# Patient Record
Sex: FEMALE | Race: BLACK OR AFRICAN AMERICAN | NOT HISPANIC OR LATINO | Employment: STUDENT | URBAN - METROPOLITAN AREA
[De-identification: names, ages, dates, MRNs, and addresses within clinical notes are randomized per-mention and may not be internally consistent; named-entity substitution may affect disease eponyms.]

---

## 2024-02-09 ENCOUNTER — OFFICE VISIT (OUTPATIENT)
Dept: PRIMARY CARE | Facility: CLINIC | Age: 24
End: 2024-02-09
Payer: MEDICAID

## 2024-02-09 ENCOUNTER — LAB (OUTPATIENT)
Dept: LAB | Facility: LAB | Age: 24
End: 2024-02-09
Payer: MEDICAID

## 2024-02-09 VITALS
SYSTOLIC BLOOD PRESSURE: 110 MMHG | BODY MASS INDEX: 22.36 KG/M2 | DIASTOLIC BLOOD PRESSURE: 70 MMHG | HEIGHT: 64 IN | WEIGHT: 131 LBS

## 2024-02-09 DIAGNOSIS — Z00.00 ANNUAL PHYSICAL EXAM: ICD-10-CM

## 2024-02-09 DIAGNOSIS — D50.0 IRON DEFICIENCY ANEMIA DUE TO CHRONIC BLOOD LOSS: ICD-10-CM

## 2024-02-09 DIAGNOSIS — Z00.00 ANNUAL PHYSICAL EXAM: Primary | ICD-10-CM

## 2024-02-09 DIAGNOSIS — K51.919 ULCERATIVE COLITIS WITH COMPLICATION, UNSPECIFIED LOCATION (MULTI): ICD-10-CM

## 2024-02-09 DIAGNOSIS — R77.0 LOW SERUM ALBUMIN: ICD-10-CM

## 2024-02-09 DIAGNOSIS — Z84.81 FAMILY HISTORY OF BRCA GENE MUTATION: ICD-10-CM

## 2024-02-09 DIAGNOSIS — D75.838 REACTIVE THROMBOCYTOSIS: ICD-10-CM

## 2024-02-09 DIAGNOSIS — Z11.1 VISIT FOR TB SKIN TEST: ICD-10-CM

## 2024-02-09 LAB
25(OH)D3 SERPL-MCNC: 32 NG/ML (ref 30–100)
ALBUMIN SERPL BCP-MCNC: 2.5 G/DL (ref 3.4–5)
ALP SERPL-CCNC: 216 U/L (ref 33–110)
ALT SERPL W P-5'-P-CCNC: 9 U/L (ref 7–45)
ANION GAP SERPL CALC-SCNC: 14 MMOL/L (ref 10–20)
AST SERPL W P-5'-P-CCNC: 12 U/L (ref 9–39)
BILIRUB SERPL-MCNC: 0.2 MG/DL (ref 0–1.2)
BUN SERPL-MCNC: 13 MG/DL (ref 6–23)
CALCIUM SERPL-MCNC: 8.8 MG/DL (ref 8.6–10.6)
CHLORIDE SERPL-SCNC: 108 MMOL/L (ref 98–107)
CHOLEST SERPL-MCNC: 81 MG/DL (ref 0–199)
CHOLESTEROL/HDL RATIO: 1.3
CO2 SERPL-SCNC: 25 MMOL/L (ref 21–32)
CREAT SERPL-MCNC: 0.53 MG/DL (ref 0.5–1.05)
EGFRCR SERPLBLD CKD-EPI 2021: >90 ML/MIN/1.73M*2
ERYTHROCYTE [DISTWIDTH] IN BLOOD BY AUTOMATED COUNT: 20.1 % (ref 11.5–14.5)
EST. AVERAGE GLUCOSE BLD GHB EST-MCNC: 94 MG/DL
FERRITIN SERPL-MCNC: 11 NG/ML (ref 8–150)
GLUCOSE SERPL-MCNC: 98 MG/DL (ref 74–99)
HBA1C MFR BLD: 4.9 %
HBV SURFACE AB SER-ACNC: <3.1 MIU/ML
HCT VFR BLD AUTO: 24.3 % (ref 36–46)
HCV AB SER QL: NONREACTIVE
HDLC SERPL-MCNC: 62 MG/DL
HGB BLD-MCNC: 6.4 G/DL (ref 12–16)
HIV 1+2 AB+HIV1 P24 AG SERPL QL IA: NONREACTIVE
LDLC SERPL CALC-MCNC: 9 MG/DL
MCH RBC QN AUTO: 17.7 PG (ref 26–34)
MCHC RBC AUTO-ENTMCNC: 26.3 G/DL (ref 32–36)
MCV RBC AUTO: 67 FL (ref 80–100)
NON HDL CHOLESTEROL: 19 MG/DL (ref 0–149)
NRBC BLD-RTO: 0 /100 WBCS (ref 0–0)
PLATELET # BLD AUTO: 692 X10*3/UL (ref 150–450)
POTASSIUM SERPL-SCNC: 3.9 MMOL/L (ref 3.5–5.3)
PROT SERPL-MCNC: 6.6 G/DL (ref 6.4–8.2)
RBC # BLD AUTO: 3.62 X10*6/UL (ref 4–5.2)
SODIUM SERPL-SCNC: 143 MMOL/L (ref 136–145)
TRIGL SERPL-MCNC: 50 MG/DL (ref 0–149)
TSH SERPL-ACNC: 2 MIU/L (ref 0.44–3.98)
VIT B12 SERPL-MCNC: 1136 PG/ML (ref 211–911)
VLDL: 10 MG/DL (ref 0–40)
WBC # BLD AUTO: 5.5 X10*3/UL (ref 4.4–11.3)

## 2024-02-09 PROCEDURE — 87389 HIV-1 AG W/HIV-1&-2 AB AG IA: CPT

## 2024-02-09 PROCEDURE — 80053 COMPREHEN METABOLIC PANEL: CPT

## 2024-02-09 PROCEDURE — 99395 PREV VISIT EST AGE 18-39: CPT | Performed by: INTERNAL MEDICINE

## 2024-02-09 PROCEDURE — 86803 HEPATITIS C AB TEST: CPT

## 2024-02-09 PROCEDURE — 82306 VITAMIN D 25 HYDROXY: CPT

## 2024-02-09 PROCEDURE — 99214 OFFICE O/P EST MOD 30 MIN: CPT | Performed by: INTERNAL MEDICINE

## 2024-02-09 PROCEDURE — 86481 TB AG RESPONSE T-CELL SUSP: CPT

## 2024-02-09 PROCEDURE — 86706 HEP B SURFACE ANTIBODY: CPT

## 2024-02-09 PROCEDURE — 82728 ASSAY OF FERRITIN: CPT

## 2024-02-09 PROCEDURE — 83036 HEMOGLOBIN GLYCOSYLATED A1C: CPT

## 2024-02-09 PROCEDURE — 36415 COLL VENOUS BLD VENIPUNCTURE: CPT

## 2024-02-09 PROCEDURE — 80061 LIPID PANEL: CPT

## 2024-02-09 PROCEDURE — 85027 COMPLETE CBC AUTOMATED: CPT

## 2024-02-09 PROCEDURE — 84443 ASSAY THYROID STIM HORMONE: CPT

## 2024-02-09 PROCEDURE — 82607 VITAMIN B-12: CPT

## 2024-02-09 RX ORDER — FERROUS SULFATE 300 MG/5ML
60 LIQUID (ML) ORAL DAILY
Qty: 150 ML | Refills: 11 | Status: SHIPPED | OUTPATIENT
Start: 2024-02-09 | End: 2025-02-08

## 2024-02-09 ASSESSMENT — PATIENT HEALTH QUESTIONNAIRE - PHQ9
2. FEELING DOWN, DEPRESSED OR HOPELESS: NOT AT ALL
1. LITTLE INTEREST OR PLEASURE IN DOING THINGS: NOT AT ALL
SUM OF ALL RESPONSES TO PHQ9 QUESTIONS 1 AND 2: 0

## 2024-02-09 ASSESSMENT — LIFESTYLE VARIABLES: HOW MANY STANDARD DRINKS CONTAINING ALCOHOL DO YOU HAVE ON A TYPICAL DAY: PATIENT DOES NOT DRINK

## 2024-02-09 NOTE — PROGRESS NOTES
Chief Complaint:   Chief Complaint   Patient presents with    CPE      HPI    Pastora Rawls is a 23 y.o. year old female who presents to the clinic for physical and several problems  Went to ER in October for leg swelling, was told she had low albumin and low hemoglobin.  She was also told that she has thrombocytosis.        Past Medical History   Past Medical History:   Diagnosis Date    Personal history of other diseases of the respiratory system     Personal history of asthma      Past Surgical History:   Past Surgical History:   Procedure Laterality Date    BACK SURGERY  09/17/2014    Back Surgery    TONSILLECTOMY  06/11/2014    Tonsillectomy With Adenoidectomy     Family History:   No family history on file.  Social History:   Tobacco Use: Low Risk  (2/9/2024)    Patient History     Smoking Tobacco Use: Never     Smokeless Tobacco Use: Never     Passive Exposure: Not on file      Social History     Substance and Sexual Activity   Alcohol Use Never        Allergies:   Not on File     ROS   Review of Systems     Objective   Vitals:    02/09/24 1420   BP: 110/70      BMI Readings from Last 15 Encounters:   02/09/24 22.49 kg/m²   02/09/23 24.65 kg/m²      59.4 kg (131 lb) (2/9/2024  2:20 PM)      Physical Exam  Physical Exam  Vitals and nursing note reviewed.   Constitutional:       Appearance: Normal appearance.   Cardiovascular:      Rate and Rhythm: Normal rate and regular rhythm.      Pulses: Normal pulses.      Heart sounds: Normal heart sounds.   Pulmonary:      Breath sounds: Normal breath sounds.   Abdominal:      General: Bowel sounds are normal.      Palpations: Abdomen is soft.   Musculoskeletal:         General: Normal range of motion.   Skin:     General: Skin is warm.   Neurological:      General: No focal deficit present.      Mental Status: She is alert.   Psychiatric:         Mood and Affect: Mood normal.         Behavior: Behavior normal.          Labs:  Lab Results   Component Value  "Date    WBC 6.4 02/09/2023    HGB 10.6 (L) 02/09/2023    HCT 35.9 (L) 02/09/2023    MCV 81 02/09/2023     (H) 02/09/2023     Lab Results   Component Value Date    GLUCOSE 92 02/09/2023    CALCIUM 9.0 02/09/2023     02/09/2023    K 4.1 02/09/2023    CO2 23 02/09/2023     (H) 02/09/2023    BUN 7 02/09/2023    CREATININE 0.62 02/09/2023         No components found for: \"URINE ALBUMIN\"  Lab Results   Component Value Date    HGBA1C 5.0 02/09/2023      Lab Results   Component Value Date    HGBA1C 5.0 02/09/2023     Lab Results   Component Value Date    TSH 2.04 02/09/2023       Current Medications:  No current outpatient medications on file.     No current facility-administered medications for this visit.       Assessment and Plan  Pastora was seen today for cpe.  Diagnoses and all orders for this visit:  Annual physical exam (Primary)  -     CBC; Future  -     Comprehensive Metabolic Panel; Future  -     TSH with reflex to Free T4 if abnormal; Future  -     Hemoglobin A1C; Future  -     Lipid Panel; Future  -     Vitamin D 25-Hydroxy,Total (for eval of Vitamin D levels); Future  -     Vitamin B12; Future  -     HIV 1/2 Antigen/Antibody Screen with Reflex to Confirmation; Future  -     T-Spot TB; Future  -     Hepatitis C antibody; Future  -     Hepatitis B surface antibody; Future  Low serum albumin  -     Comprehensive Metabolic Panel; Future  Ulcerative colitis with complication, unspecified location (CMS/HCC)  -     XR DEXA bone density; Future  Visit for TB skin test  Family history of BRCA gene mutation  -     Referral to Breast Surgery; Future  Iron deficiency anemia due to chronic blood loss  -     Ferritin; Future  Reactive thrombocytosis     Normal exam  Slight face puffiness seen    Anemia  Severe   Will likely need iron infusions  Meanwhile start iron liquid, unable to tolerate pills in the past    Thrombocytosis  Reactive  Due to severe iron deficiency    Low albumin  Due to incorrect vegan " diet  Recommend nutritionist referral to calculate protein needs and recommend supplements    BRCA mutation in the family  Recommend breast surgery referral      Immunizations:    There is no immunization history on file for this patient.  Please follow up in

## 2024-02-10 DIAGNOSIS — D50.0 IRON DEFICIENCY ANEMIA DUE TO CHRONIC BLOOD LOSS: Primary | ICD-10-CM

## 2024-02-10 RX ORDER — IRON SUCROSE 20 MG/ML
200 INJECTION, SOLUTION INTRAVENOUS
Qty: 20 ML | Refills: 0 | Status: SHIPPED
Start: 2024-02-10 | End: 2024-05-07 | Stop reason: ALTCHOICE

## 2024-02-10 RX ORDER — FAMOTIDINE 10 MG/ML
20 INJECTION INTRAVENOUS ONCE AS NEEDED
OUTPATIENT
Start: 2024-02-14

## 2024-02-10 RX ORDER — EPINEPHRINE 0.3 MG/.3ML
0.3 INJECTION SUBCUTANEOUS EVERY 5 MIN PRN
OUTPATIENT
Start: 2024-02-14

## 2024-02-10 RX ORDER — ALBUTEROL SULFATE 0.83 MG/ML
3 SOLUTION RESPIRATORY (INHALATION) AS NEEDED
OUTPATIENT
Start: 2024-02-14

## 2024-02-10 RX ORDER — DIPHENHYDRAMINE HYDROCHLORIDE 50 MG/ML
50 INJECTION INTRAMUSCULAR; INTRAVENOUS AS NEEDED
OUTPATIENT
Start: 2024-02-14

## 2024-02-11 LAB
NIL(NEG) CONTROL SPOT COUNT: NORMAL
PANEL A SPOT COUNT: 0
PANEL B SPOT COUNT: 0
POS CONTROL SPOT COUNT: NORMAL
T-SPOT. TB INTERPRETATION: NEGATIVE

## 2024-02-19 NOTE — PROGRESS NOTES
Gastroenterology Clinic New Consult Note    Reason For Consult: UC    History Of Present Illness  22F with juvenile idiopathic scoliosis and UC referred to GI clinic for management of Ulcerative colitis.     In terms of hx of UC, pt had previously followed with pediatric GI. Pastora was diagnosed with UC age 7 (presenting with GI bleeding, diarrhea). Peds GI notes from 2014 mention azathioprine 2.5 tabs and Asacol/mesalamine 2 capsules as her regimen. Asacol was increased from bid to tid in 2015 for elevated CRP. Lost asacol coverage sometime in 2015 and was transitioned to Sulfazalazine 500mg 2 tablets twice at day, Folic acid 1 tablet daily, Imuran 2 and half tablets Monday wed, Friday, 3 tablets tues, Thursday, sat and Sunday in 2016. Was also intermittently on prednisone (when she was first diagnosed, and then about < 1 time per year), was last on it in middle school. Went vegan at 15, diet managing. Her last scope was in 2016, mild pancolitis with biopsies showed nonactive colitis. Her last Imuran level (6 MP level) was 339 in August of 2016. FCP 2016 1289 --> 568. 6-TG 2016 203. She was last seen by peds GI 3/2017. Pt was then seen by Dr. Mayers to establish care with adult GI in Aug, 2022. At the time, she had been off all therapy. She was recommended for colonoscopy, blood work and FCP. Pt was then seen for follow up in Feb, 2023. She did not get any of the testing completed. Had been doing well (Bms: 2-5x/day, loose, does endorse it takes a while to evacuate, no hematochezia).  She was re-ordered for colonoscopy, blood work and stool studies with plans to start on mesalamine. Labs from Feb, 2023: Hb 10.6 (MCV 81), plts 589. Cr 0.62. Albumin 3.3. Alkphos 143.     Pt presented to ED in October for LE edema. Albumin at the time 2.3. Alkphos 242. AST ALT 15/7. Cr 0.46. Hb 7.1 (MCV 69.3). Plts 672.     Pt saw her PCP, Dr. Lockhart, on 2/9. Labs with Hb down to 6.4 (MCV 67), plts 692. Albumin 2.5. alkphos 216.  LFTs otherwise wnls. Ferritin 11. Pt was ordered for IV iron infusion, recommended to start liquid iron BID.     She is currently vegan; avoids spicy foods. Wants to see a nutritionist. Over the past 2 years her symptoms have gotten out of control, can't manage her symptoms. Wasn't happy with care with prior GI. Flares: urgency, frequent stools, some bleeding per rectum, fatigue. Recently: having 3-4 Bms per day, but can get up to 6-8 times per day, loose. Very diet dependent. Having blood in stool - off and on, within the past 2 weeks its about every other day. No melena. Does have abd pain when she is flaring, pain throughout. No tenesmus, slight pain with BM. Does have joint pain- specifically her fingers, no swelling of joints. No rashes. Has had weight loss (2021 lost about 50 lbs, but more stable recently). NO fevers.  Hasn't started liquid iron that PCP prescribed. No recent hospital admissions. No heavy period- regular every 28 days, 3-4 days, very light. Does not take NSAIDs. Lately has been feeling SOB, has tachycardia often.     She would be interested in medications at this time. Doesn't want to take a ton of pills. Hesitant to start steroids.     FCT, Hep B S Ag  Colonoscopy   Uceris   Entyvio      Past Medical History  Past Medical History:   Diagnosis Date    Personal history of other diseases of the respiratory system     Personal history of asthma       Surgical History  Past Surgical History:   Procedure Laterality Date    BACK SURGERY  09/17/2014    Back Surgery    TONSILLECTOMY  06/11/2014    Tonsillectomy With Adenoidectomy       Social History  No smoking. No EtOH use. No drug use. Works as gan/ author.     Family History  No family history on file.   No fhx of CRC, esophageal, gastric, or pancreatic cancer. Recently found biologic father, he additionally has GI issues.    BRCA gene in family (maternal grandmother), Pastora has never been tested but has appointment coming up.   Mom- breast  "cancer when she was 35  Great aunt (m)- breast cancer   Great grandmother (m)- breast cancer     Allergies  Allergies   Allergen Reactions    Aspirin Other    Gelatin Itching     Nausea/vomitting    Nsaids (Non-Steroidal Anti-Inflammatory Drug) Itching     Crohn's       Home Medications- not currently taking     Current Outpatient Medications:     ferrous sulfate 300 mg (60 mg iron)/5 mL syrup, Take 5 mL (60 mg of iron) by mouth once daily., Disp: 150 mL, Rfl: 11    PRENAT 115-IRON FUM-FOLIC-DSS ORAL, Take by mouth., Disp: , Rfl:     iron sucrose (Venofer) 200 mg iron/10 mL injection, Infuse 10 mL (200 mg) over 5 minutes into a venous catheter 1 (one) time per week. (Patient not taking: Reported on 2/20/2024), Disp: 20 mL, Rfl: 0    Physical Exam     Last Recorded Vitals  Blood pressure 103/66, pulse 101, temperature 36.4 °C (97.6 °F), resp. rate 18, height 1.626 m (5' 4\"), weight 60.8 kg (134 lb), SpO2 100 %.    General: well-nourished, no acute distress  HEENT: PERRLA, EOM intact, no scleral icterus, moist MM  Respiratory: CTA bilaterally, normal work of breathing  Cardiovascular: RRR, no murmurs/rubs/gallops  Abdomen: Soft, mild LLQ tenderness to deep palpation, otherwise no abd pain; non-distended, bowel sounds present  Extremities: no edema, no asterixis, no joint effusions, no rash  Neuro: alert and oriented, no focal deficits     Relevant Results    Current Outpatient Medications:     ferrous sulfate 300 mg (60 mg iron)/5 mL syrup, Take 5 mL (60 mg of iron) by mouth once daily., Disp: 150 mL, Rfl: 11    PRENAT 115-IRON FUM-FOLIC-DSS ORAL, Take by mouth., Disp: , Rfl:     iron sucrose (Venofer) 200 mg iron/10 mL injection, Infuse 10 mL (200 mg) over 5 minutes into a venous catheter 1 (one) time per week. (Patient not taking: Reported on 2/20/2024), Disp: 20 mL, Rfl: 0     Lab Results   Component Value Date    WBC 5.5 02/09/2024    HGB 6.4 (LL) 02/09/2024    HCT 24.3 (L) 02/09/2024    MCV 67 (L) 02/09/2024    "  (H) 02/09/2024     Lab Results   Component Value Date    GLUCOSE 98 02/09/2024    CALCIUM 8.8 02/09/2024     02/09/2024    K 3.9 02/09/2024    CO2 25 02/09/2024     (H) 02/09/2024    BUN 13 02/09/2024    CREATININE 0.53 02/09/2024     Lab Results   Component Value Date    ALT 9 02/09/2024    AST 12 02/09/2024    ALKPHOS 216 (H) 02/09/2024    BILITOT 0.2 02/09/2024       Imaging:  No Abd imaging in our system.     Procedures:  EGD 2016: - Normal esophagus, stomach, and examined duodenum. Biopsied.     Colonoscopy 2016:  - Inflammation was found in the colon, in the recto-sigmoid colon, in the transverse colon and at the cecum secondary to colitis. Biopsied.   A. ESOPHAGUS, BIOPSY: --NO SIGNIFICANT HISTOPATHOLOGIC CHANGE. --NEGATIVE FOR INTRAEPITHELIAL EOSINOPHILS.   B. STOMACH, BIOPSY: --NO SIGNIFICANT HISTOPATHOLOGIC CHANGE. --NEGATIVE FOR HELICOBACTER PYLORI-LIKE ORGANISMS BY MORPHOLOGY.   C. DUODENUM, BIOPSY: --NORMAL VILLOUS ARCHITECTURE WITH NO SIGNIFICANT HISTOPATHOLOGIC CHANGE.   D. TERMINAL ILEUM, BIOPSY: --PROMINENT LYMPHOID AGGREGATE, OTHERWISE NO SIGNIFICANT HISTOPATHOLOGIC CHANGE.   E. CECUM, BIOPSY: --MILD CHRONIC COLITIS WITHOUT ACTIVITY.   F. COLON, RIGHT, BIOPSY: --MILD CHRONIC COLITIS WITHOUT ACTIVITY.   G. COLON, LEFT, BIOPSY: --MILD CHRONIC COLITIS WITHOUT ACTIVITY.   H. RECTUM, BIOPSY: --MILD CHRONIC PROCTITIS WITHOUT ACTIVITY. Comment: There is no evidence of active colitis and only mild chronicity.     EGD 2013:   Impression:            - Normal esophagus. This was biopsied.                         - Gastritis. This was biopsied.                         - Normal examined duodenum. This was biopsied.    Colon 2013:  Impression:            - Inflammation was found from the rectum to the                          hepatic flexure secondary to ulcerative colitis. This                          was biopsied.                         - The examined portion of the ileum was normal. This                           was biopsied.  FINAL DIAGNOSIS   A. DUODENUM: -- DUODENAL MUCOSA, NO PATHOLOGIC DIAGNOSIS   B. STOMACH: -- GASTRIC FUNDIC AND ANTRAL MUCOSA, NO PATHOLOGIC DIAGNOSIS   C. ESOPHAGUS: -- ESOPHAGEAL SQUAMOUS MUCOSA, NO PATHOLOGIC DIAGNOSIS   D. TERMINAL ILEUM: -- ILEAL MUCOSA WITH FOCAL INCREASED EOSINOPHILS IN THE LAMINA PROPRIA, NO ACTIVE ILEITIS IS SEEN   E. RIGHT COLON: -- COLONIC MUCOSA WITH MILD ARCHITECTURE DISTORTION AND INCREASED EOSINOPHILS IN THE LAMINA PROPRIA, NO ACTIVE COLITIS IS SEEN   F. LEFT COLON: -- COLONIC MUCOSA WITH MODERATE DIFFUSE ACTIVE CHRONIC COLITIS   G. RECTUM: -- COLONIC MUCOSA WITH MILD DIFFUSE CHRONIC ACTIVE COLITIS   H. TRANSVERSE COLON: -- COLONIC MUCOSA WITH MODERATE DIFFUSE ACTIVE CHRONIC COLITIS, SEE NOTE     ASSESSMENT/PLAN:  22F with juvenile idiopathic scoliosis and UC referred to GI clinic for management of Ulcerative colitis.     Pt has been off therapy for UC since 2016. Had been on azathioprine and asacol in past, which patient thinks helps with symptoms but reports inconsistnet complaince.     Today she is here with moderate to severe UC with evidence of low albumin and anemia. Will plan to start her on uceris 9mg daily for rapid symptom control. Pt is hesitant to trial steroids. Plan to start entyvio for longer term disease management. Patient is overdue for colonoscopy, will order today.     In terms of microcytic anmia, this is likely 2/2 blood loss in the setting of uncontrolled UC. Will plan to treat as above. In terms of elevated alkphos, on chart review this has been elevated since 2013. Will plan to check several labs PARI, AMA, AMSA and will obtain RUQ US to further evaluation. If RUQ US is wnls she will likely need MRCP to assess for PBC.     #ulcerative colitis:  #elevated alkphos:  #PRASAD  - start uceris 9 mg daily   - will plan to start entyvio after lab testing  - check labs today (Tb negative, HBsAb negative; will send as well as PARI, AMA,  ASMA, HBsAg, iron panel)  - check fecal calprotectin   - ordered for colonoscopy today   - ordered for RUQ US  - start liquid iron as prescribed by PCP, plan to start IV iron    Follow up in 3 months in clinic or sooner PRN.

## 2024-02-20 ENCOUNTER — LAB (OUTPATIENT)
Dept: LAB | Facility: LAB | Age: 24
End: 2024-02-20
Payer: MEDICAID

## 2024-02-20 ENCOUNTER — OFFICE VISIT (OUTPATIENT)
Dept: GASTROENTEROLOGY | Facility: HOSPITAL | Age: 24
End: 2024-02-20
Payer: MEDICAID

## 2024-02-20 VITALS
HEART RATE: 101 BPM | BODY MASS INDEX: 22.88 KG/M2 | OXYGEN SATURATION: 100 % | WEIGHT: 134 LBS | DIASTOLIC BLOOD PRESSURE: 66 MMHG | TEMPERATURE: 97.6 F | RESPIRATION RATE: 18 BRPM | SYSTOLIC BLOOD PRESSURE: 103 MMHG | HEIGHT: 64 IN

## 2024-02-20 DIAGNOSIS — K51.919 ULCERATIVE COLITIS WITH COMPLICATION, UNSPECIFIED LOCATION (MULTI): ICD-10-CM

## 2024-02-20 DIAGNOSIS — R10.30 LOWER ABDOMINAL PAIN: ICD-10-CM

## 2024-02-20 DIAGNOSIS — R79.89 ABNORMAL LFTS: ICD-10-CM

## 2024-02-20 DIAGNOSIS — K51.919 ULCERATIVE COLITIS WITH COMPLICATION, UNSPECIFIED LOCATION (MULTI): Primary | ICD-10-CM

## 2024-02-20 DIAGNOSIS — K51.219 ULCERATIVE PROCTITIS WITH COMPLICATION (MULTI): Primary | ICD-10-CM

## 2024-02-20 LAB
ERYTHROCYTE [DISTWIDTH] IN BLOOD BY AUTOMATED COUNT: 24 % (ref 11.5–14.5)
HBV CORE AB SER QL: NONREACTIVE
HBV SURFACE AG SERPL QL IA: NONREACTIVE
HCT VFR BLD AUTO: 24.8 % (ref 36–46)
HGB BLD-MCNC: 6.5 G/DL (ref 12–16)
IRON SATN MFR SERPL: 5 % (ref 25–45)
IRON SERPL-MCNC: 13 UG/DL (ref 35–150)
MCH RBC QN AUTO: 18.7 PG (ref 26–34)
MCHC RBC AUTO-ENTMCNC: 26.2 G/DL (ref 32–36)
MCV RBC AUTO: 71 FL (ref 80–100)
NRBC BLD-RTO: 0 /100 WBCS (ref 0–0)
PLATELET # BLD AUTO: 627 X10*3/UL (ref 150–450)
RBC # BLD AUTO: 3.48 X10*6/UL (ref 4–5.2)
TIBC SERPL-MCNC: 278 UG/DL (ref 240–445)
UIBC SERPL-MCNC: 265 UG/DL (ref 110–370)
WBC # BLD AUTO: 5.2 X10*3/UL (ref 4.4–11.3)

## 2024-02-20 PROCEDURE — 99214 OFFICE O/P EST MOD 30 MIN: CPT | Performed by: INTERNAL MEDICINE

## 2024-02-20 PROCEDURE — 83550 IRON BINDING TEST: CPT

## 2024-02-20 PROCEDURE — 86704 HEP B CORE ANTIBODY TOTAL: CPT

## 2024-02-20 PROCEDURE — 36415 COLL VENOUS BLD VENIPUNCTURE: CPT

## 2024-02-20 PROCEDURE — 85027 COMPLETE CBC AUTOMATED: CPT

## 2024-02-20 PROCEDURE — 86381 MITOCHONDRIAL ANTIBODY EACH: CPT

## 2024-02-20 PROCEDURE — 86038 ANTINUCLEAR ANTIBODIES: CPT

## 2024-02-20 PROCEDURE — 86256 FLUORESCENT ANTIBODY TITER: CPT

## 2024-02-20 PROCEDURE — 86015 ACTIN ANTIBODY EACH: CPT

## 2024-02-20 PROCEDURE — 83540 ASSAY OF IRON: CPT

## 2024-02-20 PROCEDURE — 87340 HEPATITIS B SURFACE AG IA: CPT

## 2024-02-20 PROCEDURE — 1036F TOBACCO NON-USER: CPT | Performed by: INTERNAL MEDICINE

## 2024-02-20 RX ORDER — DIPHENHYDRAMINE HYDROCHLORIDE 50 MG/ML
50 INJECTION INTRAMUSCULAR; INTRAVENOUS AS NEEDED
Status: CANCELLED | OUTPATIENT
Start: 2024-02-27

## 2024-02-20 RX ORDER — ALBUTEROL SULFATE 0.83 MG/ML
3 SOLUTION RESPIRATORY (INHALATION) AS NEEDED
Status: CANCELLED | OUTPATIENT
Start: 2024-02-27

## 2024-02-20 RX ORDER — BUDESONIDE 9 MG/1
9 TABLET, FILM COATED, EXTENDED RELEASE ORAL DAILY
Qty: 30 TABLET | Refills: 1 | Status: SHIPPED | OUTPATIENT
Start: 2024-02-20 | End: 2024-04-11 | Stop reason: SDUPTHER

## 2024-02-20 RX ORDER — EPINEPHRINE 0.3 MG/.3ML
0.3 INJECTION SUBCUTANEOUS EVERY 5 MIN PRN
Status: CANCELLED | OUTPATIENT
Start: 2024-02-27

## 2024-02-20 RX ORDER — FAMOTIDINE 10 MG/ML
20 INJECTION INTRAVENOUS ONCE AS NEEDED
Status: CANCELLED | OUTPATIENT
Start: 2024-02-27

## 2024-02-20 SDOH — ECONOMIC STABILITY: FOOD INSECURITY: WITHIN THE PAST 12 MONTHS, YOU WORRIED THAT YOUR FOOD WOULD RUN OUT BEFORE YOU GOT MONEY TO BUY MORE.: NEVER TRUE

## 2024-02-20 SDOH — ECONOMIC STABILITY: FOOD INSECURITY: WITHIN THE PAST 12 MONTHS, THE FOOD YOU BOUGHT JUST DIDN'T LAST AND YOU DIDN'T HAVE MONEY TO GET MORE.: NEVER TRUE

## 2024-02-20 ASSESSMENT — ENCOUNTER SYMPTOMS
OCCASIONAL FEELINGS OF UNSTEADINESS: 0
DEPRESSION: 0
LOSS OF SENSATION IN FEET: 0

## 2024-02-20 ASSESSMENT — PATIENT HEALTH QUESTIONNAIRE - PHQ9
2. FEELING DOWN, DEPRESSED OR HOPELESS: NOT AT ALL
SUM OF ALL RESPONSES TO PHQ9 QUESTIONS 1 AND 2: 0
1. LITTLE INTEREST OR PLEASURE IN DOING THINGS: NOT AT ALL

## 2024-02-20 ASSESSMENT — PAIN SCALES - GENERAL: PAINLEVEL: 0-NO PAIN

## 2024-02-20 ASSESSMENT — COLUMBIA-SUICIDE SEVERITY RATING SCALE - C-SSRS
6. HAVE YOU EVER DONE ANYTHING, STARTED TO DO ANYTHING, OR PREPARED TO DO ANYTHING TO END YOUR LIFE?: NO
1. IN THE PAST MONTH, HAVE YOU WISHED YOU WERE DEAD OR WISHED YOU COULD GO TO SLEEP AND NOT WAKE UP?: NO
2. HAVE YOU ACTUALLY HAD ANY THOUGHTS OF KILLING YOURSELF?: NO

## 2024-02-20 NOTE — PATIENT INSTRUCTIONS
It was a pleasure to meet you today.  As we discussed, your ulcerative colitis is not adequately controlled and is causing you to have some evidence of chronic illness.  We recommend treatment for your ulcerative colitis and expect that lab abnormalities will improve with treatment and that you will feel better.  At this time, we recommend a short course of Uceris for rapid symptom control and then initiation of Entyvio (vedolizumab) for longer term disease management.  As reviewed today:    1) check labs  2) check stool studies  3) schedule colonoscopy  4) schedule ultrasound of the right upper quadrant  5) begin Uceris 1 capsule daily for 2 months  6) begin Entyvio (vedolizumab) with induction infusions at 0, 2, and 6 weeks and then maintenance infusions every 8 weeks thereafter  7) continue oral iron supplement and proceed with intravenous iron was arranged by her primary care physician  8) follow-up in the office in 3 months  9) call the office with worsened symptoms

## 2024-02-21 LAB
ANA SER QL HEP2 SUBST: NEGATIVE
MITOCHONDRIA AB SER QL IF: NEGATIVE

## 2024-02-23 LAB — SMOOTH MUSCLE AB SER QL IF: ABNORMAL

## 2024-02-26 ENCOUNTER — HOSPITAL ENCOUNTER (OUTPATIENT)
Dept: RADIOLOGY | Facility: CLINIC | Age: 24
Discharge: HOME | End: 2024-02-26
Payer: MEDICAID

## 2024-02-26 DIAGNOSIS — R79.89 ABNORMAL LFTS: ICD-10-CM

## 2024-02-26 PROCEDURE — 76705 ECHO EXAM OF ABDOMEN: CPT | Performed by: RADIOLOGY

## 2024-02-26 PROCEDURE — 76705 ECHO EXAM OF ABDOMEN: CPT

## 2024-02-28 ENCOUNTER — DOCUMENTATION (OUTPATIENT)
Dept: INFUSION THERAPY | Facility: CLINIC | Age: 24
End: 2024-02-28
Payer: COMMERCIAL

## 2024-02-28 NOTE — PROGRESS NOTES
"  Patient to be scheduled for New Start of Entyvio infusions.  For Diagnosis: Ulcerative Colitis     Dosing is 300mg on weeks ( 0, 2, 6 (induction)) and then Every 56 weeks (maintenance)    Labs…  Hep B SAg drawn/results:    Lab Results   Component Value Date    HEPBSAG Nonreactive 02/20/2024      No results found for: \"NONUHFIRE\", \"NONUHSWGH\", \"NONUHFISH\", \"EXTHEPBSAG\"  T-Spot drawn/results:   Lab Results   Component Value Date    TBSIN Negative 02/09/2024        Last infusion received: NA (if continuation)   Due: ANYTIME     Induction and Maintenance Therapy Plans entered if needed? Yes (if no prescribing provider contacted regarding entering maintenance plan as needed)    These results meet criteria to treat.   "

## 2024-03-04 ENCOUNTER — APPOINTMENT (OUTPATIENT)
Dept: RADIOLOGY | Facility: CLINIC | Age: 24
End: 2024-03-04
Payer: MEDICAID

## 2024-03-07 PROBLEM — R10.30 LOWER ABDOMINAL PAIN: Status: ACTIVE | Noted: 2024-02-20

## 2024-03-07 PROBLEM — D64.9 ANEMIA: Status: ACTIVE | Noted: 2024-03-07

## 2024-03-07 PROBLEM — M54.9 BACK PAIN: Status: ACTIVE | Noted: 2024-03-07

## 2024-03-07 PROBLEM — E11.36: Status: ACTIVE | Noted: 2024-03-07

## 2024-03-07 PROBLEM — R79.89 ABNORMAL LIVER FUNCTION TESTS: Status: ACTIVE | Noted: 2024-02-20

## 2024-03-07 PROBLEM — M41.9 SCOLIOSIS: Status: ACTIVE | Noted: 2024-03-07

## 2024-03-11 NOTE — PROGRESS NOTES
Pastora Rawls female   2000 23 y.o.   [id number]      Chief Complaint  High risk evaluation    History Of Present Illness  Pastora Rawls is a very pleasant 23 y.o. AA female referred [facility] by Bonnie Lockhart for high risk evaluation. She is here with her grandmother, Laurie and her godmother, Loli. She has a strong family history of breast cancer, see below. She has a family history of BRCA 2 gene mutation in a maternal great aunt and her eight children. She denies breast surgery or biopsy.    REPRODUCTIVE HISTORY: menarche age 10, nulliparous, no OCP's, premenopausal, LMP 2024, regular monthly cycles, no contraception at this time, not trying to conceive    FAMILY CANCER HISTORY:  Mother: Breast cancer, age 35, , NO genetics  Maternal Great Aunt: Breast cancer, age 50, BRCA 2 positive  Maternal Great Great Aunt: Breast cancer, age 40  Maternal Third Cousin: Breast cancer, 30s  Maternal Second Cousin (1): BRCA 2 positive  Maternal Second Cousin (2): BRCA 2 positive  Maternal Second Cousin (3): BRCA 2 positive  Maternal Second Cousin (4): BRCA 2 positive  Maternal Second Cousin (5): BRCA 2 positive  Maternal Second Cousin (6): BRCA 2 positive  Maternal Second Cousin (7): BRCA 2 positive  Maternal Second Cousin (8): BRCA 2 positive  Maternal Grandmother: Colon cancer, age 80,     Surgical History  She has a past surgical history that includes Tonsillectomy (2014) and Back surgery (2014).     Social History  She reports that she has never smoked. She has never used smokeless tobacco. She reports that she does not drink alcohol and does not use drugs.    Family History  No family history on file.     Allergies  Aspirin, Gelatin, and Nsaids (non-steroidal anti-inflammatory drug)    Medications  Current Outpatient Medications   Medication Instructions    budesonide ER (UCERIS) 9 mg, oral, Daily    ferrous sulfate 300 mg (60 mg iron)/5 mL syrup 60 mg  of iron, oral, Daily    PRENAT 115-IRON FUM-FOLIC-DSS ORAL oral    PRENATAL -IRON-FOLIC ACID ORAL oral    Venofer 200 mg, intravenous, Weekly         REVIEW OF SYSTEMS    Constitutional:  Negative for appetite change, fatigue, fever and unexpected weight change.   HENT:  Negative for ear pain, hearing loss, nosebleeds, sore throat and trouble swallowing.    Eyes:  Negative for discharge, itching and visual disturbance.   Respiratory:  Negative for cough, chest tightness and shortness of breath.    Cardiovascular:  Negative for chest pain, palpitations and leg swelling.   Breast: as indicated in HPI  Gastrointestinal:  Negative for abdominal pain, constipation, diarrhea and nausea.   Endocrine: Negative for cold intolerance and heat intolerance.   Genitourinary:  Negative for dysuria, frequency, hematuria, pelvic pain and vaginal bleeding.   Musculoskeletal:  Negative for arthralgias, back pain, gait problem, joint swelling and myalgias.   Skin:  Negative for color change and rash.   Allergic/Immunologic: Negative for environmental allergies and food allergies.   Neurological:  Negative for dizziness, tremors, speech difficulty, weakness, numbness and headaches.   Hematological:  Does not bruise/bleed easily.   Psychiatric/Behavioral:  Negative for agitation, dysphoric mood and sleep disturbance. The patient is not nervous/anxious.         Past Medical History  She has a past medical history of Personal history of other diseases of the respiratory system.     Physical Exam  Patient is alert and oriented x3 and in a relaxed and appropriate mood. Her gait is steady and hand grasps are equal. Sclera is clear. The breasts are nearly symmetrical, large and pendulous. The tissue is dense and grainy without palpable abnormalities, discrete nodules or masses. The skin and nipples appear normal. There is no cervical, supraclavicular or axillary lymphadenopathy. Heart rate and rhythm normal, S1 and S2 appreciated. The  lungs are clear to auscultation bilaterally. Abdomen is soft and non-tender.       Physical Exam     Last Recorded Vitals  Vitals:    03/18/24 1456   BP: 114/77   Pulse: (!) 131   Temp: 37.7 °C (99.9 °F)   SpO2: 100%        Assessment/Plan   High risk surveillance care, normal clinical exam, family history of BRCA 2 gene mutation, family history of breast cancer    Plan: Schedule appt with Genetics. Return to [facility] for further discussion of high risk following results.     Patient Discussion/Summary  Your clinical examination is normal. Per our conversation, please schedule an appointment with Genetics. Please return to the [facility] for high risk discussion following results from Genetic testing.     You can see your health information, review clinical summaries from office visits & test results online when you follow your health with MY  Chart, a personal health record. To sign up go to [email or web address]. If you need assistance with signing up or trouble getting into your account call [facility] Patient Line 24/7 at [phone].    My office phone number is [phone] if you need to get in touch with me or have additional questions or concerns. Thank you for choosing [facility] and trusting me as your healthcare provider. I look forward to seeing you again at your next office visit. I am honored to be a provider on your health care team and I remain dedicated to helping you achieve your health goals.      Jia Art, APRN-CNP

## 2024-03-12 ENCOUNTER — APPOINTMENT (OUTPATIENT)
Dept: OBSTETRICS AND GYNECOLOGY | Facility: HOSPITAL | Age: 24
End: 2024-03-12
Payer: MEDICAID

## 2024-03-18 ENCOUNTER — OFFICE VISIT (OUTPATIENT)
Dept: SURGICAL ONCOLOGY | Facility: CLINIC | Age: 24
End: 2024-03-18
Payer: MEDICAID

## 2024-03-18 VITALS
HEART RATE: 131 BPM | DIASTOLIC BLOOD PRESSURE: 77 MMHG | WEIGHT: 137.02 LBS | SYSTOLIC BLOOD PRESSURE: 114 MMHG | BODY MASS INDEX: 23.52 KG/M2 | OXYGEN SATURATION: 100 % | TEMPERATURE: 99.9 F

## 2024-03-18 DIAGNOSIS — Z84.81 FAMILY HISTORY OF BRCA GENE MUTATION: ICD-10-CM

## 2024-03-18 DIAGNOSIS — Z12.39 BREAST CANCER SCREENING, HIGH RISK PATIENT: Primary | ICD-10-CM

## 2024-03-18 PROCEDURE — 1036F TOBACCO NON-USER: CPT | Performed by: NURSE PRACTITIONER

## 2024-03-18 PROCEDURE — 99214 OFFICE O/P EST MOD 30 MIN: CPT | Performed by: NURSE PRACTITIONER

## 2024-03-18 PROCEDURE — 99204 OFFICE O/P NEW MOD 45 MIN: CPT | Performed by: NURSE PRACTITIONER

## 2024-03-18 ASSESSMENT — PAIN SCALES - GENERAL: PAINLEVEL: 0-NO PAIN

## 2024-03-18 NOTE — PATIENT INSTRUCTIONS
Your clinical examination is normal. Per our conversation, please schedule an appointment with Genetics. Please return to the Breast Center for high risk discussion following results from Genetic testing.     You can see your health information, review clinical summaries from office visits & test results online when you follow your health with MY  Chart, a personal health record. To sign up go to [email or web address]. If you need assistance with signing up or trouble getting into your account call [employer] Patient Line 24/7 at [phone].    My office phone number is [phone] if you need to get in touch with me or have additional questions or concerns. Thank you for choosing [facility] and trusting me as your healthcare provider. I look forward to seeing you again at your next office visit. I am honored to be a provider on your health care team and I remain dedicated to helping you achieve your health goals.

## 2024-04-01 ENCOUNTER — HOSPITAL ENCOUNTER (OUTPATIENT)
Dept: RADIOLOGY | Facility: CLINIC | Age: 24
End: 2024-04-01
Payer: MEDICAID

## 2024-04-08 ENCOUNTER — APPOINTMENT (OUTPATIENT)
Dept: NUTRITION | Facility: CLINIC | Age: 24
End: 2024-04-08
Payer: MEDICAID

## 2024-04-11 DIAGNOSIS — K51.919 ULCERATIVE COLITIS WITH COMPLICATION, UNSPECIFIED LOCATION (MULTI): ICD-10-CM

## 2024-04-11 RX ORDER — BUDESONIDE 9 MG/1
9 TABLET, FILM COATED, EXTENDED RELEASE ORAL DAILY
Qty: 30 TABLET | Refills: 1 | Status: SHIPPED | OUTPATIENT
Start: 2024-04-11 | End: 2024-05-07 | Stop reason: ALTCHOICE

## 2024-04-17 ENCOUNTER — HOSPITAL ENCOUNTER (OUTPATIENT)
Dept: GASTROENTEROLOGY | Facility: HOSPITAL | Age: 24
Setting detail: OUTPATIENT SURGERY
Discharge: HOME | End: 2024-04-17
Payer: COMMERCIAL

## 2024-04-17 VITALS
WEIGHT: 134 LBS | RESPIRATION RATE: 15 BRPM | SYSTOLIC BLOOD PRESSURE: 110 MMHG | OXYGEN SATURATION: 100 % | HEART RATE: 86 BPM | HEIGHT: 64 IN | TEMPERATURE: 97.9 F | DIASTOLIC BLOOD PRESSURE: 72 MMHG | BODY MASS INDEX: 22.88 KG/M2

## 2024-04-17 DIAGNOSIS — K51.919 ULCERATIVE COLITIS WITH COMPLICATION, UNSPECIFIED LOCATION (MULTI): Primary | ICD-10-CM

## 2024-04-17 PROCEDURE — 45385 COLONOSCOPY W/LESION REMOVAL: CPT | Performed by: INTERNAL MEDICINE

## 2024-04-17 PROCEDURE — 88305 TISSUE EXAM BY PATHOLOGIST: CPT | Mod: TC,SUR | Performed by: INTERNAL MEDICINE

## 2024-04-17 PROCEDURE — 45380 COLONOSCOPY AND BIOPSY: CPT | Performed by: INTERNAL MEDICINE

## 2024-04-17 PROCEDURE — 3700000012 HC SEDATION LEVEL 5+ TIME - INITIAL 15 MINUTES 5/> YEARS

## 2024-04-17 PROCEDURE — 2500000004 HC RX 250 GENERAL PHARMACY W/ HCPCS (ALT 636 FOR OP/ED): Mod: SE | Performed by: INTERNAL MEDICINE

## 2024-04-17 PROCEDURE — 88305 TISSUE EXAM BY PATHOLOGIST: CPT | Performed by: PATHOLOGY

## 2024-04-17 PROCEDURE — 99152 MOD SED SAME PHYS/QHP 5/>YRS: CPT | Performed by: INTERNAL MEDICINE

## 2024-04-17 PROCEDURE — 7100000010 HC PHASE TWO TIME - EACH INCREMENTAL 1 MINUTE

## 2024-04-17 PROCEDURE — 7100000009 HC PHASE TWO TIME - INITIAL BASE CHARGE

## 2024-04-17 RX ORDER — ONDANSETRON HYDROCHLORIDE 2 MG/ML
4 INJECTION, SOLUTION INTRAVENOUS ONCE AS NEEDED
Status: DISCONTINUED | OUTPATIENT
Start: 2024-04-17 | End: 2024-04-18 | Stop reason: HOSPADM

## 2024-04-17 RX ORDER — SODIUM CHLORIDE, SODIUM LACTATE, POTASSIUM CHLORIDE, CALCIUM CHLORIDE 600; 310; 30; 20 MG/100ML; MG/100ML; MG/100ML; MG/100ML
20 INJECTION, SOLUTION INTRAVENOUS CONTINUOUS
Status: DISCONTINUED | OUTPATIENT
Start: 2024-04-17 | End: 2024-04-18 | Stop reason: HOSPADM

## 2024-04-17 RX ORDER — MEPERIDINE HYDROCHLORIDE 50 MG/ML
INJECTION INTRAMUSCULAR; INTRAVENOUS; SUBCUTANEOUS AS NEEDED
Status: COMPLETED | OUTPATIENT
Start: 2024-04-17 | End: 2024-04-17

## 2024-04-17 RX ORDER — MIDAZOLAM HYDROCHLORIDE 1 MG/ML
INJECTION, SOLUTION INTRAMUSCULAR; INTRAVENOUS AS NEEDED
Status: COMPLETED | OUTPATIENT
Start: 2024-04-17 | End: 2024-04-17

## 2024-04-17 RX ADMIN — MEPERIDINE HYDROCHLORIDE 25 MG: 50 INJECTION INTRAMUSCULAR; INTRAVENOUS; SUBCUTANEOUS at 15:05

## 2024-04-17 RX ADMIN — MIDAZOLAM 2 MG: 1 INJECTION INTRAMUSCULAR; INTRAVENOUS at 15:03

## 2024-04-17 RX ADMIN — MEPERIDINE HYDROCHLORIDE 50 MG: 50 INJECTION INTRAMUSCULAR; INTRAVENOUS; SUBCUTANEOUS at 15:03

## 2024-04-17 RX ADMIN — MIDAZOLAM 2 MG: 1 INJECTION INTRAMUSCULAR; INTRAVENOUS at 15:05

## 2024-04-17 ASSESSMENT — PAIN - FUNCTIONAL ASSESSMENT
PAIN_FUNCTIONAL_ASSESSMENT: 0-10

## 2024-04-17 ASSESSMENT — COLUMBIA-SUICIDE SEVERITY RATING SCALE - C-SSRS
2. HAVE YOU ACTUALLY HAD ANY THOUGHTS OF KILLING YOURSELF?: NO
6. HAVE YOU EVER DONE ANYTHING, STARTED TO DO ANYTHING, OR PREPARED TO DO ANYTHING TO END YOUR LIFE?: NO
1. IN THE PAST MONTH, HAVE YOU WISHED YOU WERE DEAD OR WISHED YOU COULD GO TO SLEEP AND NOT WAKE UP?: NO

## 2024-04-17 ASSESSMENT — PAIN SCALES - GENERAL
PAINLEVEL_OUTOF10: 0 - NO PAIN

## 2024-04-17 NOTE — H&P
"History Of Present Illness  Pastora Rawls is a 23 y.o. female presenting with IBD colitis, thought to be UC diagnosed at age 7.  Recently increased symptoms with worsened anemia and low albumin..     Past Medical History  Past Medical History:   Diagnosis Date    History of lumbar fusion     s/p h/o scoliosis    Personal history of other diseases of the respiratory system     Personal history of asthma    Ulcerative colitis (Multi)      Surgical History  Past Surgical History:   Procedure Laterality Date    BACK SURGERY  09/17/2014    Back Surgery    TONSILLECTOMY  06/11/2014    Tonsillectomy With Adenoidectomy     Social History  She reports that she has never smoked. She has never used smokeless tobacco. She reports that she does not drink alcohol and does not use drugs.    Family History  No family history on file.     Allergies  Allergies   Allergen Reactions    Aspirin Other    Gelatin Itching     Nausea/vomitting    Nsaids (Non-Steroidal Anti-Inflammatory Drug) Itching     Crohn's     Review of Systems  Pre-sedation Evaluation:  ASA Classification - ASA 2 - Patient with mild systemic disease with no functional limitations  Mallampati Score - II (hard and soft palate, upper portion of tonsils anduvula visible)    Physical Exam   Vital signs reviewed  Patient alert and oriented in no acute distress  Anicteric  Cardiac exam regular rate and rhythm S1-S2 without murmurs gallops or rubs  Lungs clear to auscultation bilaterally  Abdomen soft and nontender    Last Recorded Vitals  Blood pressure 117/81, pulse 101, temperature 36.6 °C (97.9 °F), temperature source Temporal, resp. rate 17, height 1.626 m (5' 4\"), weight 60.8 kg (134 lb), SpO2 98%.     Assessment/Plan   UC for disease evaluation     PTA/Current Medications:  (Not in a hospital admission)    Current Outpatient Medications   Medication Sig Dispense Refill    budesonide ER (Uceris) 9 mg tablet Take 1 tablet (9 mg) by mouth once daily. 30 tablet 1 "    ferrous sulfate 300 mg (60 mg iron)/5 mL syrup Take 5 mL (60 mg of iron) by mouth once daily. 150 mL 11    PRENAT 115-IRON FUM-FOLIC-DSS ORAL Take by mouth.      PRENATAL -IRON-FOLIC ACID ORAL Take by mouth.      iron sucrose (Venofer) 200 mg iron/10 mL injection Infuse 10 mL (200 mg) over 5 minutes into a venous catheter 1 (one) time per week. 20 mL 0     Current Facility-Administered Medications   Medication Dose Route Frequency Provider Last Rate Last Admin    lactated Ringer's infusion  20 mL/hr intravenous Continuous MD Lazaro Armstrong MD

## 2024-04-18 DIAGNOSIS — K51.919 ULCERATIVE COLITIS WITH COMPLICATION, UNSPECIFIED LOCATION (MULTI): Primary | ICD-10-CM

## 2024-04-18 RX ORDER — METHYLPREDNISOLONE 4 MG/1
TABLET ORAL
Qty: 21 TABLET | Refills: 0 | Status: SHIPPED | OUTPATIENT
Start: 2024-04-18 | End: 2024-05-07 | Stop reason: ALTCHOICE

## 2024-04-22 ENCOUNTER — HOSPITAL ENCOUNTER (OUTPATIENT)
Dept: RADIOLOGY | Facility: CLINIC | Age: 24
End: 2024-04-22
Payer: COMMERCIAL

## 2024-04-23 DIAGNOSIS — K51.219 ULCERATIVE PROCTITIS WITH COMPLICATION (MULTI): Primary | ICD-10-CM

## 2024-04-24 LAB
LABORATORY COMMENT REPORT: NORMAL
PATH REPORT.FINAL DX SPEC: NORMAL
PATH REPORT.GROSS SPEC: NORMAL
PATH REPORT.TOTAL CANCER: NORMAL

## 2024-04-24 RX ORDER — HEPARIN 100 UNIT/ML
500 SYRINGE INTRAVENOUS AS NEEDED
OUTPATIENT
Start: 2024-04-24

## 2024-04-24 RX ORDER — HEPARIN SODIUM,PORCINE/PF 10 UNIT/ML
50 SYRINGE (ML) INTRAVENOUS AS NEEDED
OUTPATIENT
Start: 2024-04-24

## 2024-04-25 ENCOUNTER — INFUSION (OUTPATIENT)
Dept: INFUSION THERAPY | Facility: CLINIC | Age: 24
End: 2024-04-25
Payer: COMMERCIAL

## 2024-04-25 VITALS
SYSTOLIC BLOOD PRESSURE: 106 MMHG | BODY MASS INDEX: 22.4 KG/M2 | RESPIRATION RATE: 16 BRPM | OXYGEN SATURATION: 98 % | DIASTOLIC BLOOD PRESSURE: 71 MMHG | TEMPERATURE: 98 F | HEART RATE: 102 BPM | WEIGHT: 130.51 LBS

## 2024-04-25 DIAGNOSIS — K51.219 ULCERATIVE PROCTITIS WITH COMPLICATION (MULTI): ICD-10-CM

## 2024-04-25 PROCEDURE — 96365 THER/PROPH/DIAG IV INF INIT: CPT | Performed by: NURSE PRACTITIONER

## 2024-04-25 RX ORDER — EPINEPHRINE 0.3 MG/.3ML
0.3 INJECTION SUBCUTANEOUS EVERY 5 MIN PRN
Status: CANCELLED | OUTPATIENT
Start: 2024-05-09

## 2024-04-25 RX ORDER — FAMOTIDINE 10 MG/ML
20 INJECTION INTRAVENOUS ONCE AS NEEDED
Status: CANCELLED | OUTPATIENT
Start: 2024-05-09

## 2024-04-25 RX ORDER — DIPHENHYDRAMINE HYDROCHLORIDE 50 MG/ML
50 INJECTION INTRAMUSCULAR; INTRAVENOUS AS NEEDED
Status: CANCELLED | OUTPATIENT
Start: 2024-05-09

## 2024-04-25 RX ORDER — ALBUTEROL SULFATE 0.83 MG/ML
3 SOLUTION RESPIRATORY (INHALATION) AS NEEDED
Status: CANCELLED | OUTPATIENT
Start: 2024-05-09

## 2024-04-25 ASSESSMENT — ENCOUNTER SYMPTOMS
EYE PROBLEMS: 0
FEVER: 0
TROUBLE SWALLOWING: 0
VOMITING: 0
HEMATURIA: 0
HEADACHES: 0
DIZZINESS: 0
NUMBNESS: 0
CHILLS: 0
SHORTNESS OF BREATH: 0
ABDOMINAL PAIN: 0
WOUND: 0
FATIGUE: 0
UNEXPECTED WEIGHT CHANGE: 0
ARTHRALGIAS: 0
VOICE CHANGE: 0
APPETITE CHANGE: 1
LEG SWELLING: 0
NAUSEA: 0
SORE THROAT: 0
MYALGIAS: 0
CONSTIPATION: 0
PALPITATIONS: 0
DIARRHEA: 1
EXTREMITY WEAKNESS: 0
LIGHT-HEADEDNESS: 0
FREQUENCY: 0
COUGH: 0
NERVOUS/ANXIOUS: 0
BLOOD IN STOOL: 0
WHEEZING: 0
DYSURIA: 0
DEPRESSION: 0

## 2024-04-25 NOTE — PROGRESS NOTES
[facility]   Infusion Clinic Note   Date: 2024   Name: Pastora Rawls  : 2000   MRN: [id number]         Reason for Visit: New Patient Visit and OP Infusion (PT HERE FOR FIRST DOSE, WEEK 0 OF ENTYVIO 300 MG INFUSION.  NEXT DOSE IN TWO WEEKS)         Visit Type: INFUSION       Ordered By: DR. MCKEON      Accompanied by:Relative      Diagnosis: Ulcerative proctitis with complication (Multi)       Allergies:   Allergies as of 2024 - Reviewed 2024   Allergen Reaction Noted    Aspirin Other 2024    Gelatin Itching 10/05/2023    Nsaids (non-steroidal anti-inflammatory drug) Itching 10/05/2023         Current Medications has a current medication list which includes the following prescription(s): ferrous sulfate, venofer, prenat 115/iron fum/folic/dss, prenatal no115/iron/folic acid, budesonide er, methylprednisolone, and vedolizumab.       Vitals:   Vitals:    24 1305 24 1355 24 1357 24 1427   BP: 100/66 108/66 108/66 106/71   Pulse: 105 102 102 102   Resp: 16 16 16 16   Temp: 36.8 °C (98.2 °F) 36.8 °C (98.3 °F) 36.9 °C (98.5 °F) 36.7 °C (98 °F)   TempSrc: Temporal  Temporal    SpO2: 99% 98% 99% 98%   Weight: 59.2 kg (130 lb 8.2 oz)                Infusion Pre-procedure Checklist:   - Allergies reviewed: yes   - Medications reviewed: yes       - Previous reaction to current treatment: n/a      Assess patient for the concerns below. Document provider notification as appropriate.  - Active or recent infection with/without current antibiotic use: no  - Recent or planned invasive dental work: no  - Recent or planned surgeries: no  - Recently received or plans to receive vaccinations: no  - Has treatment related toxicities: n/a  - Is pregnant:  no      Pain: 0   - Is the pain different from normal: n/a   - Is the pain tolerable: n/a   - Is your Doctor aware:  n/a      Labs: N/A         Fall Risk Screening: Chrissy Fall Risk  History of  Falling, Immediate or Within 3 Months: No  Secondary Diagnosis: No  Ambulatory Aid: Walks without aid/bedrest/nurse assist  Intravenous Therapy/Heparin Lock: Yes  Gait/Transferring: Normal/bedrest/immobile  Mental Status: Oriented to own ability  Lowe Fall Risk Score: 20       Review Of Systems:  Review of Systems   Constitutional:  Positive for appetite change. Negative for chills, fatigue, fever and unexpected weight change.        WITH STEROIDS   HENT:   Negative for hearing loss, mouth sores, sore throat, tinnitus, trouble swallowing and voice change.    Eyes:  Negative for eye problems.   Respiratory:  Negative for cough, shortness of breath and wheezing.    Cardiovascular:  Negative for chest pain, leg swelling and palpitations.   Gastrointestinal:  Positive for diarrhea. Negative for abdominal pain, blood in stool, constipation, nausea and vomiting.        PT WITH A DX OF IBD REPORTS #  4 SOFT  BM'S PER DAY. denies NOTING BLOOD BUT SOME MUCUS TO STOOLS.  denies C/O OF ABDOMINAL PAIN BUT HAS BOUTS OF NOCTURNAL STOOLING.      Genitourinary:  Positive for vaginal bleeding. Negative for dysuria, frequency and hematuria.         HAS MENSES   Musculoskeletal:  Negative for arthralgias and myalgias.   Skin:  Negative for itching, rash and wound.   Neurological:  Negative for dizziness, extremity weakness, headaches, light-headedness and numbness.   Psychiatric/Behavioral:  Negative for depression. The patient is not nervous/anxious.          Infusion Readiness:   - Assessment Concerns Related to Infusion: No  - Provider notified: n/a      Document Below Only If Indicated:   New Patient Education:    NEW PATIENT MEDICATION EDUCATION PT PROVIDED WITH WRITTEN ([employer] PT EDUCATION SHEET) AND VERBAL EDUCATION REGARDING MEDICATION GIVEN. VERIFIED MEDICATION NAME WITH PATIENT AND DISCUSSED REASON FOR USE. BRIEFLY DISCUSSED HOW MEDICATION WORKS AND EDUCATED ON GOAL OF TREATMENT, FREQUENCY OF TREATMENT, ADVERSE RXN'S AND  "COMMON SIDE EFFECTS TO MONITOR FOR. INSTRUCTED PT TO ASSURE THAT ALL PROVIDERS INCLUDING DENTISTS ARE AWARE OF MEDICATION RECEIVED. DISCUSSED FLOW OF VISIT AND ORIENTED TO INFUSION CENTER. PT VERBALIZES UNDERSTANDING. CALL LIGHT PROVIDED AND PT AWARE TO ALERT STAFF OF ANY CONCERNS DURING TREATMENT.        Treatment Conditions & Drug Specific Questions:    Vedolizumab  (ENTYVIO)    (Unless otherwise specified on patient specific therapy plan):     TREATMENT CONDITIONS:  Unless otherwise specified on patient specific therapy plan HOLD and notify provider prior to proceeding with treatment if:   o Positive Hepatitis B Surface Ag  o Positive T-Spot  o Patient has signs or symptoms of Progressive Multifocal Leukoencephalopath (PML)     Lab Results   Component Value Date    HEPBSAG Nonreactive 02/20/2024      No results found for: \"NONUHFIRE\", \"NONUHSWGH\", \"NONUHFISH\", \"EXTHEPBSAG\"  Lab Results   Component Value Date    TBSIN Negative 02/09/2024        Labs reviewed and patient meets treatment conditions? Yes    DRUG SPECIFIC QUESTIONS:  Any signs or symptoms of Progressive Multifocal Leukoencephalopath (PML) which may include: memory loss, trouble thinking, loss of balance, difficulty talking or walking, loss of vision?  No      REMINDERS:  Recommended Vitals/Observation:  Vitals: Monitor vitals at start of infusion, at 15 minutes and at completion of infusion.  Observation: First 3 infusions patient may leave 15 minutes post infusion. Subsequent maintenance infusions: if no reaction, may leave immediately post infusion.        Weight Based Drug Calculations:    WEIGHT BASED DRUGS: NOT APPLICABLE / FLAT DOSE          Initiated By: Felecia Dixon RN   Time: 4:47 PM     We administered vedolizumab (Entyvio) 300 mg in sodium chloride 0.9% 255 mL IV.      "

## 2024-04-25 NOTE — PATIENT INSTRUCTIONS
Today :We administered vedolizumab (Entyvio) 300 mg in sodium chloride 0.9% 255 mL IV.     For:   1. Ulcerative proctitis with complication (Multi)         Your next appointment is due in:  2 weeks for week 2        Please read the  Medication Guide that was given to you and reviewed during todays visit.     (Tell all doctors including dentists that you are taking this medication)     Go to the emergency room or call 911 if:  -You have signs of allergic reaction:   -Rash, hives, itching.   -Swollen, blistered, peeling skin.   -Swelling of face, lips, mouth, tongue or throat.   -Tightness of chest, trouble breathing, swallowing or talking     Call your doctor:  - If IV / injection site gets red, warm, swollen, itchy or leaks fluid or pus.     (Leave dressing on your IV site for at least 2 hours and keep area clean and dry  - If you get sick or have symptoms of infection or are not feeling well for any reason.    (Wash your hands often, stay away from people who are sick)  - If you have side effects from your medication that do not go away or are bothersome.     (Refer to the teaching your nurse gave you for side effects to call your doctor about)    - Common side effects may include:  stuffy nose, headache, feeling tired, muscle aches, upset stomach  - Before receiving any vaccines     - [facility] at   If:  - You get sick, are on antibiotics, have had a recent vaccine, have surgery or dental work and your doctor wants your visit rescheduled.  - You need to cancel and reschedule your visit for any reason. Call at least 2 days before your visit if you need to cancel.   - Your insurance changes before your next visit.    (We will need to get approval from your new insurance. This can take up to two weeks.)     [facility] is opened Monday thru Friday. We are closed on weekends and holidays.   Voice mail will take your call if the center is closed. If you leave a message  please allow 24 hours for a call back during weekdays. If you leave a message on a weekend/holiday, we will call you back the next business day.

## 2024-04-29 ENCOUNTER — OFFICE VISIT (OUTPATIENT)
Dept: OBSTETRICS AND GYNECOLOGY | Facility: HOSPITAL | Age: 24
End: 2024-04-29
Payer: COMMERCIAL

## 2024-04-29 VITALS
DIASTOLIC BLOOD PRESSURE: 73 MMHG | HEART RATE: 121 BPM | BODY MASS INDEX: 21.51 KG/M2 | WEIGHT: 126 LBS | HEIGHT: 64 IN | SYSTOLIC BLOOD PRESSURE: 110 MMHG

## 2024-04-29 DIAGNOSIS — R10.2 PELVIC PAIN IN FEMALE: ICD-10-CM

## 2024-04-29 DIAGNOSIS — Z01.419 WELL WOMAN EXAM: Primary | ICD-10-CM

## 2024-04-29 DIAGNOSIS — Z12.4 SCREENING FOR CERVICAL CANCER: ICD-10-CM

## 2024-04-29 PROCEDURE — 1036F TOBACCO NON-USER: CPT

## 2024-04-29 PROCEDURE — 88175 CYTOPATH C/V AUTO FLUID REDO: CPT | Mod: TC,GCY

## 2024-04-29 PROCEDURE — 99385 PREV VISIT NEW AGE 18-39: CPT

## 2024-04-29 SDOH — ECONOMIC STABILITY: FOOD INSECURITY: WITHIN THE PAST 12 MONTHS, YOU WORRIED THAT YOUR FOOD WOULD RUN OUT BEFORE YOU GOT MONEY TO BUY MORE.: NEVER TRUE

## 2024-04-29 SDOH — ECONOMIC STABILITY: TRANSPORTATION INSECURITY
IN THE PAST 12 MONTHS, HAS LACK OF TRANSPORTATION KEPT YOU FROM MEETINGS, WORK, OR FROM GETTING THINGS NEEDED FOR DAILY LIVING?: NO

## 2024-04-29 SDOH — ECONOMIC STABILITY: FOOD INSECURITY: WITHIN THE PAST 12 MONTHS, THE FOOD YOU BOUGHT JUST DIDN'T LAST AND YOU DIDN'T HAVE MONEY TO GET MORE.: NEVER TRUE

## 2024-04-29 SDOH — ECONOMIC STABILITY: TRANSPORTATION INSECURITY
IN THE PAST 12 MONTHS, HAS THE LACK OF TRANSPORTATION KEPT YOU FROM MEDICAL APPOINTMENTS OR FROM GETTING MEDICATIONS?: NO

## 2024-04-29 ASSESSMENT — PAIN SCALES - GENERAL: PAINLEVEL: 0-NO PAIN

## 2024-04-29 ASSESSMENT — PATIENT HEALTH QUESTIONNAIRE - PHQ9
1. LITTLE INTEREST OR PLEASURE IN DOING THINGS: NOT AT ALL
2. FEELING DOWN, DEPRESSED OR HOPELESS: NOT AT ALL
SUM OF ALL RESPONSES TO PHQ9 QUESTIONS 1 & 2: 0

## 2024-04-29 NOTE — PROGRESS NOTES
"Assessment/Plan   Problem List Items Addressed This Visit             ICD-10-CM    Pelvic pain in female R10.2    Relevant Orders    US PELVIS TRANSABDOMINAL WITH TRANSVAGINAL     Other Visit Diagnoses         Codes    Well woman exam    -  Primary Z01.419    Screening for cervical cancer     Z12.4    Relevant Orders    THINPREP PAP TEST            No follow-ups on file.    Ana Alfonso, IDALIA-TIA     Sinai Rawls is a 23 y.o. female who is here for a routine exam.     Concerns today:  See above; no other concerns    Patient's last menstrual period was 2024.   Periods are  now regular after getting treatment for other systemic conditions; used to be irregular , lasting 7 days.   Dysmenorrhea:none.   Cyclic symptoms include none.     Sexual Activity: sexually active, male partners; Patient reports 1 partners in the last 12 months.  Pain with intercourse? No   Loss of desire? No       History of prior STI: none    Current contraception: none    Last pap: ; negative per patient  History of abnormal Pap smear: no  Family history of uterine or ovarian cancer: no    Last mammogram: never  History of abnormal mammogram: no  Family history of breast cancer: yes - maternal grandmother, maternal aunt, mother; all   Menstrual History:  OB History          1    Para   0    Term                AB        Living             SAB        IAB        Ectopic        Multiple        Live Births                    Patient's last menstrual period was 2024.       Objective   /73 (BP Location: Left arm, Patient Position: Sitting, BP Cuff Size: Adult)   Pulse (!) 121   Ht 1.626 m (5' 4\")   Wt 57.2 kg (126 lb)   LMP 2024   BMI 21.63 kg/m²   Physical Exam  Constitutional:       Appearance: Normal appearance.   Genitourinary:      Vulva, bladder, rectum and urethral meatus normal.      No vaginal prolapse present.     No vaginal atrophy present.     Pelvic " exam was performed with patient in the lithotomy position and normal support.   Breasts:     Breasts are soft.     Right: Normal.      Left: Normal.   HENT:      Head: Normocephalic and atraumatic.      Mouth/Throat:      Mouth: Mucous membranes are moist.   Cardiovascular:      Rate and Rhythm: Normal rate and regular rhythm.      Heart sounds: Normal heart sounds.   Pulmonary:      Effort: Pulmonary effort is normal.      Breath sounds: Normal breath sounds.   Abdominal:      General: Abdomen is flat.      Palpations: Abdomen is soft.   Musculoskeletal:         General: Normal range of motion.      Cervical back: Normal range of motion and neck supple.   Neurological:      Mental Status: She is alert and oriented to person, place, and time.   Skin:     General: Skin is warm and dry.   Psychiatric:         Mood and Affect: Mood normal.         Behavior: Behavior normal.         Thought Content: Thought content normal.         Judgment: Judgment normal.

## 2024-05-02 DIAGNOSIS — D50.0 IRON DEFICIENCY ANEMIA DUE TO CHRONIC BLOOD LOSS: ICD-10-CM

## 2024-05-03 DIAGNOSIS — K51.219 ULCERATIVE PROCTITIS WITH COMPLICATION (MULTI): Primary | ICD-10-CM

## 2024-05-03 RX ORDER — DIPHENHYDRAMINE HYDROCHLORIDE 50 MG/ML
50 INJECTION INTRAMUSCULAR; INTRAVENOUS AS NEEDED
OUTPATIENT
Start: 2024-05-03

## 2024-05-03 RX ORDER — FAMOTIDINE 10 MG/ML
20 INJECTION INTRAVENOUS ONCE AS NEEDED
OUTPATIENT
Start: 2024-05-03

## 2024-05-03 RX ORDER — EPINEPHRINE 0.3 MG/.3ML
0.3 INJECTION SUBCUTANEOUS EVERY 5 MIN PRN
OUTPATIENT
Start: 2024-05-03

## 2024-05-03 RX ORDER — ALBUTEROL SULFATE 0.83 MG/ML
3 SOLUTION RESPIRATORY (INHALATION) AS NEEDED
OUTPATIENT
Start: 2024-05-03

## 2024-05-07 ENCOUNTER — OFFICE VISIT (OUTPATIENT)
Dept: GASTROENTEROLOGY | Facility: HOSPITAL | Age: 24
End: 2024-05-07
Payer: COMMERCIAL

## 2024-05-07 VITALS
TEMPERATURE: 98 F | HEIGHT: 64 IN | OXYGEN SATURATION: 99 % | DIASTOLIC BLOOD PRESSURE: 75 MMHG | HEART RATE: 96 BPM | SYSTOLIC BLOOD PRESSURE: 113 MMHG | WEIGHT: 134 LBS | BODY MASS INDEX: 22.88 KG/M2

## 2024-05-07 DIAGNOSIS — K51.919 ULCERATIVE COLITIS WITH COMPLICATION, UNSPECIFIED LOCATION (MULTI): Primary | ICD-10-CM

## 2024-05-07 DIAGNOSIS — D50.0 IRON DEFICIENCY ANEMIA DUE TO CHRONIC BLOOD LOSS: ICD-10-CM

## 2024-05-07 PROCEDURE — 99213 OFFICE O/P EST LOW 20 MIN: CPT | Performed by: INTERNAL MEDICINE

## 2024-05-07 PROCEDURE — 1036F TOBACCO NON-USER: CPT | Performed by: INTERNAL MEDICINE

## 2024-05-07 ASSESSMENT — PAIN SCALES - GENERAL: PAINLEVEL: 0-NO PAIN

## 2024-05-07 NOTE — PROGRESS NOTES
REASON FOR VISIT:  ulcerative colitis    HPI:  Pastora Rawls is a 23 y.o. female who presents for follow up of ulcerative colitis.  (+) h/o juvenile idiopathic scoliosis.  Diagnosed with UC age 7 (presenting with GI bleeding, diarrhea). Treated with azathioprine and 5-ASA.  Previously followed in Peds GI, but then no F/U for a few years.     10/2023 ED visit for LE edema. Albumin at the time 2.3. Alkphos 242. AST ALT 15/7. Cr 0.46. Hb 7.1 (MCV 69.3). Plts 672.      Pt saw her PCP, Dr. Lockhart, on 2/2024. Labs with Hb down to 6.4 (MCV 67), plts 692. Albumin 2.5. alkphos 216. LFTs otherwise wnls. Ferritin 11. Pt was ordered for IV iron infusion, recommended to start liquid iron BID.      3/2023 seen as NP and reported 3-4 Bms per day, but can get up to 6-8 times per day, loose. Very diet dependent. Having blood in stool - off and on.  No tenesmus, slight pain with BM. Did report joint pain- specifically her fingers, no swelling of joints. No rashes. (+) weight loss (2021 lost about 50 lbs, but more stable recently). NO fevers.        4/2024 colonoscopy with Mckeon 3 colitis to hepatic flexure.  Path c/w UC.  Started on budesonide (Uceris) and mesalamine with plans to initiate Entyvio therapy--> first dose 4/25/24.    In follow-up today, overall doing OK.  Budesonide (Uceris) x 2 months helped, but even better since got first Entyvio.  She noticed a difference after first infusion.  BMs less frequent and less urgent.  No abdominal pain.  No blood.  Stools down to 3-4 daily; had been 6-7.  Still awakens around 3 AM to stool.  Larger gap between stools during the day.      No IBD EIMS.    Goes to [facility] for treatments.  Second dose is tomorrow.      REVIEW OF SYSTEMS    Allergies   Allergen Reactions    Aspirin Other    Gelatin Itching     Nausea/vomitting    Nsaids (Non-Steroidal Anti-Inflammatory Drug) Itching     Crohn's       Past Medical History:   Diagnosis Date    Anemia     History of lumbar  "fusion     s/p h/o scoliosis    Personal history of other diseases of the respiratory system     Personal history of asthma    Ulcerative colitis (Multi)     Ulcerative colitis (Multi)        Past Surgical History:   Procedure Laterality Date    BACK SURGERY  09/17/2014    Back Surgery    TONSILLECTOMY  06/11/2014    Tonsillectomy With Adenoidectomy       Current Outpatient Medications   Medication Sig Dispense Refill    budesonide ER (Uceris) 9 mg tablet Take 1 tablet (9 mg) by mouth once daily. (Patient not taking: Reported on 4/25/2024) 30 tablet 1    ferrous sulfate 300 mg (60 mg iron)/5 mL syrup Take 5 mL (60 mg of iron) by mouth once daily. 150 mL 11    iron sucrose (Venofer) 200 mg iron/10 mL injection Infuse 10 mL (200 mg) over 5 minutes into a venous catheter 1 (one) time per week. 20 mL 0    PRENAT 115-IRON FUM-FOLIC-DSS ORAL Take by mouth.      PRENATAL -IRON-FOLIC ACID ORAL Take by mouth.      vedolizumab (Entyvio) 300 mg injection Infuse 300 mg into a venous catheter 1 time for 1 dose.  For Induction:  0, 2 and 6 weeks 5 mL 0     No current facility-administered medications for this visit.       PHYSICAL EXAM:  LMP 03/22/2024    /75   Pulse 96   Temp 36.7 °C (98 °F)   Ht 1.626 m (5' 4\")   Wt 60.8 kg (134 lb)   LMP 03/22/2024   SpO2 99%   BMI 23.00 kg/m²   Vital signs reviewed  Patient alert and oriented in no acute distress  Anicteric  No cervical adenopathy  Abdomen soft and nontender without organomegaly or mass.  No rebound or guarding.  Bowel sounds present  Extremities without edema      Lab Results   Component Value Date    WBC 5.2 02/20/2024    HGB 6.5 (LL) 02/20/2024    HCT 24.8 (L) 02/20/2024    MCV 71 (L) 02/20/2024     (H) 02/20/2024     Lab Results   Component Value Date    ALT 9 02/09/2024    AST 12 02/09/2024    ALKPHOS 216 (H) 02/09/2024    BILITOT 0.2 02/09/2024             ASSESSMENT  # Pan ulcerative colitis-clinically seems to be improving after 1 dose of " vedolizumab.  Will continue vedolizumab to complete induction and then moved to maintenance.  Will see her in follow-up after she completes her first maintenance infusion and early August.    #Iron deficiency anemia due to pancolitis-will repeat labs to see if this is stabilized.    PLAN  1) continue Entyvio infusions as planned; if you have any issues with your infusions, please call my nurse, Marj Auguste RN, at   2) check stool fecal calprotectin as ordered  3) repeat labs to check anemia  4) follow-up in the office in 3 months at [facility] [place]  5)contact the office with any quesrtions or concerns.

## 2024-05-07 NOTE — PATIENT INSTRUCTIONS
It is great that you are starting to see some improvement with the vedolizumab.  Hopefully, continued therapy will only improve your symptoms even more.  As we discussed today:    PLAN  1) continue Entyvio infusions as planned; if you have any issues with your infusions, please call my nurse, Marj Auguste RN, at   2) check stool fecal calprotectin as ordered  3) repeat labs to check anemia  4) follow-up in the office in 3 months at [facility] on the [place] side  5)contact the office with any quesrtions or concerns.

## 2024-05-08 ENCOUNTER — INFUSION (OUTPATIENT)
Dept: INFUSION THERAPY | Facility: CLINIC | Age: 24
End: 2024-05-08
Payer: COMMERCIAL

## 2024-05-08 VITALS
RESPIRATION RATE: 18 BRPM | SYSTOLIC BLOOD PRESSURE: 90 MMHG | OXYGEN SATURATION: 99 % | HEART RATE: 84 BPM | DIASTOLIC BLOOD PRESSURE: 62 MMHG | TEMPERATURE: 97.7 F

## 2024-05-08 DIAGNOSIS — K51.219 ULCERATIVE PROCTITIS WITH COMPLICATION (MULTI): ICD-10-CM

## 2024-05-08 DIAGNOSIS — D50.0 IRON DEFICIENCY ANEMIA DUE TO CHRONIC BLOOD LOSS: ICD-10-CM

## 2024-05-08 PROCEDURE — 96365 THER/PROPH/DIAG IV INF INIT: CPT | Performed by: NURSE PRACTITIONER

## 2024-05-08 RX ORDER — FAMOTIDINE 10 MG/ML
20 INJECTION INTRAVENOUS ONCE AS NEEDED
Status: CANCELLED | OUTPATIENT
Start: 2024-05-09

## 2024-05-08 RX ORDER — ALBUTEROL SULFATE 0.83 MG/ML
3 SOLUTION RESPIRATORY (INHALATION) AS NEEDED
Status: CANCELLED | OUTPATIENT
Start: 2024-05-09

## 2024-05-08 RX ORDER — EPINEPHRINE 0.3 MG/.3ML
0.3 INJECTION SUBCUTANEOUS EVERY 5 MIN PRN
Status: CANCELLED | OUTPATIENT
Start: 2024-05-09

## 2024-05-08 RX ORDER — DIPHENHYDRAMINE HYDROCHLORIDE 50 MG/ML
50 INJECTION INTRAMUSCULAR; INTRAVENOUS AS NEEDED
Status: CANCELLED | OUTPATIENT
Start: 2024-05-09

## 2024-05-08 ASSESSMENT — ENCOUNTER SYMPTOMS
SHORTNESS OF BREATH: 0
DEPRESSION: 0
WOUND: 0
EYE PROBLEMS: 0
WHEEZING: 0
BLOOD IN STOOL: 0
UNEXPECTED WEIGHT CHANGE: 0
ARTHRALGIAS: 0
SORE THROAT: 0
DYSURIA: 0
FEVER: 0
CHILLS: 0
HEADACHES: 0
LEG SWELLING: 0
LIGHT-HEADEDNESS: 0
APPETITE CHANGE: 0
TROUBLE SWALLOWING: 0
ABDOMINAL PAIN: 1
FATIGUE: 0
PALPITATIONS: 0
DIZZINESS: 0
NAUSEA: 0
MYALGIAS: 0
VOICE CHANGE: 0
DIARRHEA: 0
NERVOUS/ANXIOUS: 0
CONSTIPATION: 0
NUMBNESS: 1
VOMITING: 0
FREQUENCY: 0
EXTREMITY WEAKNESS: 0
HEMATURIA: 0
COUGH: 0

## 2024-05-08 NOTE — PATIENT INSTRUCTIONS
Today :We administered vedolizumab (Entyvio) 300 mg in sodium chloride 0.9% 255 mL IV.     For:   1. Ulcerative proctitis with complication (Multi)         Your next appointment is due in:  4 WEEKS FROM TODAY FOR WEEK 6      Please read the  Medication Guide that was given to you and reviewed during todays visit.     (Tell all doctors including dentists that you are taking this medication)     Go to the emergency room or call 911 if:  -You have signs of allergic reaction:   -Rash, hives, itching.   -Swollen, blistered, peeling skin.   -Swelling of face, lips, mouth, tongue or throat.   -Tightness of chest, trouble breathing, swallowing or talking     Call your doctor:  - If IV / injection site gets red, warm, swollen, itchy or leaks fluid or pus.     (Leave dressing on your IV site for at least 2 hours and keep area clean and dry  - If you get sick or have symptoms of infection or are not feeling well for any reason.    (Wash your hands often, stay away from people who are sick)  - If you have side effects from your medication that do not go away or are bothersome.     (Refer to the teaching your nurse gave you for side effects to call your doctor about)    - Common side effects may include:  stuffy nose, headache, feeling tired, muscle aches, upset stomach  - Before receiving any vaccines     - Call [facility] at   If:  - You get sick, are on antibiotics, have had a recent vaccine, have surgery or dental work and your doctor wants your visit rescheduled.  - You need to cancel and reschedule your visit for any reason. Call at least 2 days before your visit if you need to cancel.   - Your insurance changes before your next visit.    (We will need to get approval from your new insurance. This can take up to two weeks.)     [facility] is opened Monday thru Friday. We are closed on weekends and holidays.   Voice mail will take your call if the center is closed. If you leave a  message please allow 24 hours for a call back during weekdays. If you leave a message on a weekend/holiday, we will call you back the next business day.

## 2024-05-08 NOTE — PROGRESS NOTES
[facility]   Infusion Clinic Note   Date: May 8, 2024   Name: Pastora Rawls  : 2000   MRN: [id number]         Reason for Visit: Follow-up and OP Infusion (PATIENT IS HERE FOR WEEK 2 OF ENTYVIO 300 MG. NEXT INFUSION IN 4 WEEKS FOR WEEK 6 )         Visit Type: INFUSION       Ordered By: DR. ROBIN MCKEON      Accompanied by:Relative      Diagnosis: Ulcerative proctitis with complication (Multi)       Allergies:   Allergies as of 2024 - Reviewed 2024   Allergen Reaction Noted    Aspirin Other 2024    Gelatin Itching 10/05/2023    Nsaids (non-steroidal anti-inflammatory drug) Itching 10/05/2023         Current Medications has a current medication list which includes the following prescription(s): ferrous sulfate, prenat 115/iron fum/folic/dss, prenatal no115/iron/folic acid, and vedolizumab.       Vitals:   Vitals:    24 1320 24 1450   BP: 95/63 90/62   Pulse: 83 84   Resp: 18 18   Temp: 36.4 °C (97.6 °F) 36.5 °C (97.7 °F)   SpO2: 99% 99%   LMP: 2024             Infusion Pre-procedure Checklist:   - Allergies reviewed: yes   - Medications reviewed: yes       - Previous reaction to current treatment: no      Assess patient for the concerns below. Document provider notification as appropriate.  - Active or recent infection with/without current antibiotic use: no  - Recent or planned invasive dental work: no  - Recent or planned surgeries: no  - Recently received or plans to receive vaccinations: no  - Has treatment related toxicities: no  - Is pregnant:  no      Pain: 3   - Is the pain different from normal: no   - Is the pain tolerable: yes   - Is your Doctor aware:  n/a      Labs: N/A         Fall Risk Screening: Lowe Fall Risk  Secondary Diagnosis: No  Ambulatory Aid: Walks without aid/bedrest/nurse assist  Intravenous Therapy/Heparin Lock: Yes  Gait/Transferring: Normal/bedrest/immobile  Mental Status: Oriented to own ability       Review Of  Systems:  Review of Systems   Constitutional:  Negative for appetite change, chills, fatigue, fever and unexpected weight change.   HENT:   Negative for hearing loss, mouth sores, sore throat, tinnitus, trouble swallowing and voice change.    Eyes:  Negative for eye problems.   Respiratory:  Negative for cough, shortness of breath and wheezing.    Cardiovascular:  Negative for chest pain, leg swelling and palpitations.   Gastrointestinal:  Positive for abdominal pain. Negative for blood in stool, constipation, diarrhea, nausea and vomiting.        PT WITH A DX OF IBD REPORTS #  3 FORMED  BM'S PER DAY. denies NOTING BLOOD/ REPORTS MUCUS TO STOOLS.  reports C/O OF ABDOMINAL PAIN AND/OR BOUTS OF NOCTURNAL STOOLING, BUT NOT AS FREQUENT.     Genitourinary:  Negative for dysuria, frequency and hematuria.    Musculoskeletal:  Negative for arthralgias and myalgias.   Skin:  Negative for itching, rash and wound.   Neurological:  Positive for numbness. Negative for dizziness, extremity weakness, headaches and light-headedness.        TINGLING IN LOWER LEGS IF SITS TOO LONG. (POSSIBLY FROM SPINAL SURGERY).   Psychiatric/Behavioral:  Negative for depression. The patient is not nervous/anxious.          Infusion Readiness:   - Assessment Concerns Related to Infusion: No  - Provider notified: n/a      Document Below Only If Indicated:   New Patient Education:    N/A (returning patient for continuation of therapy. Ongoing education provided as needed.)        Treatment Conditions & Drug Specific Questions:    Vedolizumab  (ENTYVIO)    (Unless otherwise specified on patient specific therapy plan):     TREATMENT CONDITIONS:  Unless otherwise specified on patient specific therapy plan HOLD and notify provider prior to proceeding with treatment if:   o Positive Hepatitis B Surface Ag  o Positive T-Spot  o Patient has signs or symptoms of Progressive Multifocal Leukoencephalopath (PML)     Lab Results   Component Value Date    HEPBSAG  "Nonreactive 02/20/2024      No results found for: \"NONUHFIRE\", \"NONUHSWGH\", \"NONUHFISH\", \"EXTHEPBSAG\"  Lab Results   Component Value Date    TBSIN Negative 02/09/2024        Labs reviewed and patient meets treatment conditions? Yes    DRUG SPECIFIC QUESTIONS:  Any signs or symptoms of Progressive Multifocal Leukoencephalopath (PML) which may include: memory loss, trouble thinking, loss of balance, difficulty talking or walking, loss of vision?  No      REMINDERS:  Recommended Vitals/Observation:  Vitals: Monitor vitals at start of infusion, at 15 minutes and at completion of infusion.  Observation: First 3 infusions patient may leave 15 minutes post infusion. Subsequent maintenance infusions: if no reaction, may leave immediately post infusion.        Weight Based Drug Calculations:    WEIGHT BASED DRUGS: NOT APPLICABLE / FLAT DOSE          Initiated By: BELLO AGUDELO RN     We administered vedolizumab (Entyvio) 300 mg in sodium chloride 0.9% 255 mL IV.      "

## 2024-05-08 NOTE — PROGRESS NOTES
Reason for Nutrition Visit:  Pt is a 23 y.o. female referred for   1. Low serum albumin        2. Ulcerative proctitis with complication (Multi)           Pt was referred by Dr Bonnie Palacio on 2/9/24.      Past Medical Hx:  Patient Active Problem List   Diagnosis    Annual physical exam    Low serum albumin    Ulcerative colitis with complication (Multi)    Visit for TB skin test    Reactive thrombocytosis    Family history of BRCA gene mutation    Iron deficiency anemia due to chronic blood loss    Calvin cataract (Multi)    Cataract    Ulcerative colitis (Multi)    Abnormal liver function tests    Anemia    Back pain    Hyperopia    Lower abdominal pain    Scoliosis    Pelvic pain in female        Food and Nutrition Hx:  She has had UC since she was 7 years old. She has been vegan for 8 years and was controlling her UC with that way of eating but then it she still had flare ups.  She was told she wasn't eating protein. She is still vegan. Currently she is not working but creating stuff daily.      24 Diet Recall:  Wake:  9 am, workout  Meal 1:  12 pm Smoothie, avocado toast or bagel w/vegan cream cheese -   Meal 2:   5 pm stir gonzalez - vegan chicken, carrots, peas, lo mein noodles or pasta bowls- w/lots of veggies  or tofu   Meal 3:  Snacks: tortilla chips or fruit mid afternoon and after dinner snack  Beverages:  Protein smoothie - almond milk and pea protein, pre-workout powder Amino Lean, water - 64 - 120 oz per day, sometimes add dye free powder  BED    Weight change:    Significant Weight Change: No  CW: (5/7/24) 134#  BMI: 23  Wt HX: (4/29/24)126#  UBW:  125-135# - fluctuates quite a bit depending on how active she is. She does not think it's d/t fluid retention or steroid.  She lost 50# in 3 months 200 to 150#    Lab Results   Component Value Date    HGBA1C 4.9 02/09/2024    CHOL 81 02/09/2024    LDLF 14 02/09/2023    TRIG 50 02/09/2024    BUN 13 02/09/2024    Albumin 2.5 mg/dl - most likely an  indicator of inflammation not how much protein she is eating    Food Preparation: Patient  Cooking Skills/Barriers: None reported  Grocery Shopping: Patient        Allergies: Milk and Gelatin, eggs, bleached flour  Intolerance: None  Appetite: Good  Intake: >75%  Number of meals per day:  2 -4 meals  GI Symptoms : 3 BM per day, goal for once a day, was 6 BM per day  Frequency: daily  Swallowing Difficulty: No problems with swallowing  Dentition : own    Eating Out Type: Dinner, Restaurant, and Take Out  1 times per month  Convenience Foods: Denies     Types of Activities: Walking and abs  Duration: 30-60 minutes  4x/week    Sleep duration/quality : 7+ hours, disrupted sleep, and uses bathroom in the middle of the night but goes back to sleep  Sleep disorders: none    Supplements: Iron and powdered greens in smoothie  daily      Nutrition Focused Physical Exam:    Performed/Deferred: Deferred as pt visually appears well-nourished with no signs of malnutrition        Malnutrition Present: No    Estimated Energy Needs:    Weight Maintanence: 1 g/pro/kg/day      Estimated Needs: 60-75 protein      Nutrition Diagnosis:    Diagnosis Statement 1:  Diagnosis Status: New  Diagnosis : Altered GI function  related to alteration in gastrointestinal tract structure and/or function as evidenced by  recent dx of UC          Nutrition Interventions:    1) The following is important for UC:  Eat small meals or snacks every 3 or 4 hours. Do not skip meals.  When you have symptoms, or if you are taking prednisone or budesonide, eat the foods in the Recommended Foods chart. These  foods are lower in fiber.  Eat a protein food or dairy product at every meal or snack if your body can tolerate it. See the Foods Recommended table for  ideas.  Drink a lot of fluids, at least 8 cups each day. Limit caffeinated, sugary drinks and beverages made with sugar substitutes.  Eat foods that have probiotics (yogurt, kefir) and prebiotics (bananas).  "Ask your RDN for other suggestions.  You may need to take supplements as part of your IBD treatment.  Take a chewable multivitamin with minerals. Ask your RDN for recommendations.  If you are taking methotrexate or sulfasalazine, take one multivitamin with minerals and a supplement with 1 milligram  of folic acid daily.  Take a chewable calcium supplement with vitamin D if you are not getting enough calcium from your diet.  Check with your health care provider before starting probiotic or prebiotic supplements.  When you don’t have symptoms (no blood in your stools), you are no longer taking prednisone or budesonide, and your  inflammation is mild, your health care provider may recommend that you begin including whole grains and a variety of fruits and  vegetables in your diet.  Only add one to two new foods to your diet each week in small amounts and monitor your symptoms.  Stop eating the new food if you develop abdominal pain or diarrhea. You can try it again after a few weeks.      2)     We reviewed the importance of having consistent meals and snacks throughout the day to increase metabolism to aid with weight loss. Pt should aim to consume a meal or snack every 3-4 hours which contains a lean protein (lean chicken, turkey, fish, eggs, low fat yogurt, nuts, peanut butter, bean) and healthy starch (fruits, whole grains)    3) Introduced patient to using a scale of 1-10 to rate hunger/satiety. Reviewed signs of hunger that patient might or might not feel in their body, and encouraged being attentive to notice these signals if they are present. Encouraged patient to honor hunger by eating when feeling at a \"3\" instead of waiting for hunger to become more severe. Encouraged using this method in conjunction with balanced foods on the plate, using the Plate Method.        4) We discussed plant based protein to consume at every meal including snacks - beans, trail mix, peanut butter Also discussed calcium rich plant " proteins to ensure adequate calcium of 1000 mg per day    5) If she is unable to only have 1 formed BM per day which is the goal, we can look at how much fiber she is consuming daily.  Reducing if needed.    Nutrition Goals:  Nutrition Goals : adequate protein and calcium  Consume meal or snack every 3-4 hours  Include both protein and starch at all meals and snacks    Nutrition Recommendations:  1)       Educational Handouts JBL: AND - IBD Nutrition Therapy , Vegetarian protein sources, plant based calcium foods

## 2024-05-09 LAB
CYTOLOGY CMNT CVX/VAG CYTO-IMP: NORMAL
LAB AP HPV GENOTYPE QUESTION: YES
LAB AP HPV HR: NORMAL
LABORATORY COMMENT REPORT: NORMAL
LMP START DATE: NORMAL
PATH REPORT.TOTAL CANCER: NORMAL

## 2024-05-13 ENCOUNTER — NUTRITION (OUTPATIENT)
Dept: NUTRITION | Facility: CLINIC | Age: 24
End: 2024-05-13
Payer: COMMERCIAL

## 2024-05-13 DIAGNOSIS — R77.0 LOW SERUM ALBUMIN: Primary | ICD-10-CM

## 2024-05-13 DIAGNOSIS — D50.0 IRON DEFICIENCY ANEMIA DUE TO CHRONIC BLOOD LOSS: Primary | ICD-10-CM

## 2024-05-13 DIAGNOSIS — Z71.3 DIETARY COUNSELING AND SURVEILLANCE: ICD-10-CM

## 2024-05-13 DIAGNOSIS — K51.219 ULCERATIVE PROCTITIS WITH COMPLICATION (MULTI): ICD-10-CM

## 2024-05-13 PROCEDURE — 97802 MEDICAL NUTRITION INDIV IN: CPT | Performed by: INTERNAL MEDICINE

## 2024-05-13 RX ORDER — DIPHENHYDRAMINE HYDROCHLORIDE 50 MG/ML
50 INJECTION INTRAMUSCULAR; INTRAVENOUS AS NEEDED
OUTPATIENT
Start: 2024-05-20

## 2024-05-13 RX ORDER — ALBUTEROL SULFATE 0.83 MG/ML
3 SOLUTION RESPIRATORY (INHALATION) AS NEEDED
OUTPATIENT
Start: 2024-05-20

## 2024-05-13 RX ORDER — FAMOTIDINE 10 MG/ML
20 INJECTION INTRAVENOUS ONCE AS NEEDED
OUTPATIENT
Start: 2024-05-20

## 2024-05-13 RX ORDER — IRON SUCROSE 20 MG/ML
200 INJECTION, SOLUTION INTRAVENOUS
Qty: 10 ML | Refills: 2 | Status: SHIPPED
Start: 2024-05-19

## 2024-05-13 RX ORDER — EPINEPHRINE 0.3 MG/.3ML
0.3 INJECTION SUBCUTANEOUS EVERY 5 MIN PRN
OUTPATIENT
Start: 2024-05-20

## 2024-05-20 ENCOUNTER — HOSPITAL ENCOUNTER (OUTPATIENT)
Dept: RADIOLOGY | Facility: CLINIC | Age: 24
End: 2024-05-20
Payer: COMMERCIAL

## 2024-05-21 ENCOUNTER — DOCUMENTATION (OUTPATIENT)
Dept: INFUSION THERAPY | Facility: CLINIC | Age: 24
End: 2024-05-21
Payer: COMMERCIAL

## 2024-05-21 DIAGNOSIS — D50.0 IRON DEFICIENCY ANEMIA DUE TO CHRONIC BLOOD LOSS: Primary | ICD-10-CM

## 2024-05-21 NOTE — PROGRESS NOTES
"Patient to be scheduled for acute venofer infusions.   For Diagnosis: Iron Deficiency Anemia    Labs…  Iron Studies completed on:   Lab Results   Component Value Date    IRON 13 (L) 02/20/2024    TIBC 278 02/20/2024    FERRITIN 11 02/09/2024      No results found for: \"TRANSFERRIN\"No results found for: \"TRANSFERRIN\"  Lab Results   Component Value Date    WBC 5.2 02/20/2024    HGB 6.5 (LL) 02/20/2024    HCT 24.8 (L) 02/20/2024    MCV 71 (L) 02/20/2024     (H) 02/20/2024        Urine Hcg test ordered prior to first infusion?  Yes (If female pt <60 years of age and with reproductive capability)  (If urine Hcg test ordered please instruct pt upon scheduling to drink 32 ounces of water 1 hour before arrival so bladder is full for needed urine sample)    Last infusion received: na (if continuation)    Due: anytime     This result meets treatment criteria.      [facility])  [facility]  [address] [place] [id number]  Phone: [phone]  Fax: [phone]  [email or web address]    "

## 2024-05-22 ENCOUNTER — TRANSCRIBE ORDERS (OUTPATIENT)
Dept: INFUSION THERAPY | Facility: CLINIC | Age: 24
End: 2024-05-22
Payer: COMMERCIAL

## 2024-05-22 ENCOUNTER — LAB (OUTPATIENT)
Dept: LAB | Facility: LAB | Age: 24
End: 2024-05-22
Payer: COMMERCIAL

## 2024-05-22 DIAGNOSIS — D50.0 IRON DEFICIENCY ANEMIA DUE TO CHRONIC BLOOD LOSS: ICD-10-CM

## 2024-05-22 DIAGNOSIS — K51.911 ULCERATIVE COLITIS WITH RECTAL BLEEDING, UNSPECIFIED LOCATION (MULTI): ICD-10-CM

## 2024-05-22 DIAGNOSIS — D50.0 IRON DEFICIENCY ANEMIA DUE TO CHRONIC BLOOD LOSS: Primary | ICD-10-CM

## 2024-05-22 DIAGNOSIS — K51.919 ULCERATIVE COLITIS WITH COMPLICATION, UNSPECIFIED LOCATION (MULTI): ICD-10-CM

## 2024-05-22 LAB
ERYTHROCYTE [DISTWIDTH] IN BLOOD BY AUTOMATED COUNT: 17.2 % (ref 11.5–14.5)
FERRITIN SERPL-MCNC: 16 NG/ML (ref 8–150)
HCT VFR BLD AUTO: 30.4 % (ref 36–46)
HGB BLD-MCNC: 8.8 G/DL (ref 12–16)
IRON SATN MFR SERPL: 5 % (ref 25–45)
IRON SERPL-MCNC: 13 UG/DL (ref 35–150)
MCH RBC QN AUTO: 21.9 PG (ref 26–34)
MCHC RBC AUTO-ENTMCNC: 28.9 G/DL (ref 32–36)
MCV RBC AUTO: 76 FL (ref 80–100)
NRBC BLD-RTO: 0 /100 WBCS (ref 0–0)
PLATELET # BLD AUTO: 640 X10*3/UL (ref 150–450)
RBC # BLD AUTO: 4.02 X10*6/UL (ref 4–5.2)
TIBC SERPL-MCNC: 284 UG/DL (ref 240–445)
UIBC SERPL-MCNC: 271 UG/DL (ref 110–370)
WBC # BLD AUTO: 5.7 X10*3/UL (ref 4.4–11.3)

## 2024-05-22 PROCEDURE — 83540 ASSAY OF IRON: CPT

## 2024-05-22 PROCEDURE — 36415 COLL VENOUS BLD VENIPUNCTURE: CPT

## 2024-05-22 PROCEDURE — 85027 COMPLETE CBC AUTOMATED: CPT

## 2024-05-22 PROCEDURE — 83550 IRON BINDING TEST: CPT

## 2024-05-22 PROCEDURE — 83993 ASSAY FOR CALPROTECTIN FECAL: CPT

## 2024-05-22 PROCEDURE — 82728 ASSAY OF FERRITIN: CPT

## 2024-05-22 RX ORDER — FAMOTIDINE 10 MG/ML
20 INJECTION INTRAVENOUS ONCE AS NEEDED
Status: CANCELLED | OUTPATIENT
Start: 2024-05-22

## 2024-05-22 RX ORDER — ALBUTEROL SULFATE 0.83 MG/ML
3 SOLUTION RESPIRATORY (INHALATION) AS NEEDED
Status: CANCELLED | OUTPATIENT
Start: 2024-05-22

## 2024-05-22 RX ORDER — DIPHENHYDRAMINE HYDROCHLORIDE 50 MG/ML
50 INJECTION INTRAMUSCULAR; INTRAVENOUS AS NEEDED
Status: CANCELLED | OUTPATIENT
Start: 2024-05-22

## 2024-05-22 RX ORDER — EPINEPHRINE 0.3 MG/.3ML
0.3 INJECTION SUBCUTANEOUS EVERY 5 MIN PRN
Status: CANCELLED | OUTPATIENT
Start: 2024-05-22

## 2024-05-26 LAB — CALPROTECTIN STL-MCNT: 539 UG/G

## 2024-05-31 ENCOUNTER — INFUSION (OUTPATIENT)
Dept: INFUSION THERAPY | Facility: CLINIC | Age: 24
End: 2024-05-31
Payer: COMMERCIAL

## 2024-05-31 VITALS
TEMPERATURE: 96.8 F | SYSTOLIC BLOOD PRESSURE: 118 MMHG | RESPIRATION RATE: 18 BRPM | OXYGEN SATURATION: 100 % | HEART RATE: 73 BPM | DIASTOLIC BLOOD PRESSURE: 71 MMHG

## 2024-05-31 DIAGNOSIS — D50.0 IRON DEFICIENCY ANEMIA DUE TO CHRONIC BLOOD LOSS: ICD-10-CM

## 2024-05-31 DIAGNOSIS — K51.911 ULCERATIVE COLITIS WITH RECTAL BLEEDING, UNSPECIFIED LOCATION (MULTI): ICD-10-CM

## 2024-05-31 PROCEDURE — 96365 THER/PROPH/DIAG IV INF INIT: CPT | Performed by: REGISTERED NURSE

## 2024-05-31 RX ORDER — ALBUTEROL SULFATE 0.83 MG/ML
3 SOLUTION RESPIRATORY (INHALATION) AS NEEDED
Status: CANCELLED | OUTPATIENT
Start: 2024-06-03

## 2024-05-31 RX ORDER — FAMOTIDINE 10 MG/ML
20 INJECTION INTRAVENOUS ONCE AS NEEDED
Status: CANCELLED | OUTPATIENT
Start: 2024-06-03

## 2024-05-31 RX ORDER — EPINEPHRINE 0.3 MG/.3ML
0.3 INJECTION SUBCUTANEOUS EVERY 5 MIN PRN
Status: CANCELLED | OUTPATIENT
Start: 2024-06-03

## 2024-05-31 RX ORDER — DIPHENHYDRAMINE HYDROCHLORIDE 50 MG/ML
50 INJECTION INTRAMUSCULAR; INTRAVENOUS AS NEEDED
Status: CANCELLED | OUTPATIENT
Start: 2024-06-03

## 2024-05-31 ASSESSMENT — ENCOUNTER SYMPTOMS
CARDIOVASCULAR NEGATIVE: 1
BACK PAIN: 1
RESPIRATORY NEGATIVE: 1
GASTROINTESTINAL NEGATIVE: 1
ARTHRALGIAS: 1
NEUROLOGICAL NEGATIVE: 1
FATIGUE: 1

## 2024-05-31 ASSESSMENT — PAIN SCALES - GENERAL: PAINLEVEL: 3

## 2024-05-31 NOTE — PATIENT INSTRUCTIONS
Today :We administered iron sucrose (Venofer) 200 mg in sodium chloride 0.9% 100 mL IV.     For:   1. Iron deficiency anemia due to chronic blood loss    2. Ulcerative colitis with rectal bleeding, unspecified location (Multi)         Your next appointment is due in:  6/4 @ 3 PM        Please read the  Medication Guide that was given to you and reviewed during todays visit.     (Tell all doctors including dentists that you are taking this medication)     Go to the emergency room or call 911 if:  -You have signs of allergic reaction:   -Rash, hives, itching.   -Swollen, blistered, peeling skin.   -Swelling of face, lips, mouth, tongue or throat.   -Tightness of chest, trouble breathing, swallowing or talking     Call your doctor:  - If IV / injection site gets red, warm, swollen, itchy or leaks fluid or pus.     (Leave dressing on your IV site for at least 2 hours and keep area clean and dry  - If you get sick or have symptoms of infection or are not feeling well for any reason.    (Wash your hands often, stay away from people who are sick)  - If you have side effects from your medication that do not go away or are bothersome.     (Refer to the teaching your nurse gave you for side effects to call your doctor about)    - Common side effects may include:  stuffy nose, headache, feeling tired, muscle aches, upset stomach  - Before receiving any vaccines     - Call [facility] at   If:  - You get sick, are on antibiotics, have had a recent vaccine, have surgery or dental work and your doctor wants your visit rescheduled.  - You need to cancel and reschedule your visit for any reason. Call at least 2 days before your visit if you need to cancel.   - Your insurance changes before your next visit.    (We will need to get approval from your new insurance. This can take up to two weeks.)     The [facility] is opened Monday thru Friday. We are closed on weekends and holidays.   Voice mail  will take your call if the center is closed. If you leave a message please allow 24 hours for a call back during weekdays. If you leave a message on a weekend/holiday, we will call you back the next business day.

## 2024-05-31 NOTE — PROGRESS NOTES
[facility]   Infusion Clinic Note   Date: May 31, 2024   Name: Pastora Rawls  : 2000   MRN: [id number]         Reason for Visit: OP Infusion (PATIENT HERE FOR DAY  VENOFER 200 MG INFUSION)      Accompanied by:Relative   Visit Type:: Infusion   Diagnosis: Iron deficiency anemia due to chronic blood loss    Ulcerative colitis with rectal bleeding, unspecified location (Multi)    Allergies:   Allergies as of 2024 - Reviewed 2024   Allergen Reaction Noted    Aspirin Other 2024    Gelatin Itching 10/05/2023    Nsaids (non-steroidal anti-inflammatory drug) Itching 10/05/2023      Current Meds has a current medication list which includes the following prescription(s): ferrous sulfate, venofer, prenat 115/iron fum/folic/dss, prenatal no115/iron/folic acid, and vedolizumab, and the following Facility-Administered Medications: iron sucrose (Venofer) 200 mg in sodium chloride 0.9% 100 mL IV.        Vitals:  Vitals:    24 1404   BP: 105/63   Pulse: 85   Resp: 18   Temp: 36 °C (96.8 °F)   SpO2: 100%      Infusion Pre-procedure Checklist   Allergies reviewed: yes   Medications reviewed: yes   Contraindications to treatment:No   Previous reaction to current treatment:No   Current Health Issues: None   Pain: 3 [3]' Back [4]   Is the pain different from normal: No   Is the pain tolerable: yes   Is your Doctor aware: yes   Contraindications based on patient history: No   Provider notified: Not applicable   Labs: Labs reviewed   Fall Risk Screening: Lowe Fall Risk  History of Falling, Immediate or Within 3 Months: No  Secondary Diagnosis: No  Ambulatory Aid: Walks without aid/bedrest/nurse assist  Intravenous Therapy/Heparin Lock: No  Gait/Transferring: Normal/bedrest/immobile  Mental Status: Oriented to own ability  Lowe Fall Risk Score: 0    Review of Systems   Constitutional:  Positive for fatigue.   HENT:  Negative.     Respiratory: Negative.     Cardiovascular:  Negative.    Gastrointestinal: Negative.    Musculoskeletal:  Positive for arthralgias and back pain.   Skin: Negative.    Neurological: Negative.       Negative for complaint: [] all other systems have been reviewed and are negative for complaint   Infusion Readiness:   Assessment Concerns Related to Infusion: No  Provider notified: n/a  Assess patient for the concerns below. Document provider notification as appropriate:  - Does not meet criteria to treat No  - Has an active or recent infection with/without current antibiotic use No  - Has recent/planned dental work No  - Has recent/planned surgeries No  - Has recently received or plans to receive vaccinations No  - Has treatment related toxicities No  - Is pregnant (unless noted otherwise) No    Initiated By: Mia Jackson RN   Time: 2:13 PM     We administered iron sucrose (Venofer) 200 mg in sodium chloride 0.9% 100 mL IV.

## 2024-06-04 ENCOUNTER — INFUSION (OUTPATIENT)
Dept: INFUSION THERAPY | Facility: CLINIC | Age: 24
End: 2024-06-04
Payer: COMMERCIAL

## 2024-06-04 VITALS
RESPIRATION RATE: 16 BRPM | TEMPERATURE: 98.2 F | HEART RATE: 99 BPM | SYSTOLIC BLOOD PRESSURE: 106 MMHG | OXYGEN SATURATION: 100 % | DIASTOLIC BLOOD PRESSURE: 74 MMHG

## 2024-06-04 DIAGNOSIS — D50.0 IRON DEFICIENCY ANEMIA DUE TO CHRONIC BLOOD LOSS: ICD-10-CM

## 2024-06-04 DIAGNOSIS — K51.911 ULCERATIVE COLITIS WITH RECTAL BLEEDING, UNSPECIFIED LOCATION (MULTI): ICD-10-CM

## 2024-06-04 PROCEDURE — 96365 THER/PROPH/DIAG IV INF INIT: CPT | Performed by: NURSE PRACTITIONER

## 2024-06-04 RX ORDER — DIPHENHYDRAMINE HYDROCHLORIDE 50 MG/ML
50 INJECTION INTRAMUSCULAR; INTRAVENOUS AS NEEDED
Status: CANCELLED | OUTPATIENT
Start: 2024-06-06

## 2024-06-04 RX ORDER — EPINEPHRINE 0.3 MG/.3ML
0.3 INJECTION SUBCUTANEOUS EVERY 5 MIN PRN
Status: CANCELLED | OUTPATIENT
Start: 2024-06-06

## 2024-06-04 RX ORDER — ALBUTEROL SULFATE 0.83 MG/ML
3 SOLUTION RESPIRATORY (INHALATION) AS NEEDED
Status: CANCELLED | OUTPATIENT
Start: 2024-06-06

## 2024-06-04 RX ORDER — FAMOTIDINE 10 MG/ML
20 INJECTION INTRAVENOUS ONCE AS NEEDED
Status: CANCELLED | OUTPATIENT
Start: 2024-06-06

## 2024-06-04 ASSESSMENT — ENCOUNTER SYMPTOMS
RESPIRATORY NEGATIVE: 1
CONSTITUTIONAL NEGATIVE: 1
CARDIOVASCULAR NEGATIVE: 1
NEUROLOGICAL NEGATIVE: 1
EYES NEGATIVE: 1
ENDOCRINE NEGATIVE: 1
GASTROINTESTINAL NEGATIVE: 1
MUSCULOSKELETAL NEGATIVE: 1
HEMATOLOGIC/LYMPHATIC NEGATIVE: 1

## 2024-06-04 NOTE — PATIENT INSTRUCTIONS
Today :Pastora DOBSON Emypranay had no medications administered during this visit.     For:   1. Iron deficiency anemia due to chronic blood loss    2. Ulcerative colitis with rectal bleeding, unspecified location (Multi)         Your next appointment is due in:  see face sheet        Please read the  Medication Guide that was given to you and reviewed during todays visit.     (Tell all doctors including dentists that you are taking this medication)     Go to the emergency room or call 911 if:  -You have signs of allergic reaction:   -Rash, hives, itching.   -Swollen, blistered, peeling skin.   -Swelling of face, lips, mouth, tongue or throat.   -Tightness of chest, trouble breathing, swallowing or talking     Call your doctor:  - If IV / injection site gets red, warm, swollen, itchy or leaks fluid or pus.     (Leave dressing on your IV site for at least 2 hours and keep area clean and dry  - If you get sick or have symptoms of infection or are not feeling well for any reason.    (Wash your hands often, stay away from people who are sick)  - If you have side effects from your medication that do not go away or are bothersome.     (Refer to the teaching your nurse gave you for side effects to call your doctor about)    - Common side effects may include:  stuffy nose, headache, feeling tired, muscle aches, upset stomach  - Before receiving any vaccines     - Call the [facility] at  [phone] If:  - You get sick, are on antibiotics, have had a recent vaccine, have surgery or dental work and your doctor wants your visit rescheduled.  - You need to cancel and reschedule your visit for any reason. Call at least 2 days before your visit if you need to cancel.   - Your insurance changes before your next visit.    (We will need to get approval from your new insurance. This can take up to two weeks.)     [facility] is opened Monday thru Friday. We are closed on weekends and holidays.   Voice  mail will take your call if the center is closed. If you leave a message please allow 24 hours for a call back during weekdays. If you leave a message on a weekend/holiday, we will call you back the next business day.

## 2024-06-04 NOTE — PROGRESS NOTES
[facility]   Infusion Clinic Note   Date: 2024   Name: Pastora Rawls  : 2000   MRN: [id number]         Reason for Visit: OP Infusion (200 mg venofer infusion)         Today: Pastora Rawls had no medications administered during this visit.       Visit Type: INFUSION             Accompanied by:Self      Diagnosis: Iron deficiency anemia due to chronic blood loss    Ulcerative colitis with rectal bleeding, unspecified location (Multi)       Allergies:   Allergies as of 2024 - Reviewed 2024   Allergen Reaction Noted   • Aspirin Other 2024   • Gelatin Itching 10/05/2023   • Nsaids (non-steroidal anti-inflammatory drug) Itching 10/05/2023         Current Medications has a current medication list which includes the following prescription(s): ferrous sulfate, venofer, prenat 115/iron fum/folic/dss, prenatal no115/iron/folic acid, and vedolizumab, and the following Facility-Administered Medications: iron sucrose (Venofer) 200 mg in sodium chloride 0.9% 100 mL IV.       Vitals:   Vitals:    24 1453   BP: 106/74   Pulse: 99   Resp: 16   Temp: 36.8 °C (98.2 °F)   SpO2: 100%             Infusion Pre-procedure Checklist:   - Allergies reviewed: yes   - Medications reviewed: yes       - Previous reaction to current treatment: no      Assess patient for the concerns below. Document provider notification as appropriate.  - Active or recent infection with/without current antibiotic use: no  - Recent or planned invasive dental work: no  - Recent or planned surgeries: no  - Recently received or plans to receive vaccinations: no  - Has treatment related toxicities: no  - Is pregnant:  yes      Pain: 0   - Is the pain different from normal: n/a   - Is the pain tolerable: n/a   - Is your Doctor aware:  n/a      Labs: N/A         Fall Risk Screening: Chrissy Fall Risk  History of Falling, Immediate or Within 3 Months: No  Secondary Diagnosis: No  Ambulatory Aid: Walks  without aid/bedrest/nurse assist  Intravenous Therapy/Heparin Lock: No  Gait/Transferring: Normal/bedrest/immobile  Mental Status: Oriented to own ability  Lowe Fall Risk Score: 0       Review Of Systems:  Review of Systems   Constitutional: Negative.    HENT:  Negative.     Eyes: Negative.    Respiratory: Negative.     Cardiovascular: Negative.    Gastrointestinal: Negative.    Endocrine: Negative.    Genitourinary: Negative.     Musculoskeletal: Negative.    Skin: Negative.    Neurological: Negative.    Hematological: Negative.    Psychiatric/Behavioral:          Not assessed         Infusion Readiness:   - Assessment Concerns Related to Infusion: No  - Provider notified: n/a      Document Below Only If Indicated:   New Patient Education:    N/A (returning patient for continuation of therapy. Ongoing education provided as needed.)        Treatment Conditions & Drug Specific Questions:    NOT APPLICABLE        Weight Based Drug Calculations:    WEIGHT BASED DRUGS: NOT APPLICABLE / FLAT DOSE          Initiated By: Veronica Berger RN

## 2024-06-05 ENCOUNTER — INFUSION (OUTPATIENT)
Dept: INFUSION THERAPY | Facility: CLINIC | Age: 24
End: 2024-06-05
Payer: COMMERCIAL

## 2024-06-05 VITALS
HEART RATE: 87 BPM | SYSTOLIC BLOOD PRESSURE: 103 MMHG | OXYGEN SATURATION: 99 % | DIASTOLIC BLOOD PRESSURE: 59 MMHG | RESPIRATION RATE: 16 BRPM | TEMPERATURE: 97 F

## 2024-06-05 DIAGNOSIS — K51.219 ULCERATIVE PROCTITIS WITH COMPLICATION (MULTI): Primary | ICD-10-CM

## 2024-06-05 PROCEDURE — 96365 THER/PROPH/DIAG IV INF INIT: CPT | Performed by: NURSE PRACTITIONER

## 2024-06-05 RX ORDER — DIPHENHYDRAMINE HYDROCHLORIDE 50 MG/ML
50 INJECTION INTRAMUSCULAR; INTRAVENOUS AS NEEDED
OUTPATIENT
Start: 2024-06-06

## 2024-06-05 RX ORDER — EPINEPHRINE 0.3 MG/.3ML
0.3 INJECTION SUBCUTANEOUS EVERY 5 MIN PRN
OUTPATIENT
Start: 2024-06-06

## 2024-06-05 RX ORDER — ALBUTEROL SULFATE 0.83 MG/ML
3 SOLUTION RESPIRATORY (INHALATION) AS NEEDED
OUTPATIENT
Start: 2024-06-06

## 2024-06-05 RX ORDER — FAMOTIDINE 10 MG/ML
20 INJECTION INTRAVENOUS ONCE AS NEEDED
OUTPATIENT
Start: 2024-06-06

## 2024-06-05 ASSESSMENT — ENCOUNTER SYMPTOMS
EYE PROBLEMS: 0
DYSURIA: 0
HEMATURIA: 0
FEVER: 0
HEADACHES: 0
WHEEZING: 0
ABDOMINAL PAIN: 1
UNEXPECTED WEIGHT CHANGE: 0
LIGHT-HEADEDNESS: 0
BLOOD IN STOOL: 0
APPETITE CHANGE: 0
DIZZINESS: 0
CONSTIPATION: 0
FATIGUE: 1
SORE THROAT: 0
MYALGIAS: 0
NERVOUS/ANXIOUS: 0
NUMBNESS: 1
VOICE CHANGE: 0
FREQUENCY: 0
NAUSEA: 0
DEPRESSION: 0
WOUND: 0
VOMITING: 0
ARTHRALGIAS: 0
LEG SWELLING: 0
CHILLS: 0
EXTREMITY WEAKNESS: 0
DIARRHEA: 0
SHORTNESS OF BREATH: 0
TROUBLE SWALLOWING: 0
PALPITATIONS: 0
COUGH: 0

## 2024-06-05 NOTE — PATIENT INSTRUCTIONS
Today :We administered vedolizumab (Entyvio) 300 mg in sodium chloride 0.9% 255 mL IV.     For:   1. Ulcerative proctitis with complication (Multi)         Your next Entyvio appointment is due in:  8 weeks        Please read the  Medication Guide that was given to you and reviewed during todays visit.     (Tell all doctors including dentists that you are taking this medication)     Go to the emergency room or call 911 if:  -You have signs of allergic reaction:   -Rash, hives, itching.   -Swollen, blistered, peeling skin.   -Swelling of face, lips, mouth, tongue or throat.   -Tightness of chest, trouble breathing, swallowing or talking     Call your doctor:  - If IV / injection site gets red, warm, swollen, itchy or leaks fluid or pus.     (Leave dressing on your IV site for at least 2 hours and keep area clean and dry  - If you get sick or have symptoms of infection or are not feeling well for any reason.    (Wash your hands often, stay away from people who are sick)  - If you have side effects from your medication that do not go away or are bothersome.     (Refer to the teaching your nurse gave you for side effects to call your doctor about)    - Common side effects may include:  stuffy nose, headache, feeling tired, muscle aches, upset stomach  - Before receiving any vaccines     - [facility] at   If:  - You get sick, are on antibiotics, have had a recent vaccine, have surgery or dental work and your doctor wants your visit rescheduled.  - You need to cancel and reschedule your visit for any reason. Call at least 2 days before your visit if you need to cancel.   - Your insurance changes before your next visit.    (We will need to get approval from your new insurance. This can take up to two weeks.)     [facility] is opened Monday thru Friday. We are closed on weekends and holidays.   Voice mail will take your call if the center is closed. If you leave a message please  allow 24 hours for a call back during weekdays. If you leave a message on a weekend/holiday, we will call you back the next business day.

## 2024-06-05 NOTE — PROGRESS NOTES
[facility]   Infusion Clinic Note   Date: 2024   Name: Pastora Rawls  : 2000   MRN: [id number]         Reason for Visit: OP Infusion (Entyvio 300mg IV - week 6 of induction )         Visit Type: INFUSION       Ordered By: DR. ROBIN MCKEON      Accompanied by:Relative      Diagnosis: Ulcerative proctitis with complication (Multi)       Allergies:   Allergies as of 2024 - Reviewed 2024   Allergen Reaction Noted    Aspirin Other 2024    Gelatin Itching 10/05/2023    Nsaids (non-steroidal anti-inflammatory drug) Itching 10/05/2023         Current Medications has a current medication list which includes the following prescription(s): ferrous sulfate, venofer, prenat 115/iron fum/folic/dss, prenatal no115/iron/folic acid, and vedolizumab.       Vitals:   Vitals:    24 1415 24 1450 24 1504 24 1519   BP: 94/61  Comment: nurse took vitals 93/62 90/59  Comment: nurse notified 103/59  Comment: nurse notified   Pulse: 96 83 90 87   Resp: 16 16 16 16   Temp: 37 °C (98.6 °F) 36.3 °C (97.4 °F) 36.1 °C (97 °F) 36.1 °C (97 °F)   SpO2: 100% 99% 99% 99%             Infusion Pre-procedure Checklist:   - Allergies reviewed: yes   - Medications reviewed: yes       - Previous reaction to current treatment: no      Assess patient for the concerns below. Document provider notification as appropriate.  - Active or recent infection with/without current antibiotic use: no  - Recent or planned invasive dental work: no  - Recent or planned surgeries: no  - Recently received or plans to receive vaccinations: no  - Has treatment related toxicities: no  - Is pregnant:  no      Pain: 3   - Is the pain different from normal: no   - Is the pain tolerable: yes   - Is your Doctor aware:  n/a      Labs: N/A         Fall Risk Screening:         Review Of Systems:  Review of Systems   Constitutional:  Positive for fatigue. Negative for appetite change, chills, fever  "and unexpected weight change.   HENT:   Negative for hearing loss, mouth sores, sore throat, tinnitus, trouble swallowing and voice change.    Eyes:  Negative for eye problems.   Respiratory:  Negative for cough, shortness of breath and wheezing.    Cardiovascular:  Negative for chest pain, leg swelling and palpitations.   Gastrointestinal:  Positive for abdominal pain. Negative for blood in stool, constipation, diarrhea, nausea and vomiting.        PT WITH A DX OF IBD REPORTS - reports improvement in symptoms since starting Entyvio     Genitourinary:  Negative for dysuria, frequency and hematuria.    Musculoskeletal:  Negative for arthralgias and myalgias.   Skin:  Negative for itching, rash and wound.   Neurological:  Positive for numbness. Negative for dizziness, extremity weakness, headaches and light-headedness.        TINGLING IN LOWER LEGS IF SITS TOO LONG. (POSSIBLY FROM SPINAL SURGERY).   Psychiatric/Behavioral:  Negative for depression. The patient is not nervous/anxious.          Infusion Readiness:   - Assessment Concerns Related to Infusion: No  - Provider notified: n/a      Document Below Only If Indicated:   New Patient Education:    N/A (returning patient for continuation of therapy. Ongoing education provided as needed.)        Treatment Conditions & Drug Specific Questions:    Vedolizumab  (ENTYVIO)    (Unless otherwise specified on patient specific therapy plan):     TREATMENT CONDITIONS:  Unless otherwise specified on patient specific therapy plan HOLD and notify provider prior to proceeding with treatment if:   o Positive Hepatitis B Surface Ag  o Positive T-Spot  o Patient has signs or symptoms of Progressive Multifocal Leukoencephalopath (PML)     Lab Results   Component Value Date    HEPBSAG Nonreactive 02/20/2024      No results found for: \"NONUHFIRE\", \"NONUHSWGH\", \"NONUHFISH\", \"EXTHEPBSAG\"  Lab Results   Component Value Date    TBSIN Negative 02/09/2024        Labs reviewed and patient " meets treatment conditions? Yes    DRUG SPECIFIC QUESTIONS:  Any signs or symptoms of Progressive Multifocal Leukoencephalopath (PML) which may include: memory loss, trouble thinking, loss of balance, difficulty talking or walking, loss of vision?  No      REMINDERS:  Recommended Vitals/Observation:  Vitals: Monitor vitals at start of infusion, at 15 minutes and at completion of infusion.  Observation: First 3 infusions patient may leave 15 minutes post infusion. Subsequent maintenance infusions: if no reaction, may leave immediately post infusion.        Weight Based Drug Calculations:    WEIGHT BASED DRUGS: NOT APPLICABLE / FLAT DOSE          Initiated By: BELLO AGUDELO RN     We administered vedolizumab (Entyvio) 300 mg in sodium chloride 0.9% 255 mL IV.

## 2024-06-07 ENCOUNTER — INFUSION (OUTPATIENT)
Dept: INFUSION THERAPY | Facility: CLINIC | Age: 24
End: 2024-06-07
Payer: COMMERCIAL

## 2024-06-07 VITALS
TEMPERATURE: 97.4 F | SYSTOLIC BLOOD PRESSURE: 100 MMHG | DIASTOLIC BLOOD PRESSURE: 65 MMHG | HEART RATE: 96 BPM | OXYGEN SATURATION: 100 % | RESPIRATION RATE: 16 BRPM

## 2024-06-07 DIAGNOSIS — K51.911 ULCERATIVE COLITIS WITH RECTAL BLEEDING, UNSPECIFIED LOCATION (MULTI): ICD-10-CM

## 2024-06-07 DIAGNOSIS — D50.0 IRON DEFICIENCY ANEMIA DUE TO CHRONIC BLOOD LOSS: ICD-10-CM

## 2024-06-07 PROCEDURE — 96365 THER/PROPH/DIAG IV INF INIT: CPT | Performed by: NURSE PRACTITIONER

## 2024-06-07 RX ORDER — DIPHENHYDRAMINE HYDROCHLORIDE 50 MG/ML
50 INJECTION INTRAMUSCULAR; INTRAVENOUS AS NEEDED
Status: CANCELLED | OUTPATIENT
Start: 2024-06-10

## 2024-06-07 RX ORDER — ALBUTEROL SULFATE 0.83 MG/ML
3 SOLUTION RESPIRATORY (INHALATION) AS NEEDED
Status: CANCELLED | OUTPATIENT
Start: 2024-06-10

## 2024-06-07 RX ORDER — EPINEPHRINE 0.3 MG/.3ML
0.3 INJECTION SUBCUTANEOUS EVERY 5 MIN PRN
Status: CANCELLED | OUTPATIENT
Start: 2024-06-10

## 2024-06-07 RX ORDER — FAMOTIDINE 10 MG/ML
20 INJECTION INTRAVENOUS ONCE AS NEEDED
Status: CANCELLED | OUTPATIENT
Start: 2024-06-10

## 2024-06-07 ASSESSMENT — ENCOUNTER SYMPTOMS
EYES NEGATIVE: 1
GASTROINTESTINAL NEGATIVE: 1
CONSTITUTIONAL NEGATIVE: 1
RESPIRATORY NEGATIVE: 1
CARDIOVASCULAR NEGATIVE: 1
ENDOCRINE NEGATIVE: 1
NEUROLOGICAL NEGATIVE: 1
PSYCHIATRIC NEGATIVE: 1
MUSCULOSKELETAL NEGATIVE: 1
HEMATOLOGIC/LYMPHATIC NEGATIVE: 1

## 2024-06-07 NOTE — PROGRESS NOTES
[facility]   Infusion Clinic Note   Date: 2024   Name: Pastora Rawls  : 2000   MRN: [id number]         Reason for Visit: OP Infusion (PATIENT HERE FOR HER VENOFER 200MG INFUSION 3/5)         Today: We administered iron sucrose (Venofer) 200 mg in sodium chloride 0.9% 100 mL IV.       Visit Type: INFUSION       Ordered By: ABDON DOMÍNGUEZ MD      Accompanied by:Self      Diagnosis: Iron deficiency anemia due to chronic blood loss    Ulcerative colitis with rectal bleeding, unspecified location (Multi)       Allergies:   Allergies as of 2024 - Reviewed 2024   Allergen Reaction Noted    Aspirin Other 2024    Gelatin Itching 10/05/2023    Nsaids (non-steroidal anti-inflammatory drug) Itching 10/05/2023         Current Medications has a current medication list which includes the following prescription(s): ferrous sulfate, venofer, prenat 115/iron fum/folic/dss, prenatal no115/iron/folic acid, and vedolizumab.       Vitals:   Vitals:    24 1352   BP: 100/65   Pulse: 96   Resp: 16   Temp: 36.3 °C (97.4 °F)   SpO2: 100%             Infusion Pre-procedure Checklist:   - Allergies reviewed: yes   - Medications reviewed: yes       - Previous reaction to current treatment: no      Assess patient for the concerns below. Document provider notification as appropriate.  - Active or recent infection with/without current antibiotic use: no  - Recent or planned invasive dental work: no  - Recent or planned surgeries: no  - Recently received or plans to receive vaccinations: no  - Has treatment related toxicities: no  - Is pregnant:  no      Pain: 0   - Is the pain different from normal: n/a   - Is the pain tolerable: n/a   - Is your Doctor aware:  n/a      Labs: N/A         Fall Risk Screening: Chrissy Fall Risk  History of Falling, Immediate or Within 3 Months: No  Secondary Diagnosis: No  Ambulatory Aid: Walks without aid/bedrest/nurse assist  Intravenous  Therapy/Heparin Lock: No  Gait/Transferring: Normal/bedrest/immobile  Mental Status: Oriented to own ability  Lowe Fall Risk Score: 0       Review Of Systems:  Review of Systems   Constitutional: Negative.    HENT:  Negative.     Eyes: Negative.    Respiratory: Negative.     Cardiovascular: Negative.    Gastrointestinal: Negative.    Endocrine: Negative.    Genitourinary: Negative.     Musculoskeletal: Negative.    Skin: Negative.    Neurological: Negative.    Hematological: Negative.    Psychiatric/Behavioral: Negative.           Infusion Readiness:   - Assessment Concerns Related to Infusion: No  - Provider notified: n/a      Document Below Only If Indicated:   New Patient Education:    N/A (returning patient for continuation of therapy. Ongoing education provided as needed.)        Treatment Conditions & Drug Specific Questions:    NOT APPLICABLE        Weight Based Drug Calculations:    WEIGHT BASED DRUGS: NOT APPLICABLE / FLAT DOSE          Initiated By: Adria Lewis RN

## 2024-06-07 NOTE — PATIENT INSTRUCTIONS
Today you received:  VENOFER 200MG INFUSION 3/5      For:    1. Iron deficiency anemia due to chronic blood loss    2. Ulcerative colitis with rectal bleeding, unspecified location (Multi)            Please read the  Medication Guide that was given to you and reviewed during todays visit.     (Tell all doctors including dentists that you are taking this medication)     Go to the emergency room or call 911 if:  -You have signs of allergic reaction:   o         Rash, hives, itching.   o         Swollen, blistered, peeling skin.   o         Swelling of face, lips, mouth, tongue or throat.   o         Tightness of chest, trouble breathing, swallowing or talking      Call your doctor:     - If IV / injection site gets red, warm, swollen, itchy or leaks fluid or pus.     (Leave dressing on your IV site for at least 2 hours and keep area clean and dry    - If you get sick or have symptoms of infection or are not feeling well for any reason.    (Wash your hands often, stay away from people who are sick)    - If you have side effects from your medication that do not go away or are bothersome.     (Refer to the teaching your nurse gave you for side effects to call your doctor about)     Common side effects may include:  stuffy nose, headache, feeling tired, muscle aches, upset stomach    - Before receiving any vaccines, Call the [facility] at [phone]     - You get sick, are on antibiotics, have had a recent vaccine, have surgery or dental work and your doctor wants your visit rescheduled.    - You need to cancel and reschedule your visit for any reason. Call at least 2 days before your visit if you need to cancel.     - Your insurance changes before your next visit.    (We will need to get approval from your new insurance. This can take up to two weeks.)     [facility] is opened Monday thru Friday 8am-8pm and Saturday 8am-4:30pm. We are closed on holidays.     Voice mail will take  your call if the center is closed. If you leave a message please allow 24 hours for a call back during weekdays. If you leave a message on a weekend/holiday, we will call you back the next business day.

## 2024-06-10 ENCOUNTER — INFUSION (OUTPATIENT)
Dept: INFUSION THERAPY | Facility: CLINIC | Age: 24
End: 2024-06-10
Payer: COMMERCIAL

## 2024-06-10 VITALS
DIASTOLIC BLOOD PRESSURE: 62 MMHG | TEMPERATURE: 97.5 F | OXYGEN SATURATION: 99 % | SYSTOLIC BLOOD PRESSURE: 91 MMHG | RESPIRATION RATE: 18 BRPM | HEART RATE: 78 BPM

## 2024-06-10 DIAGNOSIS — D50.0 IRON DEFICIENCY ANEMIA DUE TO CHRONIC BLOOD LOSS: ICD-10-CM

## 2024-06-10 DIAGNOSIS — K51.911 ULCERATIVE COLITIS WITH RECTAL BLEEDING, UNSPECIFIED LOCATION (MULTI): ICD-10-CM

## 2024-06-10 PROCEDURE — 96365 THER/PROPH/DIAG IV INF INIT: CPT | Performed by: REGISTERED NURSE

## 2024-06-10 RX ORDER — DIPHENHYDRAMINE HYDROCHLORIDE 50 MG/ML
50 INJECTION INTRAMUSCULAR; INTRAVENOUS AS NEEDED
Status: CANCELLED | OUTPATIENT
Start: 2024-06-13

## 2024-06-10 RX ORDER — EPINEPHRINE 0.3 MG/.3ML
0.3 INJECTION SUBCUTANEOUS EVERY 5 MIN PRN
Status: CANCELLED | OUTPATIENT
Start: 2024-06-13

## 2024-06-10 RX ORDER — FAMOTIDINE 10 MG/ML
20 INJECTION INTRAVENOUS ONCE AS NEEDED
Status: CANCELLED | OUTPATIENT
Start: 2024-06-13

## 2024-06-10 RX ORDER — ALBUTEROL SULFATE 0.83 MG/ML
3 SOLUTION RESPIRATORY (INHALATION) AS NEEDED
Status: CANCELLED | OUTPATIENT
Start: 2024-06-13

## 2024-06-10 ASSESSMENT — ENCOUNTER SYMPTOMS
GASTROINTESTINAL NEGATIVE: 1
RESPIRATORY NEGATIVE: 1
CARDIOVASCULAR NEGATIVE: 1
MUSCULOSKELETAL NEGATIVE: 1
NEUROLOGICAL NEGATIVE: 1
FATIGUE: 1

## 2024-06-10 ASSESSMENT — PAIN SCALES - GENERAL: PAINLEVEL: 0-NO PAIN

## 2024-06-10 NOTE — PATIENT INSTRUCTIONS
Today :We administered iron sucrose (Venofer) 200 mg in sodium chloride 0.9% 100 mL IV.     For:   1. Iron deficiency anemia due to chronic blood loss    2. Ulcerative colitis with rectal bleeding, unspecified location (Multi)         Your next appointment is due in:  6/13 @ 3 PM        Please read the  Medication Guide that was given to you and reviewed during todays visit.     (Tell all doctors including dentists that you are taking this medication)     Go to the emergency room or call 911 if:  -You have signs of allergic reaction:   -Rash, hives, itching.   -Swollen, blistered, peeling skin.   -Swelling of face, lips, mouth, tongue or throat.   -Tightness of chest, trouble breathing, swallowing or talking     Call your doctor:  - If IV / injection site gets red, warm, swollen, itchy or leaks fluid or pus.     (Leave dressing on your IV site for at least 2 hours and keep area clean and dry  - If you get sick or have symptoms of infection or are not feeling well for any reason.    (Wash your hands often, stay away from people who are sick)  - If you have side effects from your medication that do not go away or are bothersome.     (Refer to the teaching your nurse gave you for side effects to call your doctor about)    - Common side effects may include:  stuffy nose, headache, feeling tired, muscle aches, upset stomach  - Before receiving any vaccines     - Call the [facility] at   If:  - You get sick, are on antibiotics, have had a recent vaccine, have surgery or dental work and your doctor wants your visit rescheduled.  - You need to cancel and reschedule your visit for any reason. Call at least 2 days before your visit if you need to cancel.   - Your insurance changes before your next visit.    (We will need to get approval from your new insurance. This can take up to two weeks.)     [facility] is opened Monday thru Friday. We are closed on weekends and holidays.   Voice mail  will take your call if the center is closed. If you leave a message please allow 24 hours for a call back during weekdays. If you leave a message on a weekend/holiday, we will call you back the next business day.

## 2024-06-10 NOTE — PROGRESS NOTES
[facility]   Infusion Clinic Note   Date: Jenni 10, 2024   Name: Pastora Rawls  : 2000   MRN: [id number]         Reason for Visit: OP Infusion (PATIENT HERE FOR DAY 4/5 VENFOER 300 MG INFUSION)      Accompanied by:Relative   Visit Type:: Infusion   Diagnosis: Iron deficiency anemia due to chronic blood loss    Ulcerative colitis with rectal bleeding, unspecified location (Multi)    Allergies:   Allergies as of 06/10/2024 - Reviewed 06/10/2024   Allergen Reaction Noted    Aspirin Other 2024    Gelatin Itching 10/05/2023    Nsaids (non-steroidal anti-inflammatory drug) Itching 10/05/2023      Current Meds has a current medication list which includes the following prescription(s): ferrous sulfate, venofer, prenat 115/iron fum/folic/dss, prenatal no115/iron/folic acid, and vedolizumab, and the following Facility-Administered Medications: iron sucrose (Venofer) 200 mg in sodium chloride 0.9% 100 mL IV.        Vitals:  Vitals:    06/10/24 1501   BP: 88/54   Pulse: 80   Resp: 18   Temp: 36.4 °C (97.5 °F)   SpO2: 100%      Infusion Pre-procedure Checklist   Allergies reviewed: yes   Medications reviewed: yes   Contraindications to treatment:No   Previous reaction to current treatment:No   Current Health Issues: None   Pain: 0-no pain [0]'    Is the pain different from normal: No   Is the pain tolerable: n/a   Is your Doctor aware: n/a   Contraindications based on patient history: No   Provider notified: Not applicable   Labs: Labs reviewed   Fall Risk Screening: Lowe Fall Risk  History of Falling, Immediate or Within 3 Months: No  Secondary Diagnosis: No  Ambulatory Aid: Walks without aid/bedrest/nurse assist  Intravenous Therapy/Heparin Lock: No  Gait/Transferring: Normal/bedrest/immobile  Mental Status: Oriented to own ability  Lowe Fall Risk Score: 0    Review of Systems   Constitutional:  Positive for fatigue.   HENT:  Negative.     Respiratory: Negative.      Cardiovascular: Negative.    Gastrointestinal: Negative.    Musculoskeletal: Negative.    Skin: Negative.    Neurological: Negative.       Negative for complaint: [] all other systems have been reviewed and are negative for complaint   Infusion Readiness:   Assessment Concerns Related to Infusion: No  Provider notified: n/a  Assess patient for the concerns below. Document provider notification as appropriate:  - Does not meet criteria to treat No  - Has an active or recent infection with/without current antibiotic use No  - Has recent/planned dental work No  - Has recent/planned surgeries No  - Has recently received or plans to receive vaccinations No  - Has treatment related toxicities No  - Is pregnant (unless noted otherwise) No    Initiated By: Mia Jackson RN   Time: 3:06 PM     We administered iron sucrose (Venofer) 200 mg in sodium chloride 0.9% 100 mL IV.        All questions and concerns were addressed at time of visit. Patient was not independently evaluated by the Nurse Practitioner on site at [facility].

## 2024-06-13 ENCOUNTER — TELEPHONE (OUTPATIENT)
Dept: PRIMARY CARE | Facility: CLINIC | Age: 24
End: 2024-06-13

## 2024-06-13 ENCOUNTER — APPOINTMENT (OUTPATIENT)
Dept: INFUSION THERAPY | Facility: CLINIC | Age: 24
End: 2024-06-13
Payer: COMMERCIAL

## 2024-06-13 ENCOUNTER — APPOINTMENT (OUTPATIENT)
Dept: RADIOLOGY | Facility: CLINIC | Age: 24
End: 2024-06-13
Payer: COMMERCIAL

## 2024-06-13 VITALS
HEART RATE: 98 BPM | TEMPERATURE: 98.4 F | DIASTOLIC BLOOD PRESSURE: 66 MMHG | OXYGEN SATURATION: 100 % | RESPIRATION RATE: 16 BRPM | SYSTOLIC BLOOD PRESSURE: 101 MMHG

## 2024-06-13 DIAGNOSIS — D50.0 IRON DEFICIENCY ANEMIA DUE TO CHRONIC BLOOD LOSS: ICD-10-CM

## 2024-06-13 DIAGNOSIS — K51.911 ULCERATIVE COLITIS WITH RECTAL BLEEDING, UNSPECIFIED LOCATION (MULTI): ICD-10-CM

## 2024-06-13 PROCEDURE — 96365 THER/PROPH/DIAG IV INF INIT: CPT | Performed by: REGISTERED NURSE

## 2024-06-13 RX ORDER — ALBUTEROL SULFATE 0.83 MG/ML
3 SOLUTION RESPIRATORY (INHALATION) AS NEEDED
OUTPATIENT
Start: 2024-06-13

## 2024-06-13 RX ORDER — EPINEPHRINE 0.3 MG/.3ML
0.3 INJECTION SUBCUTANEOUS EVERY 5 MIN PRN
OUTPATIENT
Start: 2024-06-13

## 2024-06-13 RX ORDER — FAMOTIDINE 10 MG/ML
20 INJECTION INTRAVENOUS ONCE AS NEEDED
OUTPATIENT
Start: 2024-06-13

## 2024-06-13 RX ORDER — DIPHENHYDRAMINE HYDROCHLORIDE 50 MG/ML
50 INJECTION INTRAMUSCULAR; INTRAVENOUS AS NEEDED
OUTPATIENT
Start: 2024-06-13

## 2024-06-13 NOTE — PATIENT INSTRUCTIONS
Today :We administered iron sucrose (Venofer) 200 mg in sodium chloride 0.9% 110 mL IV.     For:   1. Iron deficiency anemia due to chronic blood loss    2. Ulcerative colitis with rectal bleeding, unspecified location (Multi)         Your next appointment is due in:  Follow up with your provider        Please read the  Medication Guide that was given to you and reviewed during todays visit.     (Tell all doctors including dentists that you are taking this medication)     Go to the emergency room or call 911 if:  -You have signs of allergic reaction:   -Rash, hives, itching.   -Swollen, blistered, peeling skin.   -Swelling of face, lips, mouth, tongue or throat.   -Tightness of chest, trouble breathing, swallowing or talking     Call your doctor:  - If IV / injection site gets red, warm, swollen, itchy or leaks fluid or pus.     (Leave dressing on your IV site for at least 2 hours and keep area clean and dry  - If you get sick or have symptoms of infection or are not feeling well for any reason.    (Wash your hands often, stay away from people who are sick)  - If you have side effects from your medication that do not go away or are bothersome.     (Refer to the teaching your nurse gave you for side effects to call your doctor about)    - Common side effects may include:  stuffy nose, headache, feeling tired, muscle aches, upset stomach  - Before receiving any vaccines     - Call the [facility] at  [phone] If:  - You get sick, are on antibiotics, have had a recent vaccine, have surgery or dental work and your doctor wants your visit rescheduled.  - You need to cancel and reschedule your visit for any reason. Call at least 2 days before your visit if you need to cancel.   - Your insurance changes before your next visit.    (We will need to get approval from your new insurance. This can take up to two weeks.)     [facility] is opened Monday thru Friday, Saturdays. We are  closed on Sundays and holidays.   Voice mail will take your call if the center is closed. If you leave a message please allow 24 hours for a call back during weekdays. If you leave a message on a weekend/holiday, we will call you back the next business day.

## 2024-06-13 NOTE — TELEPHONE ENCOUNTER
Having a reaction to one of her infusions.    Rash on her neck.   Needs to discuss if she should go for her infusion today?

## 2024-06-13 NOTE — PROGRESS NOTES
[id number]   [facility]   Date: 2024   Name: Pastora Rawls  : 2000   MRN: [id number]         Reason for Visit: OP Infusion (Pt here for 5/5 Venofer infusion)      Accompanied by:Self   Visit Type:: Infusion   Diagnosis: Iron deficiency anemia due to chronic blood loss    Ulcerative colitis with rectal bleeding, unspecified location (Multi)    Allergies:   Allergies as of 2024 - Reviewed 2024   Allergen Reaction Noted    Aspirin Other 2024    Gelatin Itching 10/05/2023    Nsaids (non-steroidal anti-inflammatory drug) Itching 10/05/2023      Current Meds has a current medication list which includes the following prescription(s): ferrous sulfate, venofer, prenat 115/iron fum/folic/dss, prenatal no115/iron/folic acid, and vedolizumab.        Vitals:  Vitals:    24 1402   BP: 101/66   Pulse: 98   Resp: 16   Temp: 36.9 °C (98.4 °F)   SpO2: 100%      Infusion Pre-procedure Checklist   Allergies reviewed: yes   Medications reviewed: yes   Contraindications to treatment:No   Previous reaction to current treatment:No   Current Health Issues: None   Pain: '    Is the pain different from normal: No   Is the pain tolerable: n/a   Is your Doctor aware: n/a   Contraindications based on patient history: No   Provider notified: Not applicable   Labs: Labs reviewed   Fall Risk Screening: Chrissy Fall Risk  History of Falling, Immediate or Within 3 Months: No  Secondary Diagnosis: No  Ambulatory Aid: Walks without aid/bedrest/nurse assist  Intravenous Therapy/Heparin Lock: No  Gait/Transferring: Normal/bedrest/immobile  Mental Status: Oriented to own ability  Lowe Fall Risk Score: 0    Review of Systems - Oncology   Negative for complaint: [] all other systems have been reviewed and are negative for complaint   Infusion Readiness:   Assessment Concerns Related to Infusion: No  Provider notified: n/a  Assess patient for the concerns below.  Document provider notification as appropriate:  - Does not meet criteria to treat No  - Has an active or recent infection with/without current antibiotic use No  - Has recent/planned dental work No  - Has recent/planned surgeries No  - Has recently received or plans to receive vaccinations No  - Has treatment related toxicities No  - Is pregnant (unless noted otherwise) No    Initiated By: Meme Fletcher RN   Time: 3:38 PM     We administered iron sucrose (Venofer) 200 mg in sodium chloride 0.9% 110 mL IV.        All questions and concerns were addressed at time of visit. Patient was not independently evaluated by the Nurse Practitioner on site at [facility].

## 2024-06-19 ENCOUNTER — APPOINTMENT (OUTPATIENT)
Dept: PRIMARY CARE | Facility: CLINIC | Age: 24
End: 2024-06-19
Payer: COMMERCIAL

## 2024-06-19 ENCOUNTER — LAB (OUTPATIENT)
Dept: LAB | Facility: LAB | Age: 24
End: 2024-06-19
Payer: COMMERCIAL

## 2024-06-19 VITALS — SYSTOLIC BLOOD PRESSURE: 122 MMHG | WEIGHT: 140 LBS | BODY MASS INDEX: 24.03 KG/M2 | DIASTOLIC BLOOD PRESSURE: 78 MMHG

## 2024-06-19 DIAGNOSIS — D50.0 IRON DEFICIENCY ANEMIA DUE TO CHRONIC BLOOD LOSS: ICD-10-CM

## 2024-06-19 DIAGNOSIS — K51.919 ULCERATIVE COLITIS WITH COMPLICATION, UNSPECIFIED LOCATION (MULTI): ICD-10-CM

## 2024-06-19 DIAGNOSIS — K51.919 ULCERATIVE COLITIS WITH COMPLICATION, UNSPECIFIED LOCATION (MULTI): Primary | ICD-10-CM

## 2024-06-19 LAB
FERRITIN SERPL-MCNC: 259 NG/ML (ref 8–150)
IRON SATN MFR SERPL: 17 % (ref 25–45)
IRON SERPL-MCNC: 37 UG/DL (ref 35–150)
TIBC SERPL-MCNC: 221 UG/DL (ref 240–445)
UIBC SERPL-MCNC: 184 UG/DL (ref 110–370)

## 2024-06-19 PROCEDURE — 85027 COMPLETE CBC AUTOMATED: CPT

## 2024-06-19 PROCEDURE — 36415 COLL VENOUS BLD VENIPUNCTURE: CPT

## 2024-06-19 PROCEDURE — 83540 ASSAY OF IRON: CPT

## 2024-06-19 PROCEDURE — 99214 OFFICE O/P EST MOD 30 MIN: CPT | Performed by: INTERNAL MEDICINE

## 2024-06-19 PROCEDURE — 82728 ASSAY OF FERRITIN: CPT

## 2024-06-19 PROCEDURE — 83550 IRON BINDING TEST: CPT

## 2024-06-19 PROCEDURE — 85007 BL SMEAR W/DIFF WBC COUNT: CPT

## 2024-06-19 ASSESSMENT — ENCOUNTER SYMPTOMS
DIARRHEA: 1
CARDIOVASCULAR NEGATIVE: 1
ABDOMINAL PAIN: 1
NEUROLOGICAL NEGATIVE: 1
HEMATOCHEZIA: 1
RESPIRATORY NEGATIVE: 1
MUSCULOSKELETAL NEGATIVE: 1
CONSTITUTIONAL NEGATIVE: 1

## 2024-06-19 NOTE — PROGRESS NOTES
"Subjective   Pastora Rawls is a 23 y.o. female with a PMH of ulcerative colitis and iron deficiency anemia here for a follow up visit. She states that the Entyvio has helped with decreasing her pain and decreasing the amount of bowel movements that she has. Yesterday had a little bit of blood in her bm, however has not had any bloody bms since starting Entyvio otherwise. She states that with the increase in iron infusions and Entyvio, she started to develop a darker colored rash on her neck, however it since resolved. Denies any redness, pain, or itchiness of the rash, and denies signs of anaphylaxis.    Chief Complaint:  Follow-up    Review of Systems   Constitutional: Negative.   HENT: Negative.     Cardiovascular: Negative.    Respiratory: Negative.     Musculoskeletal: Negative.    Gastrointestinal:  Positive for abdominal pain, diarrhea and hematochezia.        All of the mentioned GI symptoms are improving with Entyvio   Genitourinary: Negative.    Neurological: Negative.        Objective   Vitals reviewed.   Constitutional:       Appearance: Healthy appearance. Not in distress.   Pulmonary:      Effort: Pulmonary effort is normal.      Breath sounds: Normal breath sounds. No wheezing. No rhonchi.   Cardiovascular:      Normal rate. Regular rhythm.      Murmurs: There is no murmur.   Edema:     Peripheral edema absent.   Abdominal:      General: There is no distension.      Palpations: Abdomen is soft.      Tenderness: There is no abdominal tenderness.   Skin:     Findings: No rash.   Neurological:      General: No focal deficit present.      Mental Status: Alert and oriented to person, place and time.         Lab Review:   Lab Results   Component Value Date     02/09/2024    K 3.9 02/09/2024     (H) 02/09/2024    CO2 25 02/09/2024    BUN 13 02/09/2024    CREATININE 0.53 02/09/2024    GLUCOSE 98 02/09/2024    CALCIUM 8.8 02/09/2024     No results found for: \"CKTOTAL\", \"CKMB\", \"CKMBINDEX\", " "\"TROPONINI\"  Lab Results   Component Value Date    WBC 5.7 05/22/2024    HGB 8.8 (L) 05/22/2024    HCT 30.4 (L) 05/22/2024    MCV 76 (L) 05/22/2024     (H) 05/22/2024     Lab Results   Component Value Date    CHOL 81 02/09/2024    TRIG 50 02/09/2024    HDL 62.0 02/09/2024       Assessment/Plan   The primary encounter diagnosis was Ulcerative colitis with complication, unspecified location (Multi). A diagnosis of Iron deficiency anemia due to chronic blood loss was also pertinent to this visit.    #Follow up visit  #UC  #Iron deficiency anemia  - Patient reports that since starting Entyvio, she's been having significant symptomatic improvement  - She has been complaint with Venofer every 3 days  - Will get repeat CBC and diff, Iron and TIBC, and ferritin levels this visit  - Patient is vegan, instructed her to eat vegan foods high in iron such as spinach and different kinds of beans and seeds.   - If iron improves, will trial a period in which she does not need Venofer infusions  - Continue Entyvio as prescribed  - Continue to follow up with GI  - In terms of the rash, it self resolved, likely due to a mild reaction from either the Entyvio or Venofer. Continue to monitor at home for signs of anaphylaxis.  - Follow up in 1 year or before if needed  "

## 2024-06-19 NOTE — PROGRESS NOTES
I reviewed the resident/fellow's documentation and discussed the patient with the resident/fellow. I agree with the resident/fellow's medical decision making as documented in the note.  As the attending physician, I certify that I personally reviewed the patient's history and personally examined the patient to confirm the physical findings described above, and that I reviewed the relevant imaging studies and available reports. I also discussed the differential diagnosis and all of the proposed management plans with the patient and individuals accompanying the patient to this visit. They had the opportunity to ask questions about the proposed management plans and to have those questions answered.     Bonnie Lockhart MD

## 2024-06-20 LAB
BASOPHILS # BLD MANUAL: 0 X10*3/UL (ref 0–0.1)
BASOPHILS NFR BLD MANUAL: 0 %
EOSINOPHIL # BLD MANUAL: 0.51 X10*3/UL (ref 0–0.7)
EOSINOPHIL NFR BLD MANUAL: 8.6 %
ERYTHROCYTE [DISTWIDTH] IN BLOOD BY AUTOMATED COUNT: 22.8 % (ref 11.5–14.5)
HCT VFR BLD AUTO: 32.4 % (ref 36–46)
HGB BLD-MCNC: 9.4 G/DL (ref 12–16)
HYPOCHROMIA BLD QL SMEAR: NORMAL
IMM GRANULOCYTES # BLD AUTO: 0.01 X10*3/UL (ref 0–0.7)
IMM GRANULOCYTES NFR BLD AUTO: 0.2 % (ref 0–0.9)
LYMPHOCYTES # BLD MANUAL: 1.94 X10*3/UL (ref 1.2–4.8)
LYMPHOCYTES NFR BLD MANUAL: 32.8 %
MCH RBC QN AUTO: 24.2 PG (ref 26–34)
MCHC RBC AUTO-ENTMCNC: 29 G/DL (ref 32–36)
MCV RBC AUTO: 84 FL (ref 80–100)
MONOCYTES # BLD MANUAL: 0.21 X10*3/UL (ref 0.1–1)
MONOCYTES NFR BLD MANUAL: 3.5 %
NEUTROPHILS # BLD MANUAL: 3.25 X10*3/UL (ref 1.2–7.7)
NEUTS BAND # BLD MANUAL: 0.35 X10*3/UL (ref 0–0.7)
NEUTS BAND NFR BLD MANUAL: 6 %
NEUTS SEG # BLD MANUAL: 2.9 X10*3/UL (ref 1.2–7)
NEUTS SEG NFR BLD MANUAL: 49.1 %
NRBC BLD-RTO: 0 /100 WBCS (ref 0–0)
OVALOCYTES BLD QL SMEAR: NORMAL
PLATELET # BLD AUTO: 428 X10*3/UL (ref 150–450)
RBC # BLD AUTO: 3.88 X10*6/UL (ref 4–5.2)
RBC MORPH BLD: NORMAL
TOTAL CELLS COUNTED BLD: 116
WBC # BLD AUTO: 5.9 X10*3/UL (ref 4.4–11.3)

## 2024-06-20 NOTE — RESULT ENCOUNTER NOTE
Doug Guillory,  Your hemoglobin levels are much better.  We can stop infusions and continue with oral iron supplements and iron rich foods.  We will recheck in 3 months.  Dr. Nicole

## 2024-06-24 ENCOUNTER — APPOINTMENT (OUTPATIENT)
Dept: GENETICS | Facility: CLINIC | Age: 24
End: 2024-06-24
Payer: COMMERCIAL

## 2024-06-24 DIAGNOSIS — Z13.71 ENCOUNTER FOR NONPROCREATIVE GENETIC COUNSELING AND TESTING: Primary | ICD-10-CM

## 2024-06-24 DIAGNOSIS — Z80.3 FAMILY HISTORY OF BREAST CANCER: ICD-10-CM

## 2024-06-24 DIAGNOSIS — Z80.41 FAMILY HISTORY OF OVARIAN CANCER: ICD-10-CM

## 2024-06-24 DIAGNOSIS — Z71.83 ENCOUNTER FOR NONPROCREATIVE GENETIC COUNSELING AND TESTING: Primary | ICD-10-CM

## 2024-06-24 DIAGNOSIS — Z84.81 FAMILY HISTORY OF BRCA GENE MUTATION: ICD-10-CM

## 2024-06-24 PROCEDURE — 98968 PH1 ASSMT&MGMT NQHP 21-30: CPT | Performed by: GENETIC COUNSELOR, MS

## 2024-06-24 NOTE — PROGRESS NOTES
History of Present Illness:  Pastora Rawls is a 23 y.o. female with a family history of breast cancer, as well as a reported BRCA mutation in a maternal great aunt.  She was referred to the Cancer Genetics Clinic at [facility] by Jia Art, APR*. Ms. Rawls is interested in genetic testing to clarify their personal risk for cancer, as well as the risks to their family members.    Cancer Medical History:  Personal history of cancer? No.    Prior hereditary cancer (germline) genetic testing? No.    Other health issues: anemia, IBD (ulcerative colitis), history of spinal fusion surgery at age 13.    Cancer screening history:  Mammograms? No.  Breast MRI? No.  Breast biopsy? No,  PAP smear? Yes, last 2024. Negative.  Colonoscopy? Yes, multiple due to IBD. Last 2024. Last showed a pseudopolyp.   Upper endoscopy? Yes, last ???  Dermatology? No.  Other cancer screening? No.    Reproductive History:  Number of children: 0.  Number of pregnancies: 1.  Menarche (age): 10.  Menopause (age): N/A.  OCP: No.  HRT: N/A.    Hysterectomy? No.  Oophorectomy? No.    Family history:  A 4-generation pedigree was obtained and was significant for the following:  -Patient, no history of cancer;  -Father, living, but paternal family history unknown;  -Mother, breast cancer at 35,  at 39;  -Maternal grandmother, no history of cancer, alive 81;  -Maternal great aunt (sister to SIRISHA), breast cancer at 50, reportedly BRCA positive genetic test report not available for review,  at 60;  -Maternal first cousins once removed, children of great aunt above, all reportedly BRCA positive;  -Maternal great grandmother (M's mother, colon/breast/lung cancer in late 70s/early 80s,  at 85;  -Maternal half great sister (sister to SIRISHA), breast cancer at unknown age, ;  -Multiple MGGM's siblings with cancer delineated below:  -MGGM's sister #1, ovarian cancer,  in her 60s;  -MGGM's sister #2, breast  cancer at 70, still living in her 70s;  -MGGM's sister #3, breast cancer 60s, then ovarian cancer,  in her 60s/70s;  -MGGM's sister #4, breast cancer in 40s, alive at 92;  -Four of four daughters of the MGGM's sisters also had breast cancer (one  in her 30s, one diagnosed in their 50s, and one diagnosed at 61)   -Daughter of one of the 4 daughters was diagnosed with breast cancer at 30, and is alive in her 50s;  -Multiple (five or six) brothers of MGGM with prostate cancer ().     Maternal ancestry is -American.  Paternal ancestry is -American. There is no known Ashkenazi Restorationism ancestry. Her maternal grandparents are distant cousins.    Genetic counseling:  Ms. Rawls is a 23 y.o. female with a maternal family history of breast and ovarian cancer, including early-onset breast breast cancer in her mother who is .  She also has reported family history of a BRCA mutation in a maternal great aunt and several maternal first cousins once removed.  Her family history is very concerning for hereditary breast cancer, given the reported BRCA mutation family, this could be BRCA1 or BRCA2-related hereditary breast and ovarian cancer (HBOC), or hereditary breast cancer due to a different gene mutation, such as PALB2.  Ms. Rawls meets current national (NCCN) criteria for testing of high-penetrance breast cancer susceptibility genes, including BRCA1, BRCA2, CDH1, PALB2, PTEN, and TP53.  Testing is medically necessary, as it will help determine if Ms. Rawls is a candidate for (high-risk breast care risk and future risk-reducing BSO (having the ovaries and fallopian tubes removed to prevent ovarian cancer).    We reviewed genes and chromosomes, inherited forms of breast and ovarian cancer, and the BRCA1 and BRCA2 genes causing HBOC.  We discussed that most cancers are not due to an inherited genetic susceptibility.  However, in about 5-10% of families, there is an inherited genetic mutation  that can make a person more susceptible to developing certain forms of cancer.  Within these families, we often see multiple family members with cancer, occurring in multiple generations.  In addition, earlier onset and bilateral cancers are suggestive of an inherited form of cancer.  Finally, there is a clustering of certain types of cancer in these families, such as breast and ovarian cancer..    We discussed the BRCA1 and BRCA2 genes, which are two genes that have been linked to early-onset breast and/or ovarian cancer.  Mutations in these genes are inherited in a dominant pattern and confer up to an 87% lifetime risk for breast cancer.  This is elevated compared to the general population risk of 10-12%.  In addition, BRCA1 and BRCA2 mutation carriers have up to a 45% lifetime risk for ovarian cancer, which is elevated over the 2% general population risk.  Mutation carriers who have already been diagnosed with cancer have an increased risk to develop a second, contralateral breast cancer.  BRCA2 gene mutation carriers have an increased risk for male breast cancer, prostate cancer, melanoma, gastric cancer, and pancreatic cancer.    We discussed that there are multiple genes associated with increased breast and/or ovarian cancer risk. Some genes, like the BRCA genes are considered highly penetrant breast and ovarian cancer genes, meaning a mutation in the gene confers a high risk of breast and/or ovarian cancer. On the other hand, there are other intermediate (moderate risk) breast and ovarian cancer genes. For many of the moderate risk genes, there is sometimes limited information regarding the degree to which a mutation in the gene affects risk of different types of cancers. Additionally, for some of these moderate risk genes, the appropriate management for individuals who have a mutation in one of these genes is not always clear. In many cases, even if an individual tests positive for a mutation in a moderate  risk gene, recommendations are still based on the family history, not the positive test result.     was counseled about hereditary cancer susceptibility including cancer risks, options for increased screening and/or risk reduction, genetic testing (including positive, negative, and uncertain results), and the implications for other family members.  We discussed performing testing for high-penetrance breast cancer susceptibility genes, ideally as part of a multi-gene panel.  We specifically discussed the 48-gene [facility] Common Hereditary Cancers panel, which examines the following genes:  APC, ELYSE, AXIN2, BAP1, BARD1, BMPR1A, BRCA1, BRCA2, BRIP1, CDH1, CDK4, CDKN2A, CHEK2, CTNNA1, DICER1, EPCAM, FH, GREM1, HOXB13, KIT, MBD4, MEN1, MLH1, MSH2, MSH3, MSH6, MUTYH, NF1, NTHL1, PALB2, PDGFRA, PMS2, POLD1, POLE, PTEN, RAD51C, RAD51D, SDHA, SDHB, SDHC, SDHD, SMAD4, SMARCA4, STK11, TP53, TSC1, TSC2, VHL.    We discussed the Genetic Information Nondiscrimination Act (TIFFANIE). We discussed the protections and limitations of TIFFANIE. TIFFANIE generally makes in illegal for health insurance companies, group health plans, and most employers (with >15 employees) to discriminate based on genetic information. It does not protect against genetic discrimination for life insurance, disability insurance, long-term care, or other insurances.    After a discussion about the risks, benefits, and limitations of genetic testing,  Ms. Rawls elected to undergo genetic testing for hereditary cancer using the [facility] panel described above. Oral consent for testing was obtained. An [facility] DNA/RNA blood test kit will be sent to Ms. Rawls's home. They will then bring the test kit to a  outpatient blood draw lab to have their blood drawn for testing (using the test tubes provided in the kit). The sample will then be sent to [facility] for analysis. Results are typically available in 3 weeks from the time of blood draw. Ms. Rawls will return to  the Cancer Genetics Clinic to discuss their testing results.  At that time, we will make recommendations for both Ms. Rawls and their family members in terms of cancer screening and/or cancer risk reduction options.    PLAN:  1. Ms. Rawls elected to undergo genetic testing for hereditary cancer using the [facility] jacqueline described above.  Oral consent for testing was obtained. An [facility] DNA/RNA blood test kit will be sent to Ms. Rawls's home. They will then bring the test kit to a  outpatient blood draw lab to have their blood drawn for testing (using the test tubes provided in the kit). The sample will then be sent to [facility] for analysis.    2.  Results generally return in <= 3 weeks from the time of blood draw.  Ms. Rawls will return to the Cancer Genetics Clinic to discuss their test results.  A follow up appointment has been scheduled for Wednesday, 7/24/2024 at 2:15 PM, via virtual visit.    3. We remain available to Ms. Rawls at [phone] if any questions arise regarding information discussed at today's visit. Leni can be reached directly at [phone].    Leni Kaufman MS, [facility]  Genetic Counselor  [facility]  [phone]    Reviewed by:  Piper Hunt MD  Clinical   [facility]  [phone]    Time spent with patient:  61 minutes (9:04-10:05 am), virtual (phone) visit.    Virtual or Telephone Consent    A telephone visit (audio only) between the patient (at the originating site) and the provider (at the distant site) was utilized to provide this telehealth service.   Verbal consent was requested and obtained from Pastora Rawls on this date, 06/24/24 for a telehealth visit.

## 2024-06-28 ENCOUNTER — LAB (OUTPATIENT)
Dept: LAB | Facility: LAB | Age: 24
End: 2024-06-28
Payer: COMMERCIAL

## 2024-06-28 DIAGNOSIS — Z80.41 FAMILY HISTORY OF OVARIAN CANCER: ICD-10-CM

## 2024-06-28 DIAGNOSIS — Z80.3 FAMILY HISTORY OF BREAST CANCER: ICD-10-CM

## 2024-06-28 DIAGNOSIS — Z84.81 FAMILY HISTORY OF BRCA GENE MUTATION: ICD-10-CM

## 2024-07-02 ENCOUNTER — APPOINTMENT (OUTPATIENT)
Dept: RADIOLOGY | Facility: CLINIC | Age: 24
End: 2024-07-02
Payer: COMMERCIAL

## 2024-07-13 LAB — SCAN RESULT: NORMAL

## 2024-07-15 ENCOUNTER — APPOINTMENT (OUTPATIENT)
Dept: GENETICS | Facility: CLINIC | Age: 24
End: 2024-07-15
Payer: COMMERCIAL

## 2024-07-15 DIAGNOSIS — Z80.41 FAMILY HISTORY OF OVARIAN CANCER: ICD-10-CM

## 2024-07-15 DIAGNOSIS — Z80.3 FAMILY HISTORY OF BREAST CANCER: ICD-10-CM

## 2024-07-15 DIAGNOSIS — Z13.71 ENCOUNTER FOR NONPROCREATIVE GENETIC COUNSELING AND TESTING: Primary | ICD-10-CM

## 2024-07-15 DIAGNOSIS — Z15.09 BRCA1 POSITIVE: ICD-10-CM

## 2024-07-15 DIAGNOSIS — Z15.01 BRCA1 POSITIVE: ICD-10-CM

## 2024-07-15 DIAGNOSIS — Z71.83 ENCOUNTER FOR NONPROCREATIVE GENETIC COUNSELING AND TESTING: Primary | ICD-10-CM

## 2024-07-15 DIAGNOSIS — Z84.81 FAMILY HISTORY OF BRCA GENE MUTATION: ICD-10-CM

## 2024-07-15 PROCEDURE — 96040 PR MEDICAL GENETICS COUNSELING EACH 30 MINUTES: CPT | Performed by: GENETIC COUNSELOR, MS

## 2024-07-15 NOTE — LETTER
2024    Pastora Rawls   [address] [facility] [address]      Dear Family Member,    A member of your family, Pastora Rawls ( 2000), was seen at the [facility] at [facility] to discuss her genetic test results.  She underwent genetic testing for hereditary cancer because of the family history of cancer, including her mother's history of breast cancer and a reported family history of a BRCA1 mutation.    Genetic testing through [employer] ([id number]) demonstrated that a genetic alteration (mutation) was present in a gene known as BRCA1.  The specific gene mutation in BRCA1 found in your relative is called BRCA1 Gain (Exons 17-18).     Because BRCA1 mutations are passed through families, Pastora's relatives may have a higher chance to get cancer. The BRCA1 mutation most likely came from her mother's side of the family, given what we know about the family history.  Further testing of other family members may help determine this.  Given the family history, family members may benefit from testing for more than just the BRCA1 mutation Pastora was found to have.    We know that cancer is a relatively common disease in the general population.  However, in some families with cancer, the cancer is due to an alteration in a gene that can be passed down from one generation to the next.  In your family, some of the family history of cancer appears to be due to a BRCA1 mutation.    Because a BRCA1 mutation has been identified in your relative, you may have also inherited this gene change.  Each sibling and child of someone with a known BRCA1 mutation has a 50% chance of inheriting that same genetic change.  The parents of a person with a known BRCA1 mutation also have a 50% chance to have the same mutation.  Inheriting an alteration (mutation) in the BRCA1 gene does not mean that an individual will develop cancer.  Rather, inheriting a BRCA1 mutation places a  person at a higher chance of getting cancer compared to someone who does not have this mutation.    Changes in the BRCA1 gene are known to increase the risk for certain types of cancers.  These include an increased risk for breast cancer and ovarian cancer.  Women with a BRCA1 mutation have a significantly higher lifetime risk for developing a first breast cancer and for developing ovarian cancer compared to the average woman.  Women with a BRCA1 mutation are also at increased risk to develop a second breast cancer.    While men carrying a BRCA1 mutation do not face the same risks of developing cancer as women, they do have an increased chance of developing prostate cancer and male breast cancer, compared to a man who   does not carry one of these mutations.  Additionally, men who carry a BRCA1 mutation have 50% chance of   passing on this mutation to each of their children, including any daughters they have.  Men and women who carry a BRCA1 mutation also have a slight increased risk for pancreatic cancer.    There are a number of options available to individuals with a BRCA1 mutation to manage their cancer risks.  These include frequent cancer screening, preventive surgeries, and certain medications which can lower the risk for cancer.  Before considering which option is most appropriate, we encourage individuals to meet with a medical geneticist and/or genetic counselor to discuss the option of genetic testing, and whether additional options are necessary.    Also, individuals who carry a BRCA1 mutation are carriers for a different genetic condition called Fanconi anemia (FA). FA affects many parts of the body. People with FA may have bone marrow failure, physical abnormalities, organ defects, and an increased risk of certain cancers. FA is due to mutations in a number of different genes. When associated with mutations in the BRCA1 gene, it occurs when a person inherits one BRCA1 mutation from their father, and  one BRCA1 mutation from their mother.  This information could have reproductive implications for family members who are BRCA1 mutation carriers.    If you live in the [facility] and would like to pursue testing, we would be glad to see you here at . To be seen at , you can call [phone], option 1 to schedule an appointment for genetic counseling.  If you live outside of the [facility], we encourage you to share this information with your personal physician who could direct you to a genetics specialist in your area. You may also find a genetics specialist by visiting the National Society for Genetic Counselors website at: http://www.nsgc.org/ under “Find a Genetic Counselor,” with “Cancer” specified under special area. You will need to bring a copy of your relative's test report (it says “[facility]” at the top) with you to your appointment.    Additionally, you can call me at the [facility] at [phone] to address any general questions and/or to provide you with the name of a genetics professional in your area.    Sincerely,    Leni Kaufman MS, [facility]  Licensed Genetic Counselor  [facility]  [phone] (direct)

## 2024-07-24 ENCOUNTER — APPOINTMENT (OUTPATIENT)
Dept: GENETICS | Facility: CLINIC | Age: 24
End: 2024-07-24
Payer: COMMERCIAL

## 2024-07-30 ENCOUNTER — HOSPITAL ENCOUNTER (OUTPATIENT)
Dept: RADIOLOGY | Facility: CLINIC | Age: 24
End: 2024-07-30
Payer: COMMERCIAL

## 2024-07-31 ENCOUNTER — APPOINTMENT (OUTPATIENT)
Dept: INFUSION THERAPY | Facility: CLINIC | Age: 24
End: 2024-07-31
Payer: COMMERCIAL

## 2024-07-31 VITALS
SYSTOLIC BLOOD PRESSURE: 102 MMHG | DIASTOLIC BLOOD PRESSURE: 69 MMHG | OXYGEN SATURATION: 99 % | RESPIRATION RATE: 16 BRPM | BODY MASS INDEX: 24.05 KG/M2 | WEIGHT: 140.1 LBS | TEMPERATURE: 97.6 F | HEART RATE: 70 BPM

## 2024-07-31 DIAGNOSIS — K51.219 ULCERATIVE PROCTITIS WITH COMPLICATION (MULTI): ICD-10-CM

## 2024-07-31 PROCEDURE — 96365 THER/PROPH/DIAG IV INF INIT: CPT | Performed by: NURSE PRACTITIONER

## 2024-07-31 RX ORDER — DIPHENHYDRAMINE HYDROCHLORIDE 50 MG/ML
50 INJECTION INTRAMUSCULAR; INTRAVENOUS AS NEEDED
OUTPATIENT
Start: 2024-09-25

## 2024-07-31 RX ORDER — FAMOTIDINE 10 MG/ML
20 INJECTION INTRAVENOUS ONCE AS NEEDED
OUTPATIENT
Start: 2024-09-25

## 2024-07-31 RX ORDER — ALBUTEROL SULFATE 0.83 MG/ML
3 SOLUTION RESPIRATORY (INHALATION) AS NEEDED
OUTPATIENT
Start: 2024-09-25

## 2024-07-31 RX ORDER — EPINEPHRINE 0.3 MG/.3ML
0.3 INJECTION SUBCUTANEOUS EVERY 5 MIN PRN
OUTPATIENT
Start: 2024-09-25

## 2024-07-31 ASSESSMENT — ENCOUNTER SYMPTOMS
UNEXPECTED WEIGHT CHANGE: 0
HEADACHES: 0
WHEEZING: 0
NERVOUS/ANXIOUS: 0
WOUND: 0
CHILLS: 0
DIFFICULTY URINATING: 0
LEG SWELLING: 0
VOMITING: 0
FEVER: 0
CONSTIPATION: 0
APPETITE CHANGE: 0
ARTHRALGIAS: 0
PALPITATIONS: 0
NUMBNESS: 0
DIARRHEA: 0
FATIGUE: 0
EYE PROBLEMS: 0
SORE THROAT: 0
DIZZINESS: 0
LIGHT-HEADEDNESS: 0
SHORTNESS OF BREATH: 0
COUGH: 0
MYALGIAS: 0
TROUBLE SWALLOWING: 0
BLOOD IN STOOL: 0
DEPRESSION: 0
FREQUENCY: 0
NAUSEA: 0
ABDOMINAL PAIN: 0

## 2024-07-31 NOTE — PROGRESS NOTES
[facility]   Infusion Clinic Note   Date: 2024   Name: Pastora Rawls  : 2000   MRN: [id number]         Reason for Visit: OP Infusion (PT HERE FOR Q 56 DAY ENTYVIO 300 MG INFUSION)         Today: We administered vedolizumab (Entyvio) 300 mg in sodium chloride 0.9% 255 mL IV.       Visit Type: INFUSION       Ordered By: DR. MCKEON       Accompanied by:      Diagnosis: Ulcerative proctitis with complication (Multi)       Allergies:   Allergies as of 2024 - Reviewed 2024   Allergen Reaction Noted    Aspirin Other 2024    Gelatin Itching 10/05/2023    Nsaids (non-steroidal anti-inflammatory drug) Itching 10/05/2023         Current Medications has a current medication list which includes the following prescription(s): ferrous sulfate, venofer, prenat 115/iron fum/folic/dss, prenatal no115/iron/folic acid, and vedolizumab.       Vitals:   Vitals:    24 1310 24 1355   BP: 99/61  Comment: nurse notified 102/69   Pulse: 91 70   Resp: 16 16   Temp: 36.4 °C (97.6 °F) 36.4 °C (97.6 °F)   SpO2: 99% 99%   Weight: 63.6 kg (140 lb 1.6 oz)              Infusion Pre-procedure Checklist:   - Allergies reviewed: yes   - Medications reviewed: yes       - Previous reaction to current treatment: no      Assess patient for the concerns below. Document provider notification as appropriate.  - Active or recent infection with/without current antibiotic use: no  - Recent or planned invasive dental work: no  - Recent or planned surgeries: no  - Recently received or plans to receive vaccinations: no  - Has treatment related toxicities: n/a  - Is pregnant:  no      Pain: 0   - Is the pain different from normal: n/a   - Is the pain tolerable: n/a   - Is your Doctor aware:  n/a      Labs: N/A         Fall Risk Screening: Chrissy Fall Risk  History of Falling, Immediate or Within 3 Months: No  Secondary Diagnosis: No  Ambulatory Aid: Walks without aid/bedrest/nurse  assist  Intravenous Therapy/Heparin Lock: Yes  Gait/Transferring: Normal/bedrest/immobile  Mental Status: Oriented to own ability  Lowe Fall Risk Score: 20       Review Of Systems:  Review of Systems   Constitutional:  Negative for appetite change, chills, fatigue, fever and unexpected weight change.   HENT:   Negative for hearing loss, mouth sores, nosebleeds, sore throat, tinnitus and trouble swallowing.    Eyes:  Negative for eye problems.   Respiratory:  Negative for cough, shortness of breath and wheezing.    Cardiovascular:  Negative for chest pain, leg swelling and palpitations.   Gastrointestinal:  Negative for abdominal pain, blood in stool, constipation, diarrhea, nausea and vomiting.        PT ADMITS TO ABOUT 2 BMS PER DAY THAT ARE FORMED IN CONSISTENCY.  DENIES BLOOD, MUCOUS OR PAIN WITH BMS.   ADMITS TO ALWAYS HAVING NOCTURNAL STOOLS.    Genitourinary:  Negative for bladder incontinence, difficulty urinating and frequency.    Musculoskeletal:  Negative for arthralgias, gait problem and myalgias.   Skin:  Negative for itching, rash and wound.   Neurological:  Negative for dizziness, gait problem, headaches, light-headedness and numbness.   Psychiatric/Behavioral:  Negative for depression. The patient is not nervous/anxious.          Infusion Readiness:   - Assessment Concerns Related to Infusion: No  - Provider notified: n/a      Document Below Only If Indicated:   New Patient Education:    N/A (returning patient for continuation of therapy. Ongoing education provided as needed.)        Treatment Conditions & Drug Specific Questions:    Vedolizumab  (ENTYVIO)    (Unless otherwise specified on patient specific therapy plan):     TREATMENT CONDITIONS:  Unless otherwise specified on patient specific therapy plan HOLD and notify provider prior to proceeding with treatment if:   o Positive Hepatitis B Surface Ag  o Positive T-Spot  o Patient has signs or symptoms of Progressive Multifocal Leukoencephalopath  "(PML)     Lab Results   Component Value Date    HEPBSAG Nonreactive 02/20/2024      No results found for: \"NONUHFIRE\", \"NONUHSWGH\", \"NONUHFISH\", \"EXTHEPBSAG\"  Lab Results   Component Value Date    TBSIN Negative 02/09/2024        Labs reviewed and patient meets treatment conditions? Yes    DRUG SPECIFIC QUESTIONS:  Any signs or symptoms of Progressive Multifocal Leukoencephalopath (PML) which may include: memory loss, trouble thinking, loss of balance, difficulty talking or walking, loss of vision?  No    REMINDERS:  Recommended Vitals/Observation:  Vitals: Monitor vitals at start of infusion, at 15 minutes and at completion of infusion.  Observation: First 3 infusions patient may leave 15 minutes post infusion. Subsequent maintenance infusions: if no reaction, may leave immediately post infusion.        Weight Based Drug Calculations:    WEIGHT BASED DRUGS: NOT APPLICABLE / FLAT DOSE          Initiated By: Coral Weber RN        "

## 2024-07-31 NOTE — PATIENT INSTRUCTIONS
Today :We administered vedolizumab (Entyvio) 300 mg in sodium chloride 0.9% 255 mL IV.     For:   1. Ulcerative proctitis with complication (Multi)         Your next appointment is due in:  56 DAYS         Please read the  Medication Guide that was given to you and reviewed during todays visit.     (Tell all doctors including dentists that you are taking this medication)     Go to the emergency room or call 911 if:  -You have signs of allergic reaction:   -Rash, hives, itching.   -Swollen, blistered, peeling skin.   -Swelling of face, lips, mouth, tongue or throat.   -Tightness of chest, trouble breathing, swallowing or talking     Call your doctor:  - If IV / injection site gets red, warm, swollen, itchy or leaks fluid or pus.     (Leave dressing on your IV site for at least 2 hours and keep area clean and dry  - If you get sick or have symptoms of infection or are not feeling well for any reason.    (Wash your hands often, stay away from people who are sick)  - If you have side effects from your medication that do not go away or are bothersome.     (Refer to the teaching your nurse gave you for side effects to call your doctor about)    - Common side effects may include:  stuffy nose, headache, feeling tired, muscle aches, upset stomach  - Before receiving any vaccines     - Call the [facility] at   If:  - You get sick, are on antibiotics, have had a recent vaccine, have surgery or dental work and your doctor wants your visit rescheduled.  - You need to cancel and reschedule your visit for any reason. Call at least 2 days before your visit if you need to cancel.   - Your insurance changes before your next visit.    (We will need to get approval from your new insurance. This can take up to two weeks.)     The [facility] is opened Monday thru Friday. We are closed on weekends and holidays.   Voice mail will take your call if the center is closed. If you leave a message please allow  24 hours for a call back during weekdays. If you leave a message on a weekend/holiday, we will call you back the next business day.

## 2024-08-05 ENCOUNTER — APPOINTMENT (OUTPATIENT)
Dept: PRIMARY CARE | Facility: CLINIC | Age: 24
End: 2024-08-05
Payer: COMMERCIAL

## 2024-08-05 DIAGNOSIS — D50.0 IRON DEFICIENCY ANEMIA DUE TO CHRONIC BLOOD LOSS: Primary | ICD-10-CM

## 2024-08-05 PROCEDURE — 99214 OFFICE O/P EST MOD 30 MIN: CPT | Performed by: INTERNAL MEDICINE

## 2024-08-05 NOTE — PROGRESS NOTES
Chief Complaint:   Subjective     HPI    Pastora Rawls 23 y.o. female with a PMH significant of Ulcerative colitis on Entyvio 300 mg IV weekly, iron deficiency anemia secondary to chronic blood loss on short-term therapy of iron infusion and most recently BRCA1 BRCA2 positive presents for clinic as a virtual patient for follow-up.  Overall doing well and in good spirits.  She is taking the news of her recent genetics study well and is optimistic.  She is taking the notes 1 day at a time.  She has a follow-up appointment with oncology this month.  She continues to complain of generalized fatigue and she tells me she can tell her iron reserves are getting depleted.  Otherwise no new concerns or complaints at this time ROS otherwise negative.    No acute concerns or new complaints. She denies any recent illness, fever, chills, sweats, chest pain, SOB, cough, palpitations, N/V, abdominal pain, diarrhea, melena, hematochezia, or urinary symptoms. ROS otherwise negative.     PMH: Pastora Rawls has a  has a past medical history of Anemia, History of lumbar fusion, Personal history of other diseases of the respiratory system, Ulcerative colitis (Multi), and Ulcerative colitis (Multi).    She has no past medical history of Abnormal Pap smear of cervix, Chlamydia, Congenital heart disease (HHS-HCC), CTS (carpal tunnel syndrome), Depression, Disease of thyroid gland, Endometriosis, Epilepsy (Multi), Female infertility, Fibroid, Gestational diabetes (HHS-HCC), Gonorrhea, Hepatitis B, Hepatitis C, Herpes, HIV disease (Multi), HPV (human papilloma virus) infection, Hyperthyroidism, Hypothyroidism, Kidney stone, Lupus (Multi), Migraine, Ovarian cancer (Multi), PID (pelvic inflammatory disease), Polycystic ovary syndrome, Pulmonary embolism (Multi), Pyelonephritis, Recurrent pregnancy loss, antepartum condition or complication (HHS-HCC), Rh incompatibility, Rheumatoid arthritis (Multi), Sickle cell anemia  (Multi), Syphilis, Tuberculosis, Urinary tract infection, Urogenital trichomoniasis, Urolithiasis, Uterine cancer (Multi), or Varicella.     PSH: Pastora Rawls has a  has a past surgical history that includes Tonsillectomy (06/11/2014) and Back surgery (09/17/2014).     Meds:   Current Outpatient Medications on File Prior to Visit   Medication Sig Dispense Refill    ferrous sulfate 300 mg (60 mg iron)/5 mL syrup Take 5 mL (60 mg of iron) by mouth once daily. 150 mL 11    iron sucrose (Venofer) 200 mg iron/10 mL injection Infuse 10 mL (200 mg) over 5 minutes into a venous catheter 1 (one) time per week. 10 mL 2    PRENAT 115-IRON FUM-FOLIC-DSS ORAL Take by mouth.      PRENATAL -IRON-FOLIC ACID ORAL Take by mouth.      vedolizumab (Entyvio) 300 mg injection Infuse 300 mg into a venous catheter 1 time for 1 dose.  For Induction:  0, 2 and 6 weeks 5 mL 0     No current facility-administered medications on file prior to visit.        [unfilled]       Objective   There were no vitals taken for this visit.   BMI Readings from Last 15 Encounters:   07/31/24 24.05 kg/m²   06/19/24 24.03 kg/m²   05/07/24 23.00 kg/m²   04/29/24 21.63 kg/m²   04/25/24 22.40 kg/m²   04/17/24 23.00 kg/m²   03/18/24 23.52 kg/m²   02/20/24 23.00 kg/m²   02/09/24 22.49 kg/m²   02/09/23 24.65 kg/m²      Lab Results   Component Value Date    HGBA1C 4.9 02/09/2024      Lab Results   Component Value Date    HGBA1C 4.9 02/09/2024    HGBA1C 5.0 02/09/2023     Lab Results   Component Value Date    LDLCALC 9 02/09/2024    CREATININE 0.53 02/09/2024      Lab Results   Component Value Date    WBC 5.9 06/19/2024    HGB 9.4 (L) 06/19/2024    HCT 32.4 (L) 06/19/2024    MCV 84 06/19/2024     06/19/2024        Chemistry    Lab Results   Component Value Date/Time     02/09/2024 1456    K 3.9 02/09/2024 1456     (H) 02/09/2024 1456    CO2 25 02/09/2024 1456    BUN 13 02/09/2024 1456    CREATININE 0.53 02/09/2024 1456    Lab Results    Component Value Date/Time    CALCIUM 8.8 02/09/2024 1456    ALKPHOS 216 (H) 02/09/2024 1456    AST 12 02/09/2024 1456    ALT 9 02/09/2024 1456    BILITOT 0.2 02/09/2024 1456           No images are attached to the encounter.   [unfilled]           The ASCVD Risk score (Efrain JACKSON, et al., 2019) failed to calculate for the following reasons:    The 2019 ASCVD risk score is only valid for ages 40 to 79     For more information, please refer to:  http://static.heart.org/riskcalc/ulices/index.html  http://tools.acc.org/bkfnt-htxx-mfptyjsyg-plus/    * Atherosclerotic cardiovascular disease (ASCVD), defined as coronary death or nonfatal myocardial infarction, or fatal or nonfatal stroke, based on the Pooled Cohort Equations and the work of Celine et al., respectively.    ** The information required to estimate ASCVD risk includes age, sex, race, total cholesterol, HDL cholesterol, systolic blood pressure, blood pressure lowering medication use, diabetes status, and smoking status.   An interactive audio and video telecommunication system which permits real time communications between the patient (at the originating site) and provider (at the distant site) was utilized to provide this telehealth service.    Verbal consent was requested and obtained from the patient on the day of encounter.  This is a virtual visit using HIPAA compliant video platform. It required patient-provider interaction for the medical decision making as documented.     I have communicated my name and active licensure. The patient's identity and physical location were verified at the time of this visit.   Either the patient or their legal representative has been informed of the risks and benefits of -- and alternatives to -- treatment through a remote evaluation and consents to proceed with the evaluation remotely.    Assessment/Plan      The following medical issues were discussed during this encounter  :     #Iron deficiency anemia  -Iron  levels obtained is 6/19/2024 now at 34 will repeat today  -Will continue with iron supplementation.  She might need to be put back on iron infusions.    #Ulcerative proctitis  -Doing well on Entyvio 300 mg IV weekly infusion  -She has a follow-up appointment with GI at the end of the month    #BRCA1 BRCA2 gene testing positive  -She has a follow-up appointment with oncology at the end of the month  -She has a strong fh of breast cancer   -Doing well and optimistic. She knows to call us with any questions or concerns.     The following labs were ordered to be completed before the patient's next visit: Iron studies     Please return in 3 months or if symptoms worsen     Cheryl Mercedes MD

## 2024-08-05 NOTE — PROGRESS NOTES
I reviewed the resident/fellow's documentation and discussed the patient with the resident/fellow. I agree with the resident/fellow's medical decision making as documented in the note.    Bonnie Lockhart MD

## 2024-08-06 ENCOUNTER — APPOINTMENT (OUTPATIENT)
Dept: GASTROENTEROLOGY | Facility: HOSPITAL | Age: 24
End: 2024-08-06
Payer: COMMERCIAL

## 2024-08-08 ENCOUNTER — OFFICE VISIT (OUTPATIENT)
Dept: GASTROENTEROLOGY | Facility: CLINIC | Age: 24
End: 2024-08-08
Payer: COMMERCIAL

## 2024-08-08 ENCOUNTER — LAB (OUTPATIENT)
Dept: LAB | Facility: LAB | Age: 24
End: 2024-08-08
Payer: COMMERCIAL

## 2024-08-08 VITALS
SYSTOLIC BLOOD PRESSURE: 91 MMHG | HEART RATE: 99 BPM | HEIGHT: 64 IN | WEIGHT: 137.9 LBS | DIASTOLIC BLOOD PRESSURE: 57 MMHG | BODY MASS INDEX: 23.54 KG/M2 | TEMPERATURE: 97.3 F

## 2024-08-08 DIAGNOSIS — D50.0 IRON DEFICIENCY ANEMIA DUE TO CHRONIC BLOOD LOSS: ICD-10-CM

## 2024-08-08 DIAGNOSIS — K51.919 ULCERATIVE COLITIS WITH COMPLICATION, UNSPECIFIED LOCATION (MULTI): ICD-10-CM

## 2024-08-08 DIAGNOSIS — K51.919 ULCERATIVE COLITIS WITH COMPLICATION, UNSPECIFIED LOCATION (MULTI): Primary | ICD-10-CM

## 2024-08-08 LAB
CRP SERPL-MCNC: 3.36 MG/DL
FERRITIN SERPL-MCNC: 66 NG/ML (ref 8–150)
IRON SATN MFR SERPL: 7 % (ref 25–45)
IRON SERPL-MCNC: 17 UG/DL (ref 35–150)
TIBC SERPL-MCNC: 228 UG/DL (ref 240–445)
UIBC SERPL-MCNC: 211 UG/DL (ref 110–370)

## 2024-08-08 PROCEDURE — 36415 COLL VENOUS BLD VENIPUNCTURE: CPT

## 2024-08-08 PROCEDURE — 99214 OFFICE O/P EST MOD 30 MIN: CPT | Performed by: INTERNAL MEDICINE

## 2024-08-08 PROCEDURE — 3008F BODY MASS INDEX DOCD: CPT | Performed by: INTERNAL MEDICINE

## 2024-08-08 PROCEDURE — 83550 IRON BINDING TEST: CPT

## 2024-08-08 PROCEDURE — 86140 C-REACTIVE PROTEIN: CPT

## 2024-08-08 PROCEDURE — 86235 NUCLEAR ANTIGEN ANTIBODY: CPT

## 2024-08-08 PROCEDURE — 86038 ANTINUCLEAR ANTIBODIES: CPT

## 2024-08-08 PROCEDURE — 83540 ASSAY OF IRON: CPT

## 2024-08-08 PROCEDURE — 82728 ASSAY OF FERRITIN: CPT

## 2024-08-08 ASSESSMENT — PAIN SCALES - GENERAL: PAINLEVEL: 0-NO PAIN

## 2024-08-08 NOTE — PATIENT INSTRUCTIONS
Thank you for coming in for follow-up today.  As we discussed, it seems that the Entyvio is at least partially effective at controlling your ulcerative colitis.  We will do some testing to see if you are having any immune reaction to the drug, to see how well it is working to control inflammation, and also to check a drug level immediately before your next infusion.  Based on these results, we will make needed adjustments to your therapy.  As we reviewed today:    PLAN  1) check labs  2) check stool fecal calprotectin  3) immediately before your next Entyvio infusion, asked the infusion nurses to check an Entyvio level as ordered  4) for now, continue Entyvio infusions every 8 weeks  5) make a follow-up appointment for 2 months  6) contact the office if symptoms are worsening

## 2024-08-08 NOTE — PROGRESS NOTES
REASON FOR VISIT:  UC    HPI:  Pastora Rawls is a 23 y.o. female who presents for follow-up.  Diagnosed with UC age 7 (presenting with GI bleeding, diarrhea). Treated with azathioprine and 5-ASA.  Previously followed in Peds GI, but then no F/U for a few years.     10/2023 ED visit for LE edema. Albumin at the time 2.3. Alkphos 242. AST ALT 15/7. Cr 0.46. Hb 7.1 (MCV 69.3). Plts 672.      3/2024 seen as NP and reported 3-4 Bms per day, but can get up to 6-8 times per day, loose. Very diet dependent. Having blood in stool - off and on.  No tenesmus, slight pain with BM. Did report joint pain- specifically her fingers, no swelling of joints. No rashes. (+) weight loss (2021 lost about 50 lbs, but more stable recently). NO fevers.        4/2024 colonoscopy with Mckeon 3 colitis to hepatic flexure.  Path c/w UC.  Started on budesonide (Uceris) and mesalamine and then started Entyvio therapy--> first dose 4/25/24.     5/2024 overall doing OK.  Budesonide (Uceris) x 2 months helped, but even better since got first Entyvio.  She noticed a difference after first infusion.  BMs less frequent and less urgent.  No abdominal pain.  No blood.  Stools down to 3-4 daily; had been 6-7.  Still awakening around 3 AM to stool.  Larger gap between stools during the day.  No IBD EIMS.    In follow-up today, completed induction and has had 1st maintenance dose 7/31/24.  She had a mild reaction to her third induction infusion in June and after most recent first maintenance infusion 7/31/24 noted a rash that developed on inner thigh and under both breasts as well as back also mildly hurting since the infusion. Back pain has resolved.  Last two infusions with low back pain and some discomfort in joints.  Rash has resolved.    Stools usually 1-3 daily, sometimes more depending on what she eats.  Stools still relatively loose.  Rarely sees a small amount of blood in the stool- maybe once a month. Overall feels a lot better, compared  "to before Entyvio.  Eating only twice daily and healthy snacks between meals.  Usually doesn't eat breakfast. If eats fruits and veggies, more stool.  Eating more carbs.    Getting iron infusion from PCP for anemia.  Last infusion was in July.        REVIEW OF SYSTEMS  Complete review of systems otherwise negative per complaint    Allergies   Allergen Reactions    Aspirin Other    Gelatin Itching     Nausea/vomitting    Nsaids (Non-Steroidal Anti-Inflammatory Drug) Itching     Crohn's       Past Medical History:   Diagnosis Date    Anemia     History of lumbar fusion     s/p h/o scoliosis    Personal history of other diseases of the respiratory system     Personal history of asthma    Ulcerative colitis (Multi)     Ulcerative colitis (Multi)        Past Surgical History:   Procedure Laterality Date    BACK SURGERY  09/17/2014    Back Surgery    TONSILLECTOMY  06/11/2014    Tonsillectomy With Adenoidectomy       Current Outpatient Medications   Medication Sig Dispense Refill    ferrous sulfate 300 mg (60 mg iron)/5 mL syrup Take 5 mL (60 mg of iron) by mouth once daily. 150 mL 11    iron sucrose (Venofer) 200 mg iron/10 mL injection Infuse 10 mL (200 mg) over 5 minutes into a venous catheter 1 (one) time per week. 10 mL 2    PRENAT 115-IRON FUM-FOLIC-DSS ORAL Take by mouth.      PRENATAL -IRON-FOLIC ACID ORAL Take by mouth.      vedolizumab (Entyvio) 300 mg injection Infuse 300 mg into a venous catheter 1 time for 1 dose.  For Induction:  0, 2 and 6 weeks 5 mL 0     No current facility-administered medications for this visit.       PHYSICAL EXAM:  BP 91/57 Comment: low bp  Pulse 99   Temp 36.3 °C (97.3 °F) (Temporal)   Ht 1.626 m (5' 4\")   Wt 62.6 kg (137 lb 14.4 oz)   BMI 23.67 kg/m²   Vital signs reviewed  Patient alert and oriented in no acute distress  Anicteric  No cervical adenopathy  Cardiac exam regular rate and rhythm S1-S2 without murmurs gallops or rubs  Lungs clear to auscultation " bilaterally  Abdomen soft with mild tenderness in the lower abdomen with some very mild voluntary guarding but no rebound.  Without organomegaly or mass.    Bowel sounds present  Extremities without edema        Lab Results   Component Value Date    WBC 5.9 06/19/2024    HGB 9.4 (L) 06/19/2024    HCT 32.4 (L) 06/19/2024    MCV 84 06/19/2024     06/19/2024     Lab Results   Component Value Date    ALT 9 02/09/2024    AST 12 02/09/2024    ALKPHOS 216 (H) 02/09/2024    BILITOT 0.2 02/09/2024             ASSESSMENT  #Ulcerative colitis- she has completed Entyvio induction and had 1 maintenance infusion.  After the third and fourth infusion, she had a rash that resolved on its own without therapy.  She was also getting IV iron around the same time.  After the fourth infusion she had some mild joint discomfort that also resolved on its own.  It is difficult to know if she is having an immune reaction to Entyvio and we will check labs to see if this is the case.    Overall, it seems like she has had a reasonable response to Entyvio therapy, though she remains with some chronic symptoms and variable bowel habits.  Most day stools are 1-3 daily, but sometimes she goes more.  This is improvement from her last visit when she was having 3-4 stools daily.  She is not having nocturnal stools any longer.    We we will repeat a stool fecal calprotectin to see for making progress on colonic inflammation.  This, along with an assessment of immunoreactivity to Entyvio, will help determine whether adjustement (I.e. add pre-meds) or change in therapy is needed.  Will also plan to check an Entyvio level ahead of her next infusion in September.  Will see her back in the office in mid October and make plans at that time about whether to stay the course or change therapy.    #PRASAD- PCP following closely need for IV iron repolacement therapy    PLAN  1) check labs  2) check stool fecal calprotectin  3) immediately before your next  Entyvio infusion, asked the infusion nurses to check an Entyvio level as ordered  4) for now, continue Entyvio infusions every 8 weeks  5) make a follow-up appointment for 2 months  6) contact the office if symptoms are worsening

## 2024-08-11 LAB — ANA SER QL HEP2 SUBST: NEGATIVE

## 2024-08-13 LAB — HISTONE IGG SER IA-ACNC: 0.3 UNITS (ref 0–0.9)

## 2024-08-17 NOTE — PROGRESS NOTES
Pastora Rawls female   2000 23 y.o.   [id number]      Chief Complaint  High risk evaluation, BRCA 2 positive    History Of Present Illness  Pastora Rawls is a very pleasant 23 y.o. AA female [facility]. She is here with her grandmother, Laurie. She has a strong family history of breast cancer, see below. She has a family history of BRCA 2 gene mutation in a maternal great aunt and her eight children. She denies breast surgery or biopsy. 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 She presents today to discuss high risk breast surveillance. She declines prophylactic mastectomy.      REPRODUCTIVE HISTORY: menarche age 10, nulliparous, no OCP's, premenopausal, LMP 2024, regular monthly cycles, no contraception at this time, not trying to conceive     FAMILY CANCER HISTORY:  Mother: Breast cancer, age 35, , NO genetics  Maternal Great Aunt: Breast cancer, age 50, BRCA 2 positive  Maternal Great Great Aunt: Breast cancer, age 40  Maternal Third Cousin: Breast cancer, 30s  Maternal Second Cousin (1): BRCA 2 positive  Maternal Second Cousin (2): BRCA 2 positive  Maternal Second Cousin (3): BRCA 2 positive  Maternal Second Cousin (4): BRCA 2 positive  Maternal Second Cousin (5): BRCA 2 positive  Maternal Second Cousin (6): BRCA 2 positive  Maternal Second Cousin (7): BRCA 2 positive  Maternal Second Cousin (8): BRCA 2 positive  Maternal Grandmother: Colon cancer, age 80,         Surgical History  She has a past surgical history that includes Tonsillectomy (2014) and Back surgery (2014).     Social History  She reports that she has never smoked. She has never used smokeless tobacco. She reports that she does not drink alcohol and does not use drugs.    Family History  Family History   Problem Relation Name Age of Onset    Breast cancer Mother      Breast cancer  Mother's Sister      Breast cancer Maternal Grandmother          Allergies  Aspirin, Gelatin, and Nsaids (non-steroidal anti-inflammatory drug)    Medications  Current Outpatient Medications   Medication Instructions    vedolizumab (ENTYVIO) 300 mg, intravenous, Once, For Induction:  0, 2 and 6 weeks    Venofer 200 mg, intravenous, Once Weekly         REVIEW OF SYSTEMS    Constitutional:  Negative for appetite change, fatigue, fever and unexpected weight change.   HENT:  Negative for ear pain, hearing loss, nosebleeds, sore throat and trouble swallowing.    Eyes:  Negative for discharge, itching and visual disturbance.   Respiratory:  Negative for cough, chest tightness and shortness of breath.    Cardiovascular:  Negative for chest pain, palpitations and leg swelling.   Breast: as indicated in HPI  Gastrointestinal:  Negative for abdominal pain, constipation, diarrhea and nausea.   Endocrine: Negative for cold intolerance and heat intolerance.   Genitourinary:  Negative for dysuria, frequency, hematuria, pelvic pain and vaginal bleeding.   Musculoskeletal:  Negative for arthralgias, back pain, gait problem, joint swelling and myalgias.   Skin:  Negative for color change and rash.   Allergic/Immunologic: Negative for environmental allergies and food allergies.   Neurological:  Negative for dizziness, tremors, speech difficulty, weakness, numbness and headaches.   Hematological:  Does not bruise/bleed easily.   Psychiatric/Behavioral:  Negative for agitation, dysphoric mood and sleep disturbance. The patient is not nervous/anxious.         Past Medical History  She has a past medical history of Anemia, History of lumbar fusion, Personal history of other diseases of the respiratory system, Ulcerative colitis (Multi), and Ulcerative colitis (Multi).    She has no past medical history of Abnormal Pap smear of cervix, Chlamydia, Congenital heart disease ([facility]), CTS (carpal tunnel syndrome), Depression, Disease of  thyroid gland, Endometriosis, Epilepsy (Multi), Female infertility, Fibroid, Gestational diabetes (HHS-HCC), Gonorrhea, Hepatitis B, Hepatitis C, Herpes, HIV disease (Multi), HPV (human papilloma virus) infection, Hyperthyroidism, Hypothyroidism, Kidney stone, Lupus (Multi), Migraine, Ovarian cancer (Multi), PID (pelvic inflammatory disease), Polycystic ovary syndrome, Pulmonary embolism (Multi), Pyelonephritis, Recurrent pregnancy loss, antepartum condition or complication (HHS-HCC), Rh incompatibility, Rheumatoid arthritis (Multi), Sickle cell anemia (Multi), Syphilis, Tuberculosis, Urinary tract infection, Urogenital trichomoniasis, Urolithiasis, Uterine cancer (Multi), or Varicella.     Physical Exam  Patient is alert and oriented x3 and in a relaxed and appropriate mood. Her gait is steady and hand grasps are equal. Sclera is clear. The breasts are nearly symmetrical, large and pendulous. The tissue is dense and grainy without palpable abnormalities, discrete nodules or masses. The skin and nipples appear normal. There is no cervical, supraclavicular or axillary lymphadenopathy. Heart rate and rhythm normal, S1 and S2 appreciated. The lungs are clear to auscultation bilaterally. Abdomen is soft and non-tender.       Physical Exam     Last Recorded Vitals  Vitals:    08/26/24 1038   BP: 102/67   Pulse: 86     Risk Profiles          Assessment/Plan   High risk surveillance care, normal clinical exam, family history of BRCA 2 gene mutation, BRCA 2 positive, VUS in RAD51D, family history of breast cancer     Plan: Schedule Full breast MRI, call with results. Return in February/March 2025 for clinical exam. Referred to Gynecology Oncology to discuss ovarian surveillance.     Patient Discussion/Summary  Your clinical examination is normal. Please return in February 2025 for clinical exam and office visit or sooner if you have any problems or concerns.     High risk breast surveillance care plan:  Yearly mammogram  with digital breast tomosynthesis  Twice yearly clinical breast examinations  Breast MRI - Full MRI now  Monthly self breast examinations  Vitamin D3 2000 IU/daily (over the counter)  Exercise 3-4 times per week for 45-60 minutes  Limit alcohol to 3-4 drinks per week   Eat a healthy low-fat diet with lots of fruits and vegetables    You can see your health information, review clinical summaries from office visits & test results online when you follow your health with MY  Chart, a personal health record. To sign up go to [email or web address]. If you need assistance with signing up or trouble getting into your account call [facility] Patient Line 24/7 at [phone].    My office phone number is [phone] if you need to get in touch with me or have additional questions or concerns. Thank you for choosing [facility] and trusting me as your healthcare provider. I look forward to seeing you again at your next office visit. I am honored to be a provider on your health care team and I remain dedicated to helping you achieve your health goals.      Jia Art, APRN-CNP

## 2024-08-19 ENCOUNTER — LAB (OUTPATIENT)
Dept: LAB | Facility: LAB | Age: 24
End: 2024-08-19
Payer: COMMERCIAL

## 2024-08-19 DIAGNOSIS — K51.919 ULCERATIVE COLITIS WITH COMPLICATION, UNSPECIFIED LOCATION (MULTI): ICD-10-CM

## 2024-08-19 DIAGNOSIS — R10.30 LOWER ABDOMINAL PAIN: ICD-10-CM

## 2024-08-19 PROCEDURE — 83993 ASSAY FOR CALPROTECTIN FECAL: CPT

## 2024-08-21 LAB — CALPROTECTIN STL-MCNT: 2460 UG/G

## 2024-08-26 ENCOUNTER — OFFICE VISIT (OUTPATIENT)
Dept: SURGICAL ONCOLOGY | Facility: CLINIC | Age: 24
End: 2024-08-26
Payer: COMMERCIAL

## 2024-08-26 ENCOUNTER — PATIENT MESSAGE (OUTPATIENT)
Dept: SURGICAL ONCOLOGY | Facility: CLINIC | Age: 24
End: 2024-08-26
Payer: COMMERCIAL

## 2024-08-26 VITALS
SYSTOLIC BLOOD PRESSURE: 102 MMHG | HEART RATE: 86 BPM | BODY MASS INDEX: 24.52 KG/M2 | DIASTOLIC BLOOD PRESSURE: 67 MMHG | WEIGHT: 142.86 LBS

## 2024-08-26 DIAGNOSIS — Z12.39 BREAST CANCER SCREENING, HIGH RISK PATIENT: Primary | ICD-10-CM

## 2024-08-26 DIAGNOSIS — Z15.09 BRCA2 POSITIVE: ICD-10-CM

## 2024-08-26 DIAGNOSIS — Z15.01 BRCA2 POSITIVE: ICD-10-CM

## 2024-08-26 PROCEDURE — 99214 OFFICE O/P EST MOD 30 MIN: CPT | Performed by: NURSE PRACTITIONER

## 2024-08-26 ASSESSMENT — PAIN SCALES - GENERAL: PAINLEVEL: 0-NO PAIN

## 2024-08-26 NOTE — PATIENT INSTRUCTIONS
Your clinical examination is normal. Please return in February 2025 for clinical exam and office visit or sooner if you have any problems or concerns.     High risk breast surveillance care plan:  Yearly mammogram with digital breast tomosynthesis  Twice yearly clinical breast examinations  Breast MRI - Full MRI now  Monthly self breast examinations  Vitamin D3 2000 IU/daily (over the counter)  Exercise 3-4 times per week for 45-60 minutes  Limit alcohol to 3-4 drinks per week   Eat a healthy low-fat diet with lots of fruits and vegetables    You can see your health information, review clinical summaries from office visits & test results online when you follow your health with MY  Chart, a personal health record. To sign up go to [email or web address]. If you need assistance with signing up or trouble getting into your account call [employer] Patient Line 24/7 at [phone].    My office phone number is [phone] if you need to get in touch with me or have additional questions or concerns. Thank you for choosing [facility] and trusting me as your healthcare provider. I look forward to seeing you again at your next office visit. I am honored to be a provider on your health care team and I remain dedicated to helping you achieve your health goals.

## 2024-09-23 NOTE — PROGRESS NOTES
Division of Minimally Invasive Gynecologic Surgery  [facility]    24 Gynecology Visit    CC:   Chief Complaint   Patient presents with    New Patient Visit       Pastora Rawls is a 24 y.o. referred to our practice for BRCA 2 mutation    Patient was diagnosed with BRCA 2 mutation but has no personal h/o breast cancer. She is planning for breast cancer surveillance and is not planning on prophylactic mastectomy at this time.     Prior Therapies: none      GYN Hx  Gynecologic history:  Contraception/menstrual regulation: none  Desires Childbearing: yes  Last pap: NILM   Breast MRI ordered  Mother  at 39- breast cancer, maternal aunt ovarian cancer.      OBHx: G0  Pertinent PMH: BRCA2, UC  Pertient PSH: none    PMHx, PSHx, SHx, Allergies, and Medications updated in Epic.  Past Medical History:   Diagnosis Date    Anemia     History of lumbar fusion     s/p h/o scoliosis    Personal history of other diseases of the respiratory system     Personal history of asthma    Ulcerative colitis (Multi)     Ulcerative colitis (Multi)      Past Surgical History:   Procedure Laterality Date    BACK SURGERY  2014    Back Surgery    TONSILLECTOMY  2014    Tonsillectomy With Adenoidectomy     Allergies   Allergen Reactions    Aspirin Other    Gelatin Itching     Nausea/vomitting    Nsaids (Non-Steroidal Anti-Inflammatory Drug) Itching     Crohn's       Current Outpatient Medications:     ferrous sulfate, 325 mg ferrous sulfate, tablet, Take 1 tablet (325 mg) by mouth once daily with breakfast., Disp: , Rfl:     iron sucrose (Venofer) 200 mg iron/10 mL injection, Infuse 10 mL (200 mg) over 5 minutes into a venous catheter 1 (one) time per week. (Patient not taking: Reported on 2024), Disp: 10 mL, Rfl: 2    vedolizumab (Entyvio) 300 mg injection, Infuse 300 mg into a venous catheter 1 time for 1 dose.  For Induction:  0, 2 and 6 weeks, Disp: 5 mL, Rfl: 0  No  current facility-administered medications for this visit.  Social History     Socioeconomic History    Marital status:      Spouse name: Not on file    Number of children: Not on file    Years of education: Not on file    Highest education level: Not on file   Occupational History    Not on file   Tobacco Use    Smoking status: Never    Smokeless tobacco: Never   Vaping Use    Vaping status: Never Used   Substance and Sexual Activity    Alcohol use: Never    Drug use: Never    Sexual activity: Yes     Partners: Male   Other Topics Concern    Not on file   Social History Narrative    Not on file     Social Determinants of Health     Financial Resource Strain: Not on file   Food Insecurity: No Food Insecurity (2024)    Hunger Vital Sign     Worried About Running Out of Food in the Last Year: Never true     Ran Out of Food in the Last Year: Never true   Transportation Needs: No Transportation Needs (2024)    PRAPARE - Transportation     Lack of Transportation (Medical): No     Lack of Transportation (Non-Medical): No   Physical Activity: Not on file   Stress: Not on file   Social Connections: Not on file   Intimate Partner Violence: Not At Risk (2024)    Humiliation, Afraid, Rape, and Kick questionnaire     Fear of Current or Ex-Partner: No     Emotionally Abused: No     Physically Abused: No     Sexually Abused: No   Housing Stability: Not on file     Family History   Problem Relation Name Age of Onset    Breast cancer Mother      Breast cancer Mother's Sister      Breast cancer Maternal Grandmother       OB History          1    Para   0    Term                AB        Living             SAB        IAB        Ectopic        Multiple        Live Births                        ROS: reviewed and negative    PE:/67   Wt 68 kg (150 lb)   BMI 25.75 kg/m²   Gen: NAD  Psych: AAOx3  Skin: no lesions  Pulm: nonlabored breathing, normal respiratory effort  Cards: normal peripheral  perfusion  Lymph: no LAD in axilla or groin  GI: soft, non-tender, non-distended, no rebound/guarding, no masses  :  External Genitalia: vulva without lesions, normal hair distribution  Urethral meatus: normal size and without massesBladder: non-tender, no masses or tenderness  Vagina: normal discharge, no lesions or masses  Cervix: without discharge or lesions, no cervical masses, no CMT  Uterus: non tender and not enlarged, mobile  Adnexa: non tender and not enlarged  Anus/Perineum: normal appearance      A/P: Pastora Rawls is a 24 y.o. with BRCA 2 mutation    # BRCA2 mutation  - We reviewed cancers associated with the BRCA2 mutation, and their relative risks. Specifically we discussed an approximately  13 to 25% lifetime risk of developing fallopian, ovarian or primary peritoneal cancer.   - We discussed that there is no effective screening for ovarian cancer  - We discussed preventative measures such as COCs  - We discussed the recommendation for rrBSO at the age of  40-45 with BRCA 2 or once childbearing is complete  - We discussed that in about 12% of rrBSO surgeries an occult malignancy is discovered  - She is interested not in rrBSO at this time given her young age and desire for fertility eventually.    - she was counseled on options for ovulation suppression and risk reduction. She is hesitant to start any medication given their possible stimuation on the breast given that her mom  at a young age and her cousin  at 24. Discussed that hormones are generally considered safe in this population and can provide risk reduction of ovarian cancer, but if a breast cancer was present and hormone sensitive, could cause stimulation. Patient would like to think about her options and let us know     RTC 1 year    Felecia Claire MD  Division of Minimally Invasive Gynecologic Surgery  [facility]

## 2024-09-25 ENCOUNTER — APPOINTMENT (OUTPATIENT)
Dept: INFUSION THERAPY | Facility: CLINIC | Age: 24
End: 2024-09-25
Payer: COMMERCIAL

## 2024-09-25 VITALS
RESPIRATION RATE: 16 BRPM | DIASTOLIC BLOOD PRESSURE: 61 MMHG | SYSTOLIC BLOOD PRESSURE: 96 MMHG | TEMPERATURE: 97.3 F | BODY MASS INDEX: 25.73 KG/M2 | OXYGEN SATURATION: 98 % | WEIGHT: 149.91 LBS | HEART RATE: 60 BPM

## 2024-09-25 DIAGNOSIS — K51.219 ULCERATIVE PROCTITIS WITH COMPLICATION (MULTI): ICD-10-CM

## 2024-09-25 DIAGNOSIS — K51.919 ULCERATIVE COLITIS WITH COMPLICATION, UNSPECIFIED LOCATION (MULTI): ICD-10-CM

## 2024-09-25 PROCEDURE — 80280 DRUG ASSAY VEDOLIZUMAB: CPT

## 2024-09-25 PROCEDURE — 96365 THER/PROPH/DIAG IV INF INIT: CPT | Performed by: NURSE PRACTITIONER

## 2024-09-25 RX ORDER — ALBUTEROL SULFATE 0.83 MG/ML
3 SOLUTION RESPIRATORY (INHALATION) AS NEEDED
OUTPATIENT
Start: 2024-11-20

## 2024-09-25 RX ORDER — EPINEPHRINE 0.3 MG/.3ML
0.3 INJECTION SUBCUTANEOUS EVERY 5 MIN PRN
OUTPATIENT
Start: 2024-11-20

## 2024-09-25 RX ORDER — DIPHENHYDRAMINE HYDROCHLORIDE 50 MG/ML
50 INJECTION INTRAMUSCULAR; INTRAVENOUS AS NEEDED
OUTPATIENT
Start: 2024-11-20

## 2024-09-25 RX ORDER — FAMOTIDINE 10 MG/ML
20 INJECTION INTRAVENOUS ONCE AS NEEDED
OUTPATIENT
Start: 2024-11-20

## 2024-09-25 RX ORDER — FERROUS SULFATE 325(65) MG
325 TABLET ORAL
COMMUNITY

## 2024-09-25 ASSESSMENT — ENCOUNTER SYMPTOMS
VOMITING: 1
MYALGIAS: 0
COUGH: 0
DIZZINESS: 0
ARTHRALGIAS: 0
UNEXPECTED WEIGHT CHANGE: 0
SORE THROAT: 0
FEVER: 0
EXTREMITY WEAKNESS: 0
WHEEZING: 0
PALPITATIONS: 0
HEMATURIA: 0
CHILLS: 0
NERVOUS/ANXIOUS: 0
NAUSEA: 1
FREQUENCY: 0
WOUND: 0
BLOOD IN STOOL: 0
DIARRHEA: 1
TROUBLE SWALLOWING: 0
NUMBNESS: 0
LEG SWELLING: 0
DEPRESSION: 0
EYE PROBLEMS: 0
HEADACHES: 0
SHORTNESS OF BREATH: 0
CONSTIPATION: 0
ABDOMINAL PAIN: 1
DYSURIA: 0
LIGHT-HEADEDNESS: 0
FATIGUE: 0
VOICE CHANGE: 0
APPETITE CHANGE: 0

## 2024-09-25 NOTE — PROGRESS NOTES
[facility]   Infusion Clinic Note   Date: 2024   Name: Pastora Rawls  : 2000   MRN: [id number]          Reason for Visit: Follow-up and OP Infusion (PT HERE FOR ENTYVIO 300 MG INFUSION EVERY 56 DAYS)         Today: We administered vedolizumab (Entyvio) 300 mg in sodium chloride 0.9% 255 mL IV.       Visit Type: INFUSION       Ordered By: DR. MCKEON       Accompanied by:GRANDMOTHER       Diagnosis: Ulcerative proctitis with complication (Multi)    Ulcerative colitis with complication, unspecified location (Multi)        Allergies:   Allergies as of 2024 - Reviewed 2024   Allergen Reaction Noted    Aspirin Other 2024    Gelatin Itching 10/05/2023    Nsaids (non-steroidal anti-inflammatory drug) Itching 10/05/2023          Current Medications has a current medication list which includes the following prescription(s): ferrous sulfate (325 mg ferrous sulfate), venofer, and vedolizumab.       Vitals:   Vitals:    24 1315 24 1420   BP: 99/61 96/61   Pulse: 90 60   Resp: 16 16   Temp: 37 °C (98.6 °F) 36.3 °C (97.3 °F)   TempSrc: Temporal Temporal   SpO2: 99% 98%   Weight: 68 kg (149 lb 14.6 oz)              Infusion Pre-procedure Checklist:   - Allergies reviewed: yes   - Medications reviewed: yes       - Previous reaction to current treatment: no      Assess patient for the concerns below. Document provider notification as appropriate.  - Active or recent infection with/without current antibiotic use: no  - Recent or planned invasive dental work: no  - Recent or planned surgeries: no  - Recently received or plans to receive vaccinations: no  - Has treatment related toxicities: yes- BACK PAIN ONE WEEK POST INFUSION  - Is pregnant:  no      Pain: 0   - Is the pain different from normal: n/a   - Is your Doctor aware:  n/a       Labs: Labs drawn and sent per order          Fall Risk Screening: Chrissy Fall Risk  History of Falling, Immediate or  Within 3 Months: No  Secondary Diagnosis: No  Ambulatory Aid: Walks without aid/bedrest/nurse assist  Intravenous Therapy/Heparin Lock: Yes  Gait/Transferring: Normal/bedrest/immobile  Mental Status: Oriented to own ability  Lowe Fall Risk Score: 20       Review Of Systems:  Review of Systems   Constitutional:  Negative for appetite change, chills, fatigue, fever and unexpected weight change.   HENT:   Negative for hearing loss, mouth sores, sore throat, tinnitus, trouble swallowing and voice change.    Eyes:  Negative for eye problems.   Respiratory:  Negative for cough, shortness of breath and wheezing.    Cardiovascular:  Negative for chest pain, leg swelling and palpitations.   Gastrointestinal:  Positive for abdominal pain, diarrhea, nausea and vomiting. Negative for blood in stool and constipation.        PT WITH A DX OF IBD REPORTS #  5 VARIES  BM'S PER DAY. reports NOTING OCCASIONAL BLOOD/MUCUS TO STOOLS.  Reports OCCASIONAL  C/O OF ABDOMINAL PAIN AND/OR BOUTS OF NOCTURNAL STOOLING.      Genitourinary:  Negative for dysuria, frequency and hematuria.    Musculoskeletal:  Negative for arthralgias and myalgias.   Skin:  Positive for rash. Negative for itching and wound.        UNDER BREASTS COMES AND GOES   Neurological:  Negative for dizziness, extremity weakness, headaches, light-headedness and numbness.   Psychiatric/Behavioral:  Negative for depression. The patient is not nervous/anxious.          ROS completed? Yes      Infusion Readiness:  - Assessment Concerns Related to Infusion: No  - Provider notified: n/a      Document Below Only If Indicated:   New Patient Education:    N/A (returning patient for continuation of therapy. Ongoing education provided as needed.)        Treatment Conditions & Drug Specific Questions:    Vedolizumab  (ENTYVIO)    (Unless otherwise specified on patient specific therapy plan):     TREATMENT CONDITIONS:  Unless otherwise specified on patient specific therapy plan HOLD and  "notify provider prior to proceeding with treatment if:   o Positive Hepatitis B Surface Ag  o Positive T-Spot  o Patient has signs or symptoms of Progressive Multifocal Leukoencephalopath (PML)     Lab Results   Component Value Date    HEPBSAG Nonreactive 02/20/2024      No results found for: \"NONUHFIRE\", \"NONUHSWGH\", \"NONUHFISH\", \"EXTHEPBSAG\"  Lab Results   Component Value Date    TBSIN Negative 02/09/2024      Lab Results   Component Value Date    ALT 9 02/09/2024    AST 12 02/09/2024    ALKPHOS 216 (H) 02/09/2024    BILITOT 0.2 02/09/2024        Labs reviewed and patient meets treatment conditions? Yes    DRUG SPECIFIC QUESTIONS:  Any signs or symptoms of Progressive Multifocal Leukoencephalopath (PML) which may include: memory loss, trouble thinking, loss of balance, difficulty talking or walking, loss of vision?  No    (If YES notify prescribing provider prior to proceeding)    REMINDERS:  Recommended Vitals/Observation:  Vitals: Monitor vitals at start of infusion, at 15 minutes and at completion of infusion.  Observation: First 3 infusions patient may leave 15 minutes post infusion. Subsequent maintenance infusions: if no reaction, may leave immediately post infusion.        Weight Based Drug Calculations:    WEIGHT BASED DRUGS: NOT APPLICABLE / FLAT DOSE          Initiated By: Felecia Dixon RN        "

## 2024-09-25 NOTE — PATIENT INSTRUCTIONS
Today :We administered vedolizumab (Entyvio) 300 mg in sodium chloride 0.9% 255 mL IV.     For:   1. Ulcerative proctitis with complication (Multi)    2. Ulcerative colitis with complication, unspecified location (Multi)         Your next appointment is due in:  56 days        Please read the  Medication Guide that was given to you and reviewed during todays visit.     (Tell all doctors including dentists that you are taking this medication)     Go to the emergency room or call 911 if:  -You have signs of allergic reaction:   -Rash, hives, itching.   -Swollen, blistered, peeling skin.   -Swelling of face, lips, mouth, tongue or throat.   -Tightness of chest, trouble breathing, swallowing or talking     Call your doctor:  - If IV / injection site gets red, warm, swollen, itchy or leaks fluid or pus.     (Leave dressing on your IV site for at least 2 hours and keep area clean and dry  - If you get sick or have symptoms of infection or are not feeling well for any reason.    (Wash your hands often, stay away from people who are sick)  - If you have side effects from your medication that do not go away or are bothersome.     (Refer to the teaching your nurse gave you for side effects to call your doctor about)    - Common side effects may include:  stuffy nose, headache, feeling tired, muscle aches, upset stomach  - Before receiving any vaccines     - Call the [facility] at   If:  - You get sick, are on antibiotics, have had a recent vaccine, have surgery or dental work and your doctor wants your visit rescheduled.  - You need to cancel and reschedule your visit for any reason. Call at least 2 days before your visit if you need to cancel.   - Your insurance changes before your next visit.    (We will need to get approval from your new insurance. This can take up to two weeks.)     The [facility] is opened Monday thru Friday. We are closed on weekends and holidays.   Voice mail will take  your call if the center is closed. If you leave a message please allow 24 hours for a call back during weekdays. If you leave a message on a weekend/holiday, we will call you back the next business day.

## 2024-09-26 ENCOUNTER — OFFICE VISIT (OUTPATIENT)
Dept: OBSTETRICS AND GYNECOLOGY | Facility: CLINIC | Age: 24
End: 2024-09-26
Payer: COMMERCIAL

## 2024-09-26 VITALS — DIASTOLIC BLOOD PRESSURE: 67 MMHG | WEIGHT: 150 LBS | BODY MASS INDEX: 25.75 KG/M2 | SYSTOLIC BLOOD PRESSURE: 108 MMHG

## 2024-09-26 DIAGNOSIS — Z15.01 BRCA2 GENE MUTATION POSITIVE: Primary | ICD-10-CM

## 2024-09-26 DIAGNOSIS — Z15.09 BRCA2 GENE MUTATION POSITIVE: Primary | ICD-10-CM

## 2024-09-26 PROCEDURE — 99214 OFFICE O/P EST MOD 30 MIN: CPT | Performed by: STUDENT IN AN ORGANIZED HEALTH CARE EDUCATION/TRAINING PROGRAM

## 2024-09-26 PROCEDURE — 1036F TOBACCO NON-USER: CPT | Performed by: STUDENT IN AN ORGANIZED HEALTH CARE EDUCATION/TRAINING PROGRAM

## 2024-09-26 ASSESSMENT — ENCOUNTER SYMPTOMS
PSYCHIATRIC NEGATIVE: 0
MUSCULOSKELETAL NEGATIVE: 0
ENDOCRINE NEGATIVE: 0
HEMATOLOGIC/LYMPHATIC NEGATIVE: 0
RESPIRATORY NEGATIVE: 0
CONSTITUTIONAL NEGATIVE: 0
NEUROLOGICAL NEGATIVE: 0
EYES NEGATIVE: 0
GASTROINTESTINAL NEGATIVE: 0
CARDIOVASCULAR NEGATIVE: 0
ALLERGIC/IMMUNOLOGIC NEGATIVE: 0

## 2024-09-26 ASSESSMENT — PAIN SCALES - GENERAL: PAINLEVEL: 0-NO PAIN

## 2024-09-30 LAB
SCAN RESULT: NORMAL
VEDOLIZUMAB AB SERPL IA-MCNC: <1.6 U/ML
VEDOLIZUMAB SERPL IA-MCNC: 2.1 UG/ML

## 2024-10-09 DIAGNOSIS — K51.219 ULCERATIVE PROCTITIS WITH COMPLICATION (MULTI): Primary | ICD-10-CM

## 2024-10-09 DIAGNOSIS — K51.219 ULCERATIVE PROCTITIS WITH COMPLICATION (MULTI): ICD-10-CM

## 2024-10-09 RX ORDER — EPINEPHRINE 0.3 MG/.3ML
0.3 INJECTION SUBCUTANEOUS EVERY 5 MIN PRN
OUTPATIENT
Start: 2024-10-23

## 2024-10-09 RX ORDER — FAMOTIDINE 10 MG/ML
20 INJECTION INTRAVENOUS ONCE AS NEEDED
OUTPATIENT
Start: 2024-10-23

## 2024-10-09 RX ORDER — DIPHENHYDRAMINE HYDROCHLORIDE 50 MG/ML
50 INJECTION INTRAMUSCULAR; INTRAVENOUS AS NEEDED
OUTPATIENT
Start: 2024-10-23

## 2024-10-09 RX ORDER — ALBUTEROL SULFATE 0.83 MG/ML
3 SOLUTION RESPIRATORY (INHALATION) AS NEEDED
OUTPATIENT
Start: 2024-10-23

## 2024-10-22 ENCOUNTER — HOSPITAL ENCOUNTER (OUTPATIENT)
Dept: RADIOLOGY | Facility: HOSPITAL | Age: 24
Discharge: HOME | End: 2024-10-22
Payer: COMMERCIAL

## 2024-10-22 DIAGNOSIS — Z15.01 BRCA2 POSITIVE: ICD-10-CM

## 2024-10-22 DIAGNOSIS — Z15.09 BRCA2 POSITIVE: ICD-10-CM

## 2024-10-22 DIAGNOSIS — Z12.39 BREAST CANCER SCREENING, HIGH RISK PATIENT: ICD-10-CM

## 2024-10-22 PROCEDURE — 77049 MRI BREAST C-+ W/CAD BI: CPT | Performed by: STUDENT IN AN ORGANIZED HEALTH CARE EDUCATION/TRAINING PROGRAM

## 2024-10-22 PROCEDURE — 77049 MRI BREAST C-+ W/CAD BI: CPT

## 2024-10-22 PROCEDURE — A9575 INJ GADOTERATE MEGLUMI 0.1ML: HCPCS | Performed by: NURSE PRACTITIONER

## 2024-10-22 PROCEDURE — 2550000001 HC RX 255 CONTRASTS: Performed by: NURSE PRACTITIONER

## 2024-10-22 RX ORDER — GADOTERATE MEGLUMINE 376.9 MG/ML
13 INJECTION INTRAVENOUS
Status: COMPLETED | OUTPATIENT
Start: 2024-10-22 | End: 2024-10-22

## 2024-10-29 ENCOUNTER — SPECIALTY PHARMACY (OUTPATIENT)
Dept: PHARMACY | Facility: CLINIC | Age: 24
End: 2024-10-29

## 2024-10-29 ENCOUNTER — OFFICE VISIT (OUTPATIENT)
Dept: GASTROENTEROLOGY | Facility: HOSPITAL | Age: 24
End: 2024-10-29
Payer: COMMERCIAL

## 2024-10-29 ENCOUNTER — LAB (OUTPATIENT)
Dept: LAB | Facility: LAB | Age: 24
End: 2024-10-29
Payer: COMMERCIAL

## 2024-10-29 VITALS
DIASTOLIC BLOOD PRESSURE: 65 MMHG | SYSTOLIC BLOOD PRESSURE: 94 MMHG | OXYGEN SATURATION: 100 % | TEMPERATURE: 97.3 F | HEART RATE: 102 BPM | WEIGHT: 144.2 LBS | BODY MASS INDEX: 24.75 KG/M2

## 2024-10-29 DIAGNOSIS — D50.0 IRON DEFICIENCY ANEMIA DUE TO CHRONIC BLOOD LOSS: ICD-10-CM

## 2024-10-29 DIAGNOSIS — K51.919 ULCERATIVE COLITIS WITH COMPLICATION, UNSPECIFIED LOCATION (MULTI): Primary | ICD-10-CM

## 2024-10-29 DIAGNOSIS — K51.919 ULCERATIVE COLITIS WITH COMPLICATION, UNSPECIFIED LOCATION (MULTI): ICD-10-CM

## 2024-10-29 LAB
BASOPHILS # BLD AUTO: 0.03 X10*3/UL (ref 0–0.1)
BASOPHILS NFR BLD AUTO: 0.6 %
CRP SERPL-MCNC: 3.28 MG/DL
EOSINOPHIL # BLD AUTO: 0.38 X10*3/UL (ref 0–0.7)
EOSINOPHIL NFR BLD AUTO: 7.4 %
ERYTHROCYTE [DISTWIDTH] IN BLOOD BY AUTOMATED COUNT: 14.6 % (ref 11.5–14.5)
FERRITIN SERPL-MCNC: 26 NG/ML (ref 8–150)
HCT VFR BLD AUTO: 32 % (ref 36–46)
HGB BLD-MCNC: 9.7 G/DL (ref 12–16)
IMM GRANULOCYTES # BLD AUTO: 0.01 X10*3/UL (ref 0–0.7)
IMM GRANULOCYTES NFR BLD AUTO: 0.2 % (ref 0–0.9)
IRON SATN MFR SERPL: 4 % (ref 25–45)
IRON SERPL-MCNC: 12 UG/DL (ref 35–150)
LYMPHOCYTES # BLD AUTO: 1.8 X10*3/UL (ref 1.2–4.8)
LYMPHOCYTES NFR BLD AUTO: 34.9 %
MCH RBC QN AUTO: 24.3 PG (ref 26–34)
MCHC RBC AUTO-ENTMCNC: 30.3 G/DL (ref 32–36)
MCV RBC AUTO: 80 FL (ref 80–100)
MONOCYTES # BLD AUTO: 0.66 X10*3/UL (ref 0.1–1)
MONOCYTES NFR BLD AUTO: 12.8 %
NEUTROPHILS # BLD AUTO: 2.28 X10*3/UL (ref 1.2–7.7)
NEUTROPHILS NFR BLD AUTO: 44.1 %
NRBC BLD-RTO: 0 /100 WBCS (ref 0–0)
PLATELET # BLD AUTO: 440 X10*3/UL (ref 150–450)
RBC # BLD AUTO: 3.99 X10*6/UL (ref 4–5.2)
TIBC SERPL-MCNC: 326 UG/DL (ref 240–445)
UIBC SERPL-MCNC: 314 UG/DL (ref 110–370)
WBC # BLD AUTO: 5.2 X10*3/UL (ref 4.4–11.3)

## 2024-10-29 PROCEDURE — 1036F TOBACCO NON-USER: CPT | Performed by: INTERNAL MEDICINE

## 2024-10-29 PROCEDURE — 83550 IRON BINDING TEST: CPT

## 2024-10-29 PROCEDURE — 36415 COLL VENOUS BLD VENIPUNCTURE: CPT

## 2024-10-29 PROCEDURE — 83540 ASSAY OF IRON: CPT

## 2024-10-29 PROCEDURE — 99214 OFFICE O/P EST MOD 30 MIN: CPT | Performed by: INTERNAL MEDICINE

## 2024-10-29 PROCEDURE — 85025 COMPLETE CBC W/AUTO DIFF WBC: CPT

## 2024-10-29 PROCEDURE — 86140 C-REACTIVE PROTEIN: CPT

## 2024-10-29 PROCEDURE — 82728 ASSAY OF FERRITIN: CPT

## 2024-10-29 RX ORDER — BUDESONIDE 9 MG/1
9 TABLET, FILM COATED, EXTENDED RELEASE ORAL DAILY
Qty: 30 TABLET | Refills: 1 | Status: SHIPPED | OUTPATIENT
Start: 2024-10-29 | End: 2024-12-28

## 2024-10-29 RX ORDER — HYDROCORTISONE 25 MG/G
CREAM TOPICAL 2 TIMES DAILY
Qty: 28 G | Refills: 3 | Status: SHIPPED | OUTPATIENT
Start: 2024-10-29

## 2024-10-29 SDOH — ECONOMIC STABILITY: FOOD INSECURITY: WITHIN THE PAST 12 MONTHS, YOU WORRIED THAT YOUR FOOD WOULD RUN OUT BEFORE YOU GOT MONEY TO BUY MORE.: NEVER TRUE

## 2024-10-29 SDOH — ECONOMIC STABILITY: FOOD INSECURITY: WITHIN THE PAST 12 MONTHS, THE FOOD YOU BOUGHT JUST DIDN'T LAST AND YOU DIDN'T HAVE MONEY TO GET MORE.: NEVER TRUE

## 2024-10-29 ASSESSMENT — LIFESTYLE VARIABLES
HOW OFTEN DO YOU HAVE A DRINK CONTAINING ALCOHOL: NEVER
HOW OFTEN DO YOU HAVE SIX OR MORE DRINKS ON ONE OCCASION: NEVER
SKIP TO QUESTIONS 9-10: 1
AUDIT-C TOTAL SCORE: 0
HOW MANY STANDARD DRINKS CONTAINING ALCOHOL DO YOU HAVE ON A TYPICAL DAY: PATIENT DOES NOT DRINK

## 2024-10-29 ASSESSMENT — PATIENT HEALTH QUESTIONNAIRE - PHQ9
2. FEELING DOWN, DEPRESSED OR HOPELESS: NOT AT ALL
SUM OF ALL RESPONSES TO PHQ9 QUESTIONS 1 & 2: 0
1. LITTLE INTEREST OR PLEASURE IN DOING THINGS: NOT AT ALL

## 2024-10-29 ASSESSMENT — PAIN SCALES - GENERAL: PAINLEVEL_OUTOF10: 0-NO PAIN

## 2024-11-15 DIAGNOSIS — D50.0 IRON DEFICIENCY ANEMIA DUE TO CHRONIC BLOOD LOSS: Primary | ICD-10-CM

## 2024-11-15 DIAGNOSIS — K51.911 ULCERATIVE COLITIS WITH RECTAL BLEEDING, UNSPECIFIED LOCATION (MULTI): ICD-10-CM

## 2024-11-15 RX ORDER — FAMOTIDINE 10 MG/ML
20 INJECTION INTRAVENOUS ONCE AS NEEDED
OUTPATIENT
Start: 2024-11-15

## 2024-11-15 RX ORDER — DIPHENHYDRAMINE HYDROCHLORIDE 50 MG/ML
50 INJECTION INTRAMUSCULAR; INTRAVENOUS AS NEEDED
OUTPATIENT
Start: 2024-11-15

## 2024-11-15 RX ORDER — EPINEPHRINE 0.3 MG/.3ML
0.3 INJECTION SUBCUTANEOUS EVERY 5 MIN PRN
OUTPATIENT
Start: 2024-11-15

## 2024-11-15 RX ORDER — ALBUTEROL SULFATE 0.83 MG/ML
3 SOLUTION RESPIRATORY (INHALATION) AS NEEDED
OUTPATIENT
Start: 2024-11-15

## 2024-11-19 ENCOUNTER — DOCUMENTATION (OUTPATIENT)
Dept: INFUSION THERAPY | Facility: CLINIC | Age: 24
End: 2024-11-19
Payer: COMMERCIAL

## 2024-11-19 DIAGNOSIS — D50.0 IRON DEFICIENCY ANEMIA DUE TO CHRONIC BLOOD LOSS: Primary | ICD-10-CM

## 2024-11-19 NOTE — PROGRESS NOTES
CLINICAL CLEARANCE FOR OUTPATIENT INFUSION      Patient to be scheduled for Feraheme (Ferumoxytol) infusions.     For Diagnosis: Iron Deficiency Anemia    Urine Hcg test ordered prior to first infusion?  Yes (If female pt <60 years of age and with reproductive capability assure order in place)  (If not ordered and is indicated please place order)    Review of Labs:  Lab Results   Component Value Date    HGB 9.7 (L) 10/29/2024    FERRITIN 26 10/29/2024    IRON 12 (L) 10/29/2024    TIBC 326 10/29/2024    MCHC 30.3 (L) 10/29/2024    MCV 80 10/29/2024    MCH 24.3 (L) 10/29/2024      Lab Results   Component Value Date    IRONSAT 4 (L) 10/29/2024        Do labs confirm diagnosis of iron deficiency? Yes    (If NO please reach out to prescribing provider prior to proceeding)      Okay to schedule for infusion as ordered by prescribing provider.

## 2024-11-20 ENCOUNTER — APPOINTMENT (OUTPATIENT)
Dept: PRIMARY CARE | Facility: CLINIC | Age: 24
End: 2024-11-20
Payer: COMMERCIAL

## 2024-11-20 ENCOUNTER — INFUSION (OUTPATIENT)
Dept: INFUSION THERAPY | Facility: CLINIC | Age: 24
End: 2024-11-20
Payer: COMMERCIAL

## 2024-11-20 VITALS
HEART RATE: 65 BPM | TEMPERATURE: 97.3 F | RESPIRATION RATE: 17 BRPM | DIASTOLIC BLOOD PRESSURE: 67 MMHG | OXYGEN SATURATION: 98 % | SYSTOLIC BLOOD PRESSURE: 107 MMHG

## 2024-11-20 DIAGNOSIS — K51.911 ULCERATIVE COLITIS WITH RECTAL BLEEDING, UNSPECIFIED LOCATION (MULTI): ICD-10-CM

## 2024-11-20 DIAGNOSIS — D50.0 IRON DEFICIENCY ANEMIA DUE TO CHRONIC BLOOD LOSS: ICD-10-CM

## 2024-11-20 DIAGNOSIS — K51.219 ULCERATIVE PROCTITIS WITH COMPLICATION (MULTI): ICD-10-CM

## 2024-11-20 PROCEDURE — 96365 THER/PROPH/DIAG IV INF INIT: CPT | Performed by: NURSE PRACTITIONER

## 2024-11-20 RX ORDER — ALBUTEROL SULFATE 0.83 MG/ML
3 SOLUTION RESPIRATORY (INHALATION) AS NEEDED
OUTPATIENT
Start: 2024-11-27

## 2024-11-20 RX ORDER — DIPHENHYDRAMINE HYDROCHLORIDE 50 MG/ML
50 INJECTION INTRAMUSCULAR; INTRAVENOUS AS NEEDED
OUTPATIENT
Start: 2024-11-27

## 2024-11-20 RX ORDER — EPINEPHRINE 0.3 MG/.3ML
0.3 INJECTION SUBCUTANEOUS EVERY 5 MIN PRN
OUTPATIENT
Start: 2024-12-18

## 2024-11-20 RX ORDER — EPINEPHRINE 0.3 MG/.3ML
0.3 INJECTION SUBCUTANEOUS EVERY 5 MIN PRN
OUTPATIENT
Start: 2024-11-27

## 2024-11-20 RX ORDER — DIPHENHYDRAMINE HYDROCHLORIDE 50 MG/ML
50 INJECTION INTRAMUSCULAR; INTRAVENOUS AS NEEDED
OUTPATIENT
Start: 2024-12-18

## 2024-11-20 RX ORDER — FAMOTIDINE 10 MG/ML
20 INJECTION INTRAVENOUS ONCE AS NEEDED
OUTPATIENT
Start: 2024-11-27

## 2024-11-20 RX ORDER — FAMOTIDINE 10 MG/ML
20 INJECTION INTRAVENOUS ONCE AS NEEDED
OUTPATIENT
Start: 2024-12-18

## 2024-11-20 RX ORDER — ALBUTEROL SULFATE 0.83 MG/ML
3 SOLUTION RESPIRATORY (INHALATION) AS NEEDED
OUTPATIENT
Start: 2024-12-18

## 2024-11-20 ASSESSMENT — ENCOUNTER SYMPTOMS
WOUND: 0
DYSURIA: 0
WHEEZING: 0
NERVOUS/ANXIOUS: 0
HEADACHES: 0
ABDOMINAL PAIN: 0
MYALGIAS: 0
FEVER: 0
BLOOD IN STOOL: 0
COUGH: 0
DIZZINESS: 0
TROUBLE SWALLOWING: 0
UNEXPECTED WEIGHT CHANGE: 0
CHILLS: 0
LEG SWELLING: 0
ARTHRALGIAS: 0
FREQUENCY: 0
DIARRHEA: 1
LIGHT-HEADEDNESS: 0
APPETITE CHANGE: 0
VOMITING: 0
EYE PROBLEMS: 0
EXTREMITY WEAKNESS: 0
DEPRESSION: 0
PALPITATIONS: 0
SHORTNESS OF BREATH: 0
VOICE CHANGE: 0
NAUSEA: 0
NUMBNESS: 0
HEMATURIA: 0
FATIGUE: 0
CONSTIPATION: 0
SORE THROAT: 0

## 2024-11-20 NOTE — PROGRESS NOTES
OFFICE VISIT      Patient: Clark Valerio Date of Service: 2022   : 2002 MRN: [id number]     SUBJECTIVE:   HISTORY OF PRESENT ILLNESS:  Clark Valerio is a 19 year old female who presents today for       Last 2 weeks patient's eczema is flaring up with itching rash. Pain with washing.   Itching Stuffy nose  Benadryl is not helping  No fever  H/o eczema/asthma (doing well and doesn't need albuterol refills)/allergic rhinitis  No fever/sob/chest pain/wheezing/coughing          PAST MEDICAL HISTORY:  Past Medical History:   Diagnosis Date   • Acne vulgaris    • Allergy    • Asthma    • Carbuncle    • Eczema    • Eczema    • Issue of incapacity certificate    • Multiple environmental allergies        MEDICATIONS:  Current Outpatient Medications   Medication Sig   • cetirizine (ZyrTEC Allergy) 10 MG tablet Take 1 tablet by mouth daily.   • mupirocin (BACTROBAN) 2 % ointment Apply topically 3 times daily.   • triamcinolone (KENALOG) 0.025 % ointment Apply small amount to affected area twice daily as needed for eczema or dermatitist   • EPINEPHrine (Auvi-Q) 0.3 MG/0.3ML auto-injector Inject 0.3 mLs into the muscle 1 time as needed for Anaphylaxis.   • fluticasone propionate (Flovent HFA) 110 MCG/ACT inhaler Inhale 2 puff twice daily for 2 weeks in lungs then 1 puff twice daily for 2 weeks then stop. Rinse mouth after use.   • albuterol (PROAIR RESPICLICK) 108 (90 Base) MCG/ACT inhaler Inhale 2 puffs into the lungs every 4 hours as needed for Shortness of Breath.   • Spacer/Aero-Holding Chambers (E-Z Spacer) Device Use with inhaler   • albuterol (PROAIR HFA) 108 (90 Base) MCG/ACT inhaler      No current facility-administered medications for this visit.       ALLERGIES:  ALLERGIES:   Allergen Reactions   • Cat Dander Other (See Comments)   • Nuts   (Food) Other (See Comments)       PAST SURGICAL HISTORY:  Past Surgical History:   Procedure Laterality Date   • No past surgeries         FAMILY HISTORY:  Family History  [facility]   Infusion Clinic Note   Date: 2024   Name: Pastora Rawls  : 2000   MRN: [id number]          Reason for Visit: Follow-up and OP Infusion (PT HERE FOR ENTYVIO 300MG AND FERAHEME 510MG/NEXT APT: 28 DAYS FOR THE ENTYVIO AND 7 DAYS FOR DOSE 2/2 OF FERAHEME )         Today: We administered vedolizumab (Entyvio) 300 mg in sodium chloride 0.9% 255 mL IV and ferumoxytol (Feraheme) 510 mg in sodium chloride 0.9% 117 mL IV.       Visit Type: INFUSION       Ordered By: DR. MCKEON       Accompanied by:GRANDMOTHER       Diagnosis: Ulcerative proctitis with complication (Multi)    Iron deficiency anemia due to chronic blood loss    Ulcerative colitis with rectal bleeding, unspecified location (Multi)        Allergies:   Allergies as of 2024 - Reviewed 2024   Allergen Reaction Noted    Aspirin Other 2024    Gelatin Itching 10/05/2023    Nsaids (non-steroidal anti-inflammatory drug) Itching 10/05/2023          Current Medications has a current medication list which includes the following prescription(s): budesonide er, hydrocortisone, ferrous sulfate (325 mg ferrous sulfate), and vedolizumab.       Vitals:   Vitals:    24 1327 24 1347 24 1430   BP: 118/74 99/65 107/67   Pulse: 94 65 65   Resp: 17 17 17   Temp: 36.4 °C (97.5 °F) 36.3 °C (97.3 °F) 36.3 °C (97.3 °F)   SpO2: 98% 97% 98%               Infusion Pre-procedure Checklist:   - Allergies reviewed: yes   - Medications reviewed: yes       - Previous reaction to current treatment: no      Assess patient for the concerns below. Document provider notification as appropriate.  - Active or recent infection with/without current antibiotic use: no  - Recent or planned invasive dental work: no  - Recent or planned surgeries: no  - Recently received or plans to receive vaccinations: no  - Has treatment related toxicities: yes- BACK PAIN ONE WEEK POST INFUSION  - Is pregnant:  no      Pain:    Problem Relation Age of Onset   • Eczema Father    • Asthma Father    • Congestive Heart Failure Father    • Asthma Sister         allergies   • Asthma Paternal Grandmother        SOCIAL HISTORY:  Social History     Tobacco Use   • Smoking status: Never Smoker   • Smokeless tobacco: Never Used   Substance Use Topics   • Alcohol use: Yes     Comment: Occasional   • Drug use: Never       Review of Systems   Constitutional: Negative.    HENT: Positive for congestion and rhinorrhea.    Eyes: Negative.    Respiratory: Negative.    Cardiovascular: Negative.    Gastrointestinal: Negative.    Endocrine: Negative.    Genitourinary: Negative.    Musculoskeletal: Negative.    Skin: Positive for rash and wound.   Allergic/Immunologic: Negative.    Neurological: Negative.    Hematological: Negative.    Psychiatric/Behavioral: Negative.    All other systems reviewed and are negative.        OBJECTIVE:     Physical Exam  Vitals and nursing note reviewed.   Constitutional:       General: She is not in acute distress.     Appearance: She is not ill-appearing, toxic-appearing or diaphoretic.   HENT:      Head: Normocephalic and atraumatic.      Right Ear: External ear normal.      Left Ear: External ear normal.      Nose: Congestion and rhinorrhea present.      Comments: Edematous and pale nasal turbinates     Mouth/Throat:      Mouth: Mucous membranes are moist.      Pharynx: No oropharyngeal exudate or posterior oropharyngeal erythema.   Eyes:      General: No scleral icterus.        Right eye: No discharge.         Left eye: No discharge.      Extraocular Movements: Extraocular movements intact.      Conjunctiva/sclera: Conjunctivae normal.      Pupils: Pupils are equal, round, and reactive to light.   Neck:      Thyroid: No thyromegaly.   Cardiovascular:      Rate and Rhythm: Normal rate and regular rhythm.      Pulses: Normal pulses.      Heart sounds: Normal heart sounds. No murmur heard.  Pulmonary:      Effort: Pulmonary  0   - Is the pain different from normal: n/a   - Is your Doctor aware:  n/a       Labs: Labs drawn and sent per order          Fall Risk Screening: Chrissy Fall Risk  History of Falling, Immediate or Within 3 Months: No  Secondary Diagnosis: No  Ambulatory Aid: Walks without aid/bedrest/nurse assist  Intravenous Therapy/Heparin Lock: No  Gait/Transferring: Normal/bedrest/immobile       Review Of Systems:  Review of Systems   Constitutional:  Negative for appetite change, chills, fatigue, fever and unexpected weight change.   HENT:   Negative for hearing loss, mouth sores, sore throat, tinnitus, trouble swallowing and voice change.    Eyes:  Negative for eye problems.   Respiratory:  Negative for cough, shortness of breath and wheezing.    Cardiovascular:  Negative for chest pain, leg swelling and palpitations.   Gastrointestinal:  Positive for diarrhea. Negative for abdominal pain, blood in stool, constipation, nausea and vomiting.        PT WITH A DX OF IBD REPORTS #  2-4 LOOSE BM'S PER DAY.   DENIES PAIN, MUCUS OR BLOOD WITH BMS.      Genitourinary:  Negative for dysuria, frequency and hematuria.    Musculoskeletal:  Negative for arthralgias and myalgias.   Skin:  Positive for rash. Negative for itching and wound.        UNDER BREASTS COMES AND GOES   Neurological:  Negative for dizziness, extremity weakness, headaches, light-headedness and numbness.   Psychiatric/Behavioral:  Negative for depression. The patient is not nervous/anxious.          ROS completed? Yes      Infusion Readiness:  - Assessment Concerns Related to Infusion: No  - Provider notified: n/a      Document Below Only If Indicated:   New Patient Education:    N/A (returning patient for continuation of therapy. Ongoing education provided as needed.)        Treatment Conditions & Drug Specific Questions:    Vedolizumab  (ENTYVIO)    (Unless otherwise specified on patient specific therapy plan):     TREATMENT CONDITIONS:  Unless otherwise specified on  "patient specific therapy plan HOLD and notify provider prior to proceeding with treatment if:   o Positive Hepatitis B Surface Ag  o Positive T-Spot  o Patient has signs or symptoms of Progressive Multifocal Leukoencephalopath (PML)     Lab Results   Component Value Date    HEPBSAG Nonreactive 02/20/2024      No results found for: \"NONUHFIRE\", \"NONUHSWGH\", \"NONUHFISH\", \"EXTHEPBSAG\"  Lab Results   Component Value Date    TBSIN Negative 02/09/2024      Lab Results   Component Value Date    ALT 9 02/09/2024    AST 12 02/09/2024    ALKPHOS 216 (H) 02/09/2024    BILITOT 0.2 02/09/2024        Labs reviewed and patient meets treatment conditions? Yes    DRUG SPECIFIC QUESTIONS:  Any signs or symptoms of Progressive Multifocal Leukoencephalopath (PML) which may include: memory loss, trouble thinking, loss of balance, difficulty talking or walking, loss of vision?  No    (If YES notify prescribing provider prior to proceeding)    REMINDERS:  Recommended Vitals/Observation:  Vitals: Monitor vitals at start of infusion, at 15 minutes and at completion of infusion.  Observation: First 3 infusions patient may leave 15 minutes post infusion. Subsequent maintenance infusions: if no reaction, may leave immediately post infusion.      (Unless otherwise specified on patient specific therapy plan):    REMINDERS:  May cause hypotension, patient should be in a reclined or semi-reclined position during infusion.    Ferumoxytol can interfere with MR imaging and Radiology should be notified if a patient has received ferumoxytol within 3 months of the anticipated MR.    Recommended Vitals/Observation:  Vitals: Obtain vital signs before, immediately following infusion, and 30 minutes after completion of infusion.  Observation: 30 minutes after each infusion.     Lab Results   Component Value Date    IRON 12 (L) 10/29/2024    TIBC 326 10/29/2024    FERRITIN 26 10/29/2024      Lab Results   Component Value Date    IRONSAT 4 (L) 10/29/2024    " effort is normal. No respiratory distress.      Breath sounds: Normal breath sounds. No wheezing or rales.   Chest:      Chest wall: No tenderness.   Abdominal:      General: Bowel sounds are normal. There is no distension.      Palpations: Abdomen is soft.      Tenderness: There is no abdominal tenderness. There is no right CVA tenderness, left CVA tenderness, guarding or rebound.   Musculoskeletal:         General: No swelling or tenderness. Normal range of motion.      Cervical back: Normal range of motion and neck supple. No rigidity or tenderness.      Right lower leg: No edema.      Left lower leg: No edema.   Lymphadenopathy:      Cervical: No cervical adenopathy.   Skin:     General: Skin is warm.      Coloration: Skin is not pale.      Findings: Lesion and rash present. No erythema.      Comments: Thick erythematous patches on extremities, especially on antecubital area and dorsal foot  Skin fissure on left dorsal foot   Neurological:      Mental Status: She is alert and oriented to person, place, and time.      Cranial Nerves: No cranial nerve deficit.      Motor: No abnormal muscle tone.      Coordination: Coordination normal.      Gait: Gait is intact.   Psychiatric:         Mood and Affect: Mood and affect normal.         Visit Vitals  /67   Pulse 69   Temp 98.5 °F (36.9 °C)   Resp 18   Ht 5' 2\" (1.575 m)   Wt 53.4 kg (117 lb 9.9 oz)   LMP 08/16/2022   SpO2 100%   BMI 21.51 kg/m²       DIAGNOSTIC STUDIES:   LAB RESULTS:    No results found for this visit on 08/20/22.    Assessment AND PLAN:   This is a 19 year old year-old female who presents with     .    1. Eczema, unspecified type    2. Allergic rhinitis, unspecified seasonality, unspecified trigger    3. Skin fissures    4. History of asthma    5. Pruritus    6. Chronic dermatitis      Please take medications as directed.  Supportive care including skin care and monitoring, and Follow up with your PCp and  dermatitis for further management        Instructions provided as documented in the AVS.          The patient indicated understanding of the diagnosis and agreed with the plan of care.         Lab Results   Component Value Date    WBC 5.2 10/29/2024    HGB 9.7 (L) 10/29/2024    HCT 32.0 (L) 10/29/2024    MCV 80 10/29/2024     10/29/2024             Weight Based Drug Calculations:    WEIGHT BASED DRUGS: NOT APPLICABLE / FLAT DOSE          Initiated By: Storm aBird RN

## 2024-11-20 NOTE — PATIENT INSTRUCTIONS
Today :We administered ferumoxytol (Feraheme) 510 mg in sodium chloride 0.9% 117 mL IV.     For:   1. Ulcerative proctitis with complication (Multi)    2. Iron deficiency anemia due to chronic blood loss    3. Ulcerative colitis with rectal bleeding, unspecified location (Multi)         Your next appointment is due in:  28 DAYS FOR ENTYVIO AND 7 DAYS FOR FERAHEME         Please read the  Medication Guide that was given to you and reviewed during todays visit.     (Tell all doctors including dentists that you are taking this medication)     Go to the emergency room or call 911 if:  -You have signs of allergic reaction:   -Rash, hives, itching.   -Swollen, blistered, peeling skin.   -Swelling of face, lips, mouth, tongue or throat.   -Tightness of chest, trouble breathing, swallowing or talking     Call your doctor:  - If IV / injection site gets red, warm, swollen, itchy or leaks fluid or pus.     (Leave dressing on your IV site for at least 2 hours and keep area clean and dry  - If you get sick or have symptoms of infection or are not feeling well for any reason.    (Wash your hands often, stay away from people who are sick)  - If you have side effects from your medication that do not go away or are bothersome.     (Refer to the teaching your nurse gave you for side effects to call your doctor about)    - Common side effects may include:  stuffy nose, headache, feeling tired, muscle aches, upset stomach  - Before receiving any vaccines     - [facility] at   If:  - You get sick, are on antibiotics, have had a recent vaccine, have surgery or dental work and your doctor wants your visit rescheduled.  - You need to cancel and reschedule your visit for any reason. Call at least 2 days before your visit if you need to cancel.   - Your insurance changes before your next visit.    (We will need to get approval from your new insurance. This can take up to two weeks.)     [facility] is opened Monday thru Friday. We are closed on weekends and holidays.   Voice mail will take your call if the center is closed. If you leave a message please allow 24 hours for a call back during weekdays. If you leave a message on a weekend/holiday, we will call you back the next business day.    A pharmacist is available Monday - Friday from 8:30AM to 3:30PM to help answer any questions you may have about your prescriptions(s). Please call pharmacy at:    [place]: [phone]  [facility]: [phone]  [facility]: [phone]

## 2024-11-26 RX ORDER — FERUMOXYTOL 510 MG/17ML
510 INJECTION INTRAVENOUS SEE ADMIN INSTRUCTIONS
Qty: 17 ML | Refills: 1 | Status: SHIPPED
Start: 2024-11-26

## 2024-11-27 ENCOUNTER — APPOINTMENT (OUTPATIENT)
Dept: INFUSION THERAPY | Facility: CLINIC | Age: 24
End: 2024-11-27
Payer: COMMERCIAL

## 2024-11-27 VITALS
SYSTOLIC BLOOD PRESSURE: 96 MMHG | WEIGHT: 152.34 LBS | OXYGEN SATURATION: 99 % | DIASTOLIC BLOOD PRESSURE: 64 MMHG | TEMPERATURE: 98.4 F | BODY MASS INDEX: 26.15 KG/M2 | RESPIRATION RATE: 16 BRPM | HEART RATE: 85 BPM

## 2024-11-27 DIAGNOSIS — K51.911 ULCERATIVE COLITIS WITH RECTAL BLEEDING, UNSPECIFIED LOCATION (MULTI): ICD-10-CM

## 2024-11-27 DIAGNOSIS — D50.0 IRON DEFICIENCY ANEMIA DUE TO CHRONIC BLOOD LOSS: ICD-10-CM

## 2024-11-27 RX ORDER — ALBUTEROL SULFATE 0.83 MG/ML
3 SOLUTION RESPIRATORY (INHALATION) AS NEEDED
OUTPATIENT
Start: 2024-12-04

## 2024-11-27 RX ORDER — EPINEPHRINE 0.3 MG/.3ML
0.3 INJECTION SUBCUTANEOUS EVERY 5 MIN PRN
OUTPATIENT
Start: 2024-12-04

## 2024-11-27 RX ORDER — FAMOTIDINE 10 MG/ML
20 INJECTION INTRAVENOUS ONCE AS NEEDED
OUTPATIENT
Start: 2024-12-04

## 2024-11-27 RX ORDER — DIPHENHYDRAMINE HYDROCHLORIDE 50 MG/ML
50 INJECTION INTRAMUSCULAR; INTRAVENOUS AS NEEDED
OUTPATIENT
Start: 2024-12-04

## 2024-11-27 ASSESSMENT — ENCOUNTER SYMPTOMS
BLOOD IN STOOL: 0
HEADACHES: 0
EXTREMITY WEAKNESS: 0
LEG SWELLING: 0
NUMBNESS: 0
DIZZINESS: 0
APPETITE CHANGE: 0
HEMATURIA: 0
FATIGUE: 0
PALPITATIONS: 0
CONSTIPATION: 0
CHILLS: 0
DYSURIA: 0
ARTHRALGIAS: 0
SORE THROAT: 0
WHEEZING: 0
ABDOMINAL PAIN: 0
TROUBLE SWALLOWING: 0
COUGH: 0
DIARRHEA: 0
WOUND: 0
NAUSEA: 1
VOICE CHANGE: 0
FREQUENCY: 0
MYALGIAS: 0
VOMITING: 0
SHORTNESS OF BREATH: 0
LIGHT-HEADEDNESS: 0
UNEXPECTED WEIGHT CHANGE: 0
EYE PROBLEMS: 0
FEVER: 0

## 2024-11-27 NOTE — PROGRESS NOTES
[facility]   Infusion Clinic Note   Date: 2024   Name: Pastora Rawls  : 2000   MRN: [id number]          Reason for Visit: OP Infusion (PT HERE FOR DOSE 2/2 FERAHEME 510 MG INFUSION)         Today: We administered ferumoxytol (Feraheme) 510 mg in sodium chloride 0.9% 117 mL IV.       Visit Type: INFUSION       Ordered By: DR. MCKEON       Accompanied by:Relative GRANDMOTHER        Diagnosis: Iron deficiency anemia due to chronic blood loss    Ulcerative colitis with rectal bleeding, unspecified location (Multi)        Allergies:   Allergies as of 2024 - Reviewed 2024   Allergen Reaction Noted    Aspirin Other 2024    Gelatin Itching 10/05/2023    Nsaids (non-steroidal anti-inflammatory drug) Itching 10/05/2023          Current Medications has a current medication list which includes the following prescription(s): budesonide er, ferrous sulfate (325 mg ferrous sulfate), ferumoxytol, hydrocortisone, and vedolizumab.       Vitals:   Vitals:    24 1606 24 1655   BP: 110/72 96/64   Pulse: 91 85   Resp: 16 16   Temp: 36.1 °C (97 °F) 36.9 °C (98.4 °F)   SpO2: 97% 99%   Weight: 69.1 kg (152 lb 5.4 oz)              Infusion Pre-procedure Checklist:   - Allergies reviewed: yes   - Medications reviewed: yes       - Previous reaction to current treatment: no      Assess patient for the concerns below. Document provider notification as appropriate.  - Active or recent infection with/without current antibiotic use: no  - Recent or planned invasive dental work: no  - Recent or planned surgeries: no  - Recently received or plans to receive vaccinations: no  - Has treatment related toxicities: no  - Is pregnant:  no      Pain: 0   - Is the pain different from normal: n/a   - Is your Doctor aware:  n/a       Labs: N/A          Fall Risk Screening: Chrissy Fall Risk  History of Falling, Immediate or Within 3 Months: No  Secondary Diagnosis: No  Ambulatory  Aid: Walks without aid/bedrest/nurse assist  Intravenous Therapy/Heparin Lock: Yes  Gait/Transferring: Normal/bedrest/immobile  Mental Status: Oriented to own ability  Lowe Fall Risk Score: 20       Review Of Systems:  Review of Systems   Constitutional:  Negative for appetite change, chills, fatigue, fever and unexpected weight change.   HENT:   Negative for hearing loss, mouth sores, sore throat, tinnitus, trouble swallowing and voice change.    Eyes:  Negative for eye problems.   Respiratory:  Negative for cough, shortness of breath and wheezing.    Cardiovascular:  Negative for chest pain, leg swelling and palpitations.   Gastrointestinal:  Positive for nausea. Negative for abdominal pain, blood in stool, constipation, diarrhea and vomiting.        INTERMITTENT NAUSEA AT BASELINE.    Genitourinary:  Negative for dysuria, frequency and hematuria.    Musculoskeletal:  Negative for arthralgias and myalgias.   Skin:  Negative for itching, rash and wound.   Neurological:  Negative for dizziness, extremity weakness, headaches, light-headedness and numbness.         ROS completed? Yes      Infusion Readiness:  - Assessment Concerns Related to Infusion: No  - Provider notified: n/a      Document Below Only If Indicated:   New Patient Education:    N/A (returning patient for continuation of therapy. Ongoing education provided as needed.)        Treatment Conditions & Drug Specific Questions:    Ferumoxytol  (FERAHEME)    (Unless otherwise specified on patient specific therapy plan):    REMINDERS:  May cause hypotension, patient should be in a reclined or semi-reclined position during infusion.    Ferumoxytol can interfere with MR imaging and Radiology should be notified if a patient has received ferumoxytol within 3 months of the anticipated MR.    Recommended Vitals/Observation:  Vitals: Obtain vital signs before, immediately following infusion, and 30 minutes after completion of infusion.  Observation: 30 minutes  after each infusion.     Lab Results   Component Value Date    IRON 12 (L) 10/29/2024    TIBC 326 10/29/2024    FERRITIN 26 10/29/2024      Lab Results   Component Value Date    IRONSAT 4 (L) 10/29/2024      Lab Results   Component Value Date    WBC 5.2 10/29/2024    HGB 9.7 (L) 10/29/2024    HCT 32.0 (L) 10/29/2024    MCV 80 10/29/2024     10/29/2024            Weight Based Drug Calculations:    WEIGHT BASED DRUGS: NOT APPLICABLE / FLAT DOSE          Initiated By: Coral Weber, RN

## 2024-11-27 NOTE — PATIENT INSTRUCTIONS
Today :We administered ferumoxytol (Feraheme) 510 mg in sodium chloride 0.9% 117 mL IV.     For:   1. Iron deficiency anemia due to chronic blood loss    2. Ulcerative colitis with rectal bleeding, unspecified location (Multi)         Your next appointment is due in:  TO BE DECIDED        Please read the  Medication Guide that was given to you and reviewed during todays visit.     (Tell all doctors including dentists that you are taking this medication)     Go to the emergency room or call 911 if:  -You have signs of allergic reaction:   -Rash, hives, itching.   -Swollen, blistered, peeling skin.   -Swelling of face, lips, mouth, tongue or throat.   -Tightness of chest, trouble breathing, swallowing or talking     Call your doctor:  - If IV / injection site gets red, warm, swollen, itchy or leaks fluid or pus.     (Leave dressing on your IV site for at least 2 hours and keep area clean and dry  - If you get sick or have symptoms of infection or are not feeling well for any reason.    (Wash your hands often, stay away from people who are sick)  - If you have side effects from your medication that do not go away or are bothersome.     (Refer to the teaching your nurse gave you for side effects to call your doctor about)    - Common side effects may include:  stuffy nose, headache, feeling tired, muscle aches, upset stomach  - Before receiving any vaccines     - Call the [facility] at   If:  - You get sick, are on antibiotics, have had a recent vaccine, have surgery or dental work and your doctor wants your visit rescheduled.  - You need to cancel and reschedule your visit for any reason. Call at least 2 days before your visit if you need to cancel.   - Your insurance changes before your next visit.    (We will need to get approval from your new insurance. This can take up to two weeks.)     [facility] is opened Monday thru Friday. We are closed on weekends and holidays.   Voice  mail will take your call if the center is closed. If you leave a message please allow 24 hours for a call back during weekdays. If you leave a message on a weekend/holiday, we will call you back the next business day.    A pharmacist is available Monday - Friday from 8:30AM to 3:30PM to help answer any questions you may have about your prescriptions(s). Please call pharmacy at:    [facility]: [phone]  [facility]: [phone]  [facility]: [phone]

## 2024-12-18 ENCOUNTER — APPOINTMENT (OUTPATIENT)
Dept: INFUSION THERAPY | Facility: CLINIC | Age: 24
End: 2024-12-18
Payer: COMMERCIAL

## 2024-12-18 VITALS
DIASTOLIC BLOOD PRESSURE: 61 MMHG | RESPIRATION RATE: 16 BRPM | BODY MASS INDEX: 25.69 KG/M2 | TEMPERATURE: 98.2 F | WEIGHT: 149.69 LBS | SYSTOLIC BLOOD PRESSURE: 91 MMHG | HEART RATE: 101 BPM | OXYGEN SATURATION: 97 %

## 2024-12-18 DIAGNOSIS — K51.919 ULCERATIVE COLITIS WITH COMPLICATION, UNSPECIFIED LOCATION (MULTI): ICD-10-CM

## 2024-12-18 DIAGNOSIS — K51.219 ULCERATIVE PROCTITIS WITH COMPLICATION (MULTI): ICD-10-CM

## 2024-12-18 PROCEDURE — 96365 THER/PROPH/DIAG IV INF INIT: CPT | Performed by: NURSE PRACTITIONER

## 2024-12-18 RX ORDER — ALBUTEROL SULFATE 0.83 MG/ML
3 SOLUTION RESPIRATORY (INHALATION) AS NEEDED
OUTPATIENT
Start: 2025-01-15

## 2024-12-18 RX ORDER — DIPHENHYDRAMINE HYDROCHLORIDE 50 MG/ML
50 INJECTION INTRAMUSCULAR; INTRAVENOUS AS NEEDED
OUTPATIENT
Start: 2025-01-15

## 2024-12-18 RX ORDER — FAMOTIDINE 10 MG/ML
20 INJECTION INTRAVENOUS ONCE AS NEEDED
OUTPATIENT
Start: 2025-01-15

## 2024-12-18 RX ORDER — BUDESONIDE 9 MG/1
9 TABLET, FILM COATED, EXTENDED RELEASE ORAL DAILY
Qty: 30 TABLET | Refills: 0 | Status: SHIPPED | OUTPATIENT
Start: 2024-12-18 | End: 2025-02-16

## 2024-12-18 RX ORDER — EPINEPHRINE 0.3 MG/.3ML
0.3 INJECTION SUBCUTANEOUS EVERY 5 MIN PRN
OUTPATIENT
Start: 2025-01-15

## 2024-12-18 ASSESSMENT — ENCOUNTER SYMPTOMS
CONSTIPATION: 0
DIARRHEA: 0
BLOOD IN STOOL: 0
SHORTNESS OF BREATH: 0
EYE PROBLEMS: 0
ABDOMINAL PAIN: 0
FATIGUE: 0
FEVER: 0
LEG SWELLING: 0
COUGH: 0
SORE THROAT: 0
TROUBLE SWALLOWING: 0
VOMITING: 0
NAUSEA: 1
PALPITATIONS: 0
APPETITE CHANGE: 0

## 2024-12-18 NOTE — PATIENT INSTRUCTIONS
Today :We administered vedolizumab (Entyvio) 300 mg in sodium chloride 0.9% 255 mL IV.     For:   1. Ulcerative proctitis with complication (Multi)         Your next appointment is due in: 28 DAYS     Please read the  Medication Guide that was given to you and reviewed during todays visit.     (Tell all doctors including dentists that you are taking this medication)     Go to the emergency room or call 911 if:  -You have signs of allergic reaction:   -Rash, hives, itching.   -Swollen, blistered, peeling skin.   -Swelling of face, lips, mouth, tongue or throat.   -Tightness of chest, trouble breathing, swallowing or talking     Call your doctor:  - If IV / injection site gets red, warm, swollen, itchy or leaks fluid or pus.     (Leave dressing on your IV site for at least 2 hours and keep area clean and dry  - If you get sick or have symptoms of infection or are not feeling well for any reason.    (Wash your hands often, stay away from people who are sick)  - If you have side effects from your medication that do not go away or are bothersome.     (Refer to the teaching your nurse gave you for side effects to call your doctor about)    - Common side effects may include:  stuffy nose, headache, feeling tired, muscle aches, upset stomach  - Before receiving any vaccines     - Call [facility] at   If:  - You get sick, are on antibiotics, have had a recent vaccine, have surgery or dental work and your doctor wants your visit rescheduled.  - You need to cancel and reschedule your visit for any reason. Call at least 2 days before your visit if you need to cancel.   - Your insurance changes before your next visit.    (We will need to get approval from your new insurance. This can take up to two weeks.)     [facility] is opened Monday thru Friday. We are closed on weekends and holidays.   Voice mail will take your call if the center is closed. If you leave a message please allow 24  hours for a call back during weekdays. If you leave a message on a weekend/holiday, we will call you back the next business day.    A pharmacist is available Monday - Friday from 8:30AM to 3:30PM to help answer any questions you may have about your prescriptions(s). Please call pharmacy at:    [facility]: [phone]  [facility]: [phone]  [facility]: [phone]

## 2024-12-18 NOTE — PROGRESS NOTES
[facility]   Infusion Clinic Note   Date: 2024   Name: Pasotra Rawls  : 2000   MRN: [id number]          Reason for Visit: OP Infusion (PT. HERE FOR Q 28 DAY ENTYVIO 300 MG IV INFUSION.)         Today: We administered vedolizumab (Entyvio) 300 mg in sodium chloride 0.9% 255 mL IV.       Visit Type: INFUSION       Ordered By: DR. MCKEON       Accompanied by: GRANDMOTHER       Diagnosis: Ulcerative proctitis with complication (Multi)        Allergies:   Allergies as of 2024 - Reviewed 2024   Allergen Reaction Noted   • Aspirin Other 2024   • Gelatin Itching 10/05/2023   • Nsaids (non-steroidal anti-inflammatory drug) Itching 10/05/2023          Current Medications has a current medication list which includes the following prescription(s): budesonide er, hydrocortisone, vedolizumab, ferrous sulfate (325 mg ferrous sulfate), and ferumoxytol, and the following Facility-Administered Medications: vedolizumab (Entyvio) 300 mg in sodium chloride 0.9% 255 mL IV.       Vitals:   Vitals:    24 1405 24 1539   BP: 118/69 117/73   Pulse: 104  Comment: nurse notified 94   Resp: 18 18   Temp: 36.5 °C (97.7 °F) 36.7 °C (98.1 °F)   SpO2: 98% 98%   Weight: 67.9 kg (149 lb 11.1 oz)              Infusion Pre-procedure Checklist:   - Allergies reviewed: yes   - Medications reviewed: yes       - Previous reaction to current treatment: no, PT. DOES ADMITS TO INTERMITTENT RASH ON LEFT NECK, MILD HEADACHES AND INTERMITTENT NAUSEA. PT. IS NOT SURE IF IT IS FROM ENTYVIO, DR. MCKEON IS AWARE.       Assess patient for the concerns below. Document provider notification as appropriate.  - Active or recent infection with/without current antibiotic use: no  - Recent or planned invasive dental work: no  - Recent or planned surgeries: no  - Recently received or plans to receive vaccinations: no  - Has treatment related toxicities: no  - Is pregnant:  no      Pain: 0   - Is  the pain different from normal: n/a   - Is your Doctor aware:  n/a       Labs: N/A          Fall Risk Screening: Lowe Fall Risk  History of Falling, Immediate or Within 3 Months: No  Secondary Diagnosis: No  Ambulatory Aid: Walks without aid/bedrest/nurse assist  Intravenous Therapy/Heparin Lock: Yes  Gait/Transferring: Normal/bedrest/immobile  Mental Status: Oriented to own ability  Lowe Fall Risk Score: 20       Review Of Systems:  Review of Systems   Constitutional:  Negative for appetite change, fatigue and fever.        PT. STATES SHE NO LONGER HAS FATIGUE SINCE STARTING UCERIS 2 MONTHS AGO.   HENT:   Negative for hearing loss, mouth sores, sore throat and trouble swallowing.    Eyes:  Negative for eye problems.   Respiratory:  Negative for cough and shortness of breath.         HX OF CHILDHOOD ASTHMA, NONE CURRENTLY.   Cardiovascular:  Negative for chest pain, leg swelling and palpitations.   Gastrointestinal:  Positive for nausea. Negative for abdominal pain, blood in stool, constipation, diarrhea and vomiting.        PT. WITH ULCERATIVE COLITIS, DIAGNOSED IN 2009.  PT. ENTYVIO RECENTLY DECREASED TO Q 28 DAYS.  PT. ALSO STARTED ON UCERIS 2 MONTHS AGO.  PT. HAD A BAD DAY YESTERDAY WITH 7-8 LIQUID BM'S , SOME PAIN, H/A.   NOT SURE WHY.  PT. FEELS SHE HAS BEEN DOING BETTER IN LAST 2 MONTHS ,  DENIES BLOOD, MUCUS, PAIN  WITH OCCASIONAL NOCTURNAL STOOL.         ROS completed? Yes      Infusion Readiness:  - Assessment Concerns Related to Infusion: No  - Provider notified: n/a      Document Below Only If Indicated:   New Patient Education:    N/A (returning patient for continuation of therapy. Ongoing education provided as needed.)        Treatment Conditions & Drug Specific Questions:    Vedolizumab  (ENTYVIO)    (Unless otherwise specified on patient specific therapy plan):     TREATMENT CONDITIONS:  Unless otherwise specified on patient specific therapy plan HOLD and notify provider prior to proceeding with  "treatment if:   o Positive Hepatitis B Surface Ag  o Positive T-Spot  o Patient has signs or symptoms of Progressive Multifocal Leukoencephalopath (PML)     Lab Results   Component Value Date    HEPBSAG Nonreactive 02/20/2024      No results found for: \"NONUHFIRE\", \"NONUHSWGH\", \"NONUHFISH\", \"EXTHEPBSAG\"  Lab Results   Component Value Date    TBSIN Negative 02/09/2024      Lab Results   Component Value Date    ALT 9 02/09/2024    AST 12 02/09/2024    ALKPHOS 216 (H) 02/09/2024    BILITOT 0.2 02/09/2024        Labs reviewed and patient meets treatment conditions? Yes    DRUG SPECIFIC QUESTIONS:  Any signs or symptoms of Progressive Multifocal Leukoencephalopath (PML) which may include: memory loss, trouble thinking, loss of balance, difficulty talking or walking, loss of vision?  PT. FEELS SHE ALWAYS HAS MODERATE BRAIN FOG.    (If YES notify prescribing provider prior to proceeding)    REMINDERS:  Recommended Vitals/Observation:  Vitals: Monitor vitals at start of infusion, at 15 minutes and at completion of infusion.  Observation: First 3 infusions patient may leave 15 minutes post infusion. Subsequent maintenance infusions: if no reaction, may leave immediately post infusion.        Weight Based Drug Calculations:    WEIGHT BASED DRUGS: NOT APPLICABLE / FLAT DOSE          Initiated By: Jocelin Santamaria RN        "

## 2025-01-06 DIAGNOSIS — K51.919 ULCERATIVE COLITIS WITH COMPLICATION, UNSPECIFIED LOCATION (MULTI): Primary | ICD-10-CM

## 2025-01-06 DIAGNOSIS — D50.0 IRON DEFICIENCY ANEMIA DUE TO CHRONIC BLOOD LOSS: ICD-10-CM

## 2025-01-07 RX ORDER — PREDNISONE 10 MG/1
TABLET ORAL
Qty: 70 TABLET | Refills: 0 | Status: SHIPPED | OUTPATIENT
Start: 2025-01-07 | End: 2025-02-04

## 2025-01-15 ENCOUNTER — APPOINTMENT (OUTPATIENT)
Dept: INFUSION THERAPY | Facility: CLINIC | Age: 25
End: 2025-01-15
Payer: COMMERCIAL

## 2025-01-15 VITALS
RESPIRATION RATE: 16 BRPM | WEIGHT: 156.09 LBS | BODY MASS INDEX: 26.79 KG/M2 | TEMPERATURE: 97.6 F | OXYGEN SATURATION: 98 % | HEART RATE: 60 BPM | DIASTOLIC BLOOD PRESSURE: 67 MMHG | SYSTOLIC BLOOD PRESSURE: 102 MMHG

## 2025-01-15 DIAGNOSIS — D50.0 IRON DEFICIENCY ANEMIA DUE TO CHRONIC BLOOD LOSS: ICD-10-CM

## 2025-01-15 DIAGNOSIS — K51.219 ULCERATIVE PROCTITIS WITH COMPLICATION (MULTI): ICD-10-CM

## 2025-01-15 DIAGNOSIS — K51.919 ULCERATIVE COLITIS WITH COMPLICATION, UNSPECIFIED LOCATION (MULTI): ICD-10-CM

## 2025-01-15 LAB
ALBUMIN SERPL BCP-MCNC: 3.4 G/DL (ref 3.4–5)
ALP SERPL-CCNC: 116 U/L (ref 33–110)
ALT SERPL W P-5'-P-CCNC: 7 U/L (ref 7–45)
ANION GAP SERPL CALC-SCNC: 13 MMOL/L (ref 10–20)
AST SERPL W P-5'-P-CCNC: 7 U/L (ref 9–39)
BASOPHILS # BLD AUTO: 0.02 X10*3/UL (ref 0–0.1)
BASOPHILS NFR BLD AUTO: 0.2 %
BILIRUB SERPL-MCNC: 0.2 MG/DL (ref 0–1.2)
BUN SERPL-MCNC: 20 MG/DL (ref 6–23)
CALCIUM SERPL-MCNC: 8.9 MG/DL (ref 8.6–10.6)
CHLORIDE SERPL-SCNC: 107 MMOL/L (ref 98–107)
CO2 SERPL-SCNC: 25 MMOL/L (ref 21–32)
CREAT SERPL-MCNC: 0.47 MG/DL (ref 0.5–1.05)
CRP SERPL-MCNC: 1.69 MG/DL
EGFRCR SERPLBLD CKD-EPI 2021: >90 ML/MIN/1.73M*2
EOSINOPHIL # BLD AUTO: 0.01 X10*3/UL (ref 0–0.7)
EOSINOPHIL NFR BLD AUTO: 0.1 %
ERYTHROCYTE [DISTWIDTH] IN BLOOD BY AUTOMATED COUNT: 20.1 % (ref 11.5–14.5)
FERRITIN SERPL-MCNC: 95 NG/ML (ref 8–150)
GLUCOSE SERPL-MCNC: 106 MG/DL (ref 74–99)
HCT VFR BLD AUTO: 32.7 % (ref 36–46)
HGB BLD-MCNC: 10.2 G/DL (ref 12–16)
HYPOCHROMIA BLD QL SMEAR: NORMAL
IMM GRANULOCYTES # BLD AUTO: 0.11 X10*3/UL (ref 0–0.7)
IMM GRANULOCYTES NFR BLD AUTO: 1.2 % (ref 0–0.9)
IRON SATN MFR SERPL: 22 % (ref 25–45)
IRON SERPL-MCNC: 56 UG/DL (ref 35–150)
LYMPHOCYTES # BLD AUTO: 1.1 X10*3/UL (ref 1.2–4.8)
LYMPHOCYTES NFR BLD AUTO: 11.9 %
MCH RBC QN AUTO: 26.3 PG (ref 26–34)
MCHC RBC AUTO-ENTMCNC: 31.2 G/DL (ref 32–36)
MCV RBC AUTO: 84 FL (ref 80–100)
MONOCYTES # BLD AUTO: 0.46 X10*3/UL (ref 0.1–1)
MONOCYTES NFR BLD AUTO: 5 %
NEUTROPHILS # BLD AUTO: 7.51 X10*3/UL (ref 1.2–7.7)
NEUTROPHILS NFR BLD AUTO: 81.6 %
NRBC BLD-RTO: 0 /100 WBCS (ref 0–0)
OVALOCYTES BLD QL SMEAR: NORMAL
PLATELET # BLD AUTO: 433 X10*3/UL (ref 150–450)
POTASSIUM SERPL-SCNC: 3.3 MMOL/L (ref 3.5–5.3)
PROT SERPL-MCNC: 6 G/DL (ref 6.4–8.2)
RBC # BLD AUTO: 3.88 X10*6/UL (ref 4–5.2)
RBC MORPH BLD: NORMAL
SODIUM SERPL-SCNC: 142 MMOL/L (ref 136–145)
TIBC SERPL-MCNC: 254 UG/DL (ref 240–445)
UIBC SERPL-MCNC: 198 UG/DL (ref 110–370)
WBC # BLD AUTO: 9.2 X10*3/UL (ref 4.4–11.3)

## 2025-01-15 PROCEDURE — 80280 DRUG ASSAY VEDOLIZUMAB: CPT

## 2025-01-15 PROCEDURE — 85025 COMPLETE CBC W/AUTO DIFF WBC: CPT

## 2025-01-15 PROCEDURE — 82728 ASSAY OF FERRITIN: CPT

## 2025-01-15 PROCEDURE — 86481 TB AG RESPONSE T-CELL SUSP: CPT

## 2025-01-15 PROCEDURE — 83540 ASSAY OF IRON: CPT

## 2025-01-15 PROCEDURE — 96365 THER/PROPH/DIAG IV INF INIT: CPT | Performed by: NURSE PRACTITIONER

## 2025-01-15 PROCEDURE — 80053 COMPREHEN METABOLIC PANEL: CPT

## 2025-01-15 PROCEDURE — 83550 IRON BINDING TEST: CPT

## 2025-01-15 PROCEDURE — 86140 C-REACTIVE PROTEIN: CPT

## 2025-01-15 RX ORDER — EPINEPHRINE 0.3 MG/.3ML
0.3 INJECTION SUBCUTANEOUS EVERY 5 MIN PRN
OUTPATIENT
Start: 2025-02-12

## 2025-01-15 RX ORDER — ALBUTEROL SULFATE 0.83 MG/ML
3 SOLUTION RESPIRATORY (INHALATION) AS NEEDED
OUTPATIENT
Start: 2025-02-12

## 2025-01-15 RX ORDER — DIPHENHYDRAMINE HYDROCHLORIDE 50 MG/ML
50 INJECTION INTRAMUSCULAR; INTRAVENOUS AS NEEDED
OUTPATIENT
Start: 2025-02-12

## 2025-01-15 RX ORDER — FAMOTIDINE 10 MG/ML
20 INJECTION INTRAVENOUS ONCE AS NEEDED
OUTPATIENT
Start: 2025-02-12

## 2025-01-15 ASSESSMENT — ENCOUNTER SYMPTOMS
PALPITATIONS: 0
CHILLS: 0
ABDOMINAL PAIN: 1
BLOOD IN STOOL: 0
DYSURIA: 0
HEMATURIA: 0
NUMBNESS: 0
FEVER: 0
SHORTNESS OF BREATH: 0
EYE PROBLEMS: 0
SORE THROAT: 0
TROUBLE SWALLOWING: 0
FREQUENCY: 0
HEADACHES: 0
NAUSEA: 1
WOUND: 0
MYALGIAS: 0
LIGHT-HEADEDNESS: 0
LEG SWELLING: 0
ARTHRALGIAS: 0
DIZZINESS: 0
FATIGUE: 0
CONFUSION: 0
UNEXPECTED WEIGHT CHANGE: 1
COUGH: 0
NERVOUS/ANXIOUS: 0
DIARRHEA: 0
DEPRESSION: 0
VOICE CHANGE: 0
APPETITE CHANGE: 0
WHEEZING: 0
CONSTIPATION: 0
VOMITING: 0
EXTREMITY WEAKNESS: 0

## 2025-01-15 NOTE — PROGRESS NOTES
[facility]   Infusion Clinic Note   Date: January 15, 2025   Name: Pastora Rawls  : 2000   MRN: [id number]          Reason for Visit: OP Infusion (PT HERE FOR Q28 DAY ENTYVIO 300 MG INFUSION)         Today: We administered vedolizumab (Entyvio) 300 mg in sodium chloride 0.9% 255 mL IV.       Visit Type: INFUSION       Ordered By: LINDA       Accompanied by:GRANDMOTHER        Diagnosis: Ulcerative proctitis with complication (Multi)    Ulcerative colitis with complication, unspecified location (Multi)    Iron deficiency anemia due to chronic blood loss        Allergies:   Allergies as of 01/15/2025 - Reviewed 01/15/2025   Allergen Reaction Noted    Aspirin Other 2024    Gelatin Itching 10/05/2023    Nsaids (non-steroidal anti-inflammatory drug) Itching 10/05/2023          Current Medications has a current medication list which includes the following prescription(s): hydrocortisone, prednisone, budesonide er, ferrous sulfate (325 mg ferrous sulfate), ferumoxytol, and vedolizumab.       Vitals:   Vitals:    01/15/25 1354 01/15/25 1509   BP: 103/70 105/72   Pulse: 68 60   Resp: 16 16   Temp: 36.1 °C (97 °F) 36.3 °C (97.3 °F)   SpO2: 94% 99%   Weight: 70.8 kg (156 lb 1.4 oz)              Infusion Pre-procedure Checklist:   - Allergies reviewed: yes   - Medications reviewed: yes       - Previous reaction to current treatment: no      Assess patient for the concerns below. Document provider notification as appropriate.  - Active or recent infection with/without current antibiotic use: no  - Recent or planned invasive dental work: no  - Recent or planned surgeries: no  - Recently received or plans to receive vaccinations: no  - Has treatment related toxicities: no  - Is pregnant:  no      Pain: 0   - Is the pain different from normal: n/a   - Is your Doctor aware:  n/a       Labs: Labs drawn and sent per order          Fall Risk Screening: Chrissy Fall Risk  History of Falling,  Immediate or Within 3 Months: No  Secondary Diagnosis: No  Ambulatory Aid: Walks without aid/bedrest/nurse assist  Intravenous Therapy/Heparin Lock: Yes  Gait/Transferring: Normal/bedrest/immobile  Mental Status: Oriented to own ability  Lowe Fall Risk Score: 20       Review Of Systems:  Review of Systems   Constitutional:  Positive for unexpected weight change. Negative for appetite change, chills, fatigue and fever.        D/T PREDNISONE  MOD FATIGUE   HENT:   Negative for hearing loss, mouth sores, sore throat, tinnitus, trouble swallowing and voice change.    Eyes:  Negative for eye problems.   Respiratory:  Negative for cough, shortness of breath and wheezing.    Cardiovascular:  Negative for chest pain, leg swelling and palpitations.        ADMITS TO INTERMITTENT STAR LEG SWELLING POST WORKOUT.   Gastrointestinal:  Positive for abdominal pain and nausea. Negative for blood in stool, constipation, diarrhea and vomiting.        ADMITS TO 2-4 BMS PER DAY THAT ARE LOOSE IN CONSISTENCY. NOTES IMPROVEMENT SINCE STARTING PREDNISONE IN THIS PAST WEEK.   ADMITS TO BLOOD, MUCOUS AND PAIN WITH BMS.  ADMITS TO NOCTURNAL STOOLING AND INTERMITTENT ABD PAIN.    Genitourinary:  Negative for dysuria, frequency and hematuria.    Musculoskeletal:  Negative for arthralgias and myalgias.   Skin:  Negative for itching, rash and wound.   Neurological:  Negative for dizziness, extremity weakness, headaches, light-headedness and numbness.   Psychiatric/Behavioral:  Negative for confusion and depression. The patient is not nervous/anxious.          ROS completed? Yes      Infusion Readiness:  - Assessment Concerns Related to Infusion: No  - Provider notified: n/a      Document Below Only If Indicated:   New Patient Education:    N/A (returning patient for continuation of therapy. Ongoing education provided as needed.)        Treatment Conditions & Drug Specific Questions:    Vedolizumab  (ENTYVIO)    (Unless otherwise specified on  "patient specific therapy plan):     TREATMENT CONDITIONS:  Unless otherwise specified on patient specific therapy plan HOLD and notify provider prior to proceeding with treatment if:   o Positive Hepatitis B Surface Ag  o Positive T-Spot  o Patient has signs or symptoms of Progressive Multifocal Leukoencephalopath (PML)     Lab Results   Component Value Date    HEPBSAG Nonreactive 02/20/2024      No results found for: \"NONUHFIRE\", \"NONUHSWGH\", \"NONUHFISH\", \"EXTHEPBSAG\"  Lab Results   Component Value Date    TBSIN Negative 02/09/2024      Lab Results   Component Value Date    ALT 9 02/09/2024    AST 12 02/09/2024    ALKPHOS 216 (H) 02/09/2024    BILITOT 0.2 02/09/2024        Labs reviewed and patient meets treatment conditions? Yes    DRUG SPECIFIC QUESTIONS:  Any signs or symptoms of Progressive Multifocal Leukoencephalopath (PML) which may include: memory loss, trouble thinking, loss of balance, difficulty talking or walking, loss of vision?  No    (If YES notify prescribing provider prior to proceeding)    REMINDERS:  Recommended Vitals/Observation:  Vitals: Monitor vitals at start of infusion and at completion of infusion.  Observation: First 3 infusions patient may leave 15 minutes post infusion. Subsequent maintenance infusions: if no reaction, may leave immediately post infusion.        Weight Based Drug Calculations:    WEIGHT BASED DRUGS: NOT APPLICABLE / FLAT DOSE          Initiated By: Coral Weber RN        "

## 2025-01-15 NOTE — PATIENT INSTRUCTIONS
Today :We administered vedolizumab (Entyvio) 300 mg in sodium chloride 0.9% 255 mL IV.     For:   1. Ulcerative proctitis with complication (Multi)    2. Ulcerative colitis with complication, unspecified location (Multi)    3. Iron deficiency anemia due to chronic blood loss         Your next appointment is due in:  28 DAYS         Please read the  Medication Guide that was given to you and reviewed during todays visit.     (Tell all doctors including dentists that you are taking this medication)     Go to the emergency room or call 911 if:  -You have signs of allergic reaction:   -Rash, hives, itching.   -Swollen, blistered, peeling skin.   -Swelling of face, lips, mouth, tongue or throat.   -Tightness of chest, trouble breathing, swallowing or talking     Call your doctor:  - If IV / injection site gets red, warm, swollen, itchy or leaks fluid or pus.     (Leave dressing on your IV site for at least 2 hours and keep area clean and dry  - If you get sick or have symptoms of infection or are not feeling well for any reason.    (Wash your hands often, stay away from people who are sick)  - If you have side effects from your medication that do not go away or are bothersome.     (Refer to the teaching your nurse gave you for side effects to call your doctor about)    - Common side effects may include:  stuffy nose, headache, feeling tired, muscle aches, upset stomach  - Before receiving any vaccines     - Call the [facility] at   If:  - You get sick, are on antibiotics, have had a recent vaccine, have surgery or dental work and your doctor wants your visit rescheduled.  - You need to cancel and reschedule your visit for any reason. Call at least 2 days before your visit if you need to cancel.   - Your insurance changes before your next visit.    (We will need to get approval from your new insurance. This can take up to two weeks.)     The [facility] is opened Monday thru Friday. We are  closed on weekends and holidays.   Voice mail will take your call if the center is closed. If you leave a message please allow 24 hours for a call back during weekdays. If you leave a message on a weekend/holiday, we will call you back the next business day.    A pharmacist is available Monday - Friday from 8:30AM to 3:30PM to help answer any questions you may have about your prescriptions(s). Please call pharmacy at:    [facility]: [phone]  [facility]: [phone]  [facility]: [phone]

## 2025-01-16 ENCOUNTER — APPOINTMENT (OUTPATIENT)
Dept: GASTROENTEROLOGY | Facility: CLINIC | Age: 25
End: 2025-01-16
Payer: COMMERCIAL

## 2025-01-20 LAB
SCAN RESULT: NORMAL
VEDOLIZUMAB AB SERPL IA-MCNC: <1.6 U/ML
VEDOLIZUMAB SERPL IA-MCNC: 13.1 UG/ML

## 2025-01-22 NOTE — PROGRESS NOTES
REASON FOR VISIT:  ulcerative colitis    HPI:  Pastora Rawls is a 24 y.o. female who presents for follow-up.  Last seen 10/2024.  Diagnosed with UC age 7 (presenting with GI bleeding, diarrhea). Treated with azathioprine and 5-ASA.  Previously followed in Peds GI, but then no F/U for a few years.  (+) BRCA 2     3/2024 seen as NP and reported 3-4 Bms per day, but can get up to 6-8 times per day, loose. Very diet dependent. Having blood in stool - off and on.  (+) weight loss (2021 lost about 50 lbs, but more stable recently). 4/2024 colonoscopy with Mckeon 3 colitis to hepatic flexure.  Path c/w UC.  Started on budesonide (Uceris) and mesalamine and then started Entyvio therapy--> first dose 4/25/24.     Improved on Entyvio and budesonide (Uceris) , but felt less well after Uceris stopped.  Received iron infusion for anemia.  8/2024 VDZ level 2.1 without antibodies; FCP 2460; CRP 3.36.  Vedolizumab interval shortened to every 4 weeks.       Last seen 10/2024 and had yet to receive a an every 4 week vedolizumab.  BMs 2-4 daily.  Stool more often during the day, but no longer needing w/ nocturnal stools.  Weight slightly down.  No blood in the stool.  Stool loose to mushy.  She did- report feeling a bit better whenever got Entyvio infusion, though she does not feel as well as when first started therapy.  Again having some emesis during the week with cramping,up to 3 times a week.    In follow-up today, she has now received monthly Entyvio infusions x 3.  VDZ level prior to 2nd Q 4 week infusion 13.1.  CRP improved, but still at 1.69.  No longer iron deficient.  H/H 9/7/32.  Got Ferraheme in late 11/2024 x 2.      In follow-up today, has gotten 3 doses of C0zavpcs Entyvio. Is on Prednisone 20 mg once daily now. Less urgency, still having frequency. Not having as much bloody output, but she is still having 4-5 loose Bms daily still. Sometimes spends an hour in bathroom but no BM comes out. No joint pains. Having  "swelling, nausea, insomnia with prednisone.    Approximately 2 weeks ago we put her on prednisone for persistent symptoms of diarrhea urgency and cramping.  She did not tolerate prednisone well.  She feels like she has a \"brain fog\", she has increased joint discomfort, and her bowels have not really improved significantly.  She is currently 20 mg daily.  In the past, she felt great on Uceris.  However, for insurance reasons, she is only able to get 56 tablets every 90 days.    REVIEW OF SYSTEMS  Complete review of systems otherwise negative per complaint    Allergies   Allergen Reactions    Aspirin Other    Gelatin Itching     Nausea/vomitting    Nsaids (Non-Steroidal Anti-Inflammatory Drug) Itching     Crohn's       Past Medical History:   Diagnosis Date    Anemia     History of lumbar fusion     s/p h/o scoliosis    Personal history of other diseases of the respiratory system     Personal history of asthma    Ulcerative colitis     Ulcerative colitis        Past Surgical History:   Procedure Laterality Date    BACK SURGERY  09/17/2014    Back Surgery    TONSILLECTOMY  06/11/2014    Tonsillectomy With Adenoidectomy       Current Outpatient Medications   Medication Sig Dispense Refill    budesonide ER (Uceris) 9 mg tablet Take 1 tablet (9 mg) by mouth once daily. (Patient not taking: Reported on 1/15/2025) 30 tablet 0    ferrous sulfate, 325 mg ferrous sulfate, tablet Take 1 tablet (325 mg) by mouth once daily with breakfast. (Patient not taking: Reported on 12/18/2024)      ferumoxytol (Feraheme) 510 mg/17 mL (30 mg/mL) injection Infuse 17 mL (510 mg) into a venous catheter see administration instructions for 2 doses. (Patient not taking: Reported on 12/18/2024) 17 mL 1    hydrocortisone 2.5 % cream Apply topically 2 times a day. As needed 28 g 3    predniSONE (Deltasone) 10 mg tablet Take 4 tablets (40 mg) by mouth once daily for 7 days, THEN 3 tablets (30 mg) once daily for 7 days, THEN 2 tablets (20 mg) once " "daily for 7 days, THEN 1 tablet (10 mg) once daily for 7 days. Take by mouth as directed. 70 tablet 0    vedolizumab (Entyvio) 300 mg injection Infuse 300 mg into a venous catheter 1 time for 1 dose.  Every 4 weeks 5 mL 11     No current facility-administered medications for this visit.       PHYSICAL EXAM:  BP 92/54   Pulse 105   Temp 36.7 °C (98.1 °F) (Temporal)   Ht 1.626 m (5' 4\")   Wt 72.6 kg (160 lb)   BMI 27.46 kg/m²   Vital signs reviewed  Slight moon face  Patient alert and oriented in no acute distress  Anicteric  No cervical adenopathy  Cardiac exam regular rate and rhythm S1-S2 without murmurs gallops or rubs  Lungs clear to auscultation bilaterally  Abdomen soft and nontender without organomegaly or mass.  No rebound or guarding.  Bowel sounds present  Extremities without edema    Lab Results   Component Value Date    WBC 9.2 01/15/2025    HGB 10.2 (L) 01/15/2025    HCT 32.7 (L) 01/15/2025    MCV 84 01/15/2025     01/15/2025     Lab Results   Component Value Date    ALT 7 01/15/2025    AST 7 (L) 01/15/2025    ALKPHOS 116 (H) 01/15/2025    BILITOT 0.2 01/15/2025             ASSESSMENT  #UC-she has had a partial but not complete response to Entyvio therapy.  She continues with frequent loose stools.  She has urgency and some cramping.  After discussion with the patient, we have decided to make a change in therapy.  We discussed possible options, including anti-TNF agents and interleukin-23 blockers.  I recommended a trial of mirikizumab.  She will start with 300 mg intravenously at 0, 4, and 8 weeks for induction and at 12 weeks will start subcutaneous maintenance treatment 200 mg every 4 weeks.    #iron deficeincy anemia not currently iron deficient, however, she has been so in the past.  We will go ahead and repeat labs in 3 months to make sure that anemia is not worsening.    PLAN  1) we will stop vedolizumab infusions  2) transition to mirikizumab  3) follow-up in the office in 3 " months  4) check labs about 1 week prior to follow-up  5) call the office with any worsening symptoms

## 2025-01-23 ENCOUNTER — OFFICE VISIT (OUTPATIENT)
Dept: GASTROENTEROLOGY | Facility: CLINIC | Age: 25
End: 2025-01-23
Payer: COMMERCIAL

## 2025-01-23 VITALS
SYSTOLIC BLOOD PRESSURE: 92 MMHG | TEMPERATURE: 98.1 F | WEIGHT: 160 LBS | BODY MASS INDEX: 27.31 KG/M2 | DIASTOLIC BLOOD PRESSURE: 54 MMHG | HEART RATE: 105 BPM | HEIGHT: 64 IN

## 2025-01-23 DIAGNOSIS — K51.919 ULCERATIVE COLITIS WITH COMPLICATION, UNSPECIFIED LOCATION (MULTI): Primary | ICD-10-CM

## 2025-01-23 DIAGNOSIS — K51.219 ULCERATIVE PROCTITIS WITH COMPLICATION (MULTI): Primary | ICD-10-CM

## 2025-01-23 PROCEDURE — 99214 OFFICE O/P EST MOD 30 MIN: CPT | Performed by: INTERNAL MEDICINE

## 2025-01-23 PROCEDURE — 3008F BODY MASS INDEX DOCD: CPT | Performed by: INTERNAL MEDICINE

## 2025-01-23 PROCEDURE — 1036F TOBACCO NON-USER: CPT | Performed by: INTERNAL MEDICINE

## 2025-01-23 RX ORDER — ALBUTEROL SULFATE 0.83 MG/ML
3 SOLUTION RESPIRATORY (INHALATION) AS NEEDED
OUTPATIENT
Start: 2025-01-23

## 2025-01-23 RX ORDER — EPINEPHRINE 0.3 MG/.3ML
0.3 INJECTION SUBCUTANEOUS EVERY 5 MIN PRN
OUTPATIENT
Start: 2025-01-23

## 2025-01-23 RX ORDER — DIPHENHYDRAMINE HYDROCHLORIDE 50 MG/ML
50 INJECTION INTRAMUSCULAR; INTRAVENOUS AS NEEDED
OUTPATIENT
Start: 2025-01-23

## 2025-01-23 RX ORDER — MIRIKIZUMAB-MRKZ 20 MG/ML
300 INJECTION, SOLUTION INTRAVENOUS
Qty: 15 ML | Refills: 2 | Status: SHIPPED
Start: 2025-01-23

## 2025-01-23 RX ORDER — FAMOTIDINE 10 MG/ML
20 INJECTION INTRAVENOUS ONCE AS NEEDED
OUTPATIENT
Start: 2025-01-23

## 2025-01-23 ASSESSMENT — PAIN SCALES - GENERAL: PAINLEVEL_OUTOF10: 0-NO PAIN

## 2025-01-23 NOTE — PATIENT INSTRUCTIONS
As we discussed today, unfortunately you have not responded to every 4-week Entyvio infusions.  We need to make an change in therapy and I recommend a change to Omvoh (mirikizumab).      Induction therapy will be 300 mg intravenously at 0, 4, and 8 weeks and then at 12 weeks you will start on subcutaneous maintenance therapy at 200 mg every 4 weeks.    We will aim to transition to Omvoh in mid-February in pace of the Entyvio.    Please follow-up in the office in 3 months and check labs about a week prior to your follow-up appointment

## 2025-02-10 ENCOUNTER — DOCUMENTATION (OUTPATIENT)
Dept: INFUSION THERAPY | Facility: CLINIC | Age: 25
End: 2025-02-10
Payer: COMMERCIAL

## 2025-02-10 NOTE — PROGRESS NOTES
CLINICAL CLEARANCE FOR OUTPATIENT INFUSION      Patient to be scheduled for New Start of Omvoh Infusions     For Diagnosis: UC     Labs Required Prior to First Treatment:    T Spot Drawn/Results:   Lab Results   Component Value Date    TBSIN Negative 01/15/2025        ALT / AST:   Lab Results   Component Value Date    ALT 7 01/15/2025    AST 7 (L) 01/15/2025    ALKPHOS 116 (H) 01/15/2025    BILITOT 0.2 01/15/2025        (Hold/contact prescribing provider if: ALT >3X ULN)   (Hold/contact prescribing provider if : AST >3X ULN)    History of liver disease / cirrhosis? No    Patient Active Problem List   Diagnosis    Annual physical exam    Low serum albumin    Ulcerative colitis with complication (Multi)    Visit for TB skin test    Reactive thrombocytosis    Family history of BRCA gene mutation    Iron deficiency anemia due to chronic blood loss    Gustine cataract (Multi)    Cataract    Ulcerative colitis    Abnormal liver function tests    Anemia    Back pain    Hyperopia    Lower abdominal pain    Scoliosis    Pelvic pain in female    BRCA2 gene mutation positive     Past Medical History:   Diagnosis Date    Anemia     History of lumbar fusion     s/p h/o scoliosis    Personal history of other diseases of the respiratory system     Personal history of asthma    Ulcerative colitis     Ulcerative colitis        Last treatment received: ENTYVIO ON 1.15.25 (if continuation)   Due: CHANGING TO OMVOH     Okay to schedule for treatment as ordered by prescribing provider     PA COMPLETE.     Peggy Chu, APRN-CNP    [facility])  [address] [place] [address] [place] [address]  Phone: [phone]

## 2025-02-10 NOTE — PROGRESS NOTES
Pastora Rawls female   2000 24 y.o.   [id number]      Chief Complaint  High risk surveillance care, BRCA 2 positive    History Of Present Illness  Pastora Rawls is a very pleasant 24 y.o. AA female [facility]. She is here with her grandmother, Laurie. She has a strong family history of breast cancer, see below. She has a family history of BRCA 2 gene mutation in a maternal great aunt and her eight children. She denies breast surgery or biopsy. 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 She presents today for clinical exam. She declines prophylactic mastectomy at this time, but may consider in the future. She does report a history of ulcerative colitis currently managed with medication.     BREAST IMAGING: 10/22/2024 Bilateral Full MRI, indicates BI-RADS Category 2.      REPRODUCTIVE HISTORY: menarche age 10, nulliparous, no OCP's, premenopausal, regular monthly cycles, no contraception at this time, not trying to conceive     FAMILY CANCER HISTORY:  Mother: Breast cancer, age 35, , NO genetics  Maternal Great Aunt: Breast cancer, age 50, BRCA 2 positive  Maternal Great Great Aunt: Breast cancer, age 40  Maternal Third Cousin: Breast cancer, 30s  Maternal Second Cousin (1): BRCA 2 positive  Maternal Second Cousin (2): BRCA 2 positive  Maternal Second Cousin (3): BRCA 2 positive  Maternal Second Cousin (4): BRCA 2 positive  Maternal Second Cousin (5): BRCA 2 positive  Maternal Second Cousin (6): BRCA 2 positive  Maternal Second Cousin (7): BRCA 2 positive  Maternal Second Cousin (8): BRCA 2 positive  Maternal Grandmother: Colon cancer, age 80,         Surgical History  She has a past surgical history that includes Tonsillectomy (2014) and Back surgery (2014).     Social History  She reports that she has never smoked. She has never used smokeless tobacco.  She reports that she does not currently use alcohol. She reports that she does not use drugs.    Family History  Family History   Problem Relation Name Age of Onset    Breast cancer Mother      Breast cancer Mother's Sister      Breast cancer Maternal Grandmother          Allergies  Aspirin, Gelatin, and Nsaids (non-steroidal anti-inflammatory drug)    Medications  Current Outpatient Medications   Medication Instructions    budesonide ER (UCERIS) 9 mg, oral, Daily    ferrous sulfate 325 mg, Daily with breakfast    ferumoxytol (FERAHEME) 510 mg, intravenous, See admin instructions    hydrocortisone 2.5 % cream Topical, 2 times daily, As needed    Omvoh 300 mg, intravenous, Every 28 days         REVIEW OF SYSTEMS    Constitutional:  Negative for appetite change, fatigue, fever and unexpected weight change.   HENT:  Negative for ear pain, hearing loss, nosebleeds, sore throat and trouble swallowing.    Eyes:  Negative for discharge, itching and visual disturbance.   Respiratory:  Negative for cough, chest tightness and shortness of breath.    Cardiovascular:  Negative for chest pain, palpitations and leg swelling.   Breast: as indicated in HPI  Gastrointestinal:  Negative for abdominal pain, constipation, diarrhea and nausea.   Endocrine: Negative for cold intolerance and heat intolerance.   Genitourinary:  Negative for dysuria, frequency, hematuria, pelvic pain and vaginal bleeding.   Musculoskeletal:  Negative for arthralgias, back pain, gait problem, joint swelling and myalgias.   Skin:  Negative for color change and rash.   Allergic/Immunologic: Negative for environmental allergies and food allergies.   Neurological:  Negative for dizziness, tremors, speech difficulty, weakness, numbness and headaches.   Hematological:  Does not bruise/bleed easily.   Psychiatric/Behavioral:  Negative for agitation, dysphoric mood and sleep disturbance. The patient is not nervous/anxious.         Past Medical History  She has a  past medical history of Anemia, History of lumbar fusion, Personal history of other diseases of the respiratory system, Ulcerative colitis, and Ulcerative colitis.    She has no past medical history of Abnormal Pap smear of cervix, Chlamydia, Congenital heart disease, CTS (carpal tunnel syndrome), Depression, Disease of thyroid gland, Endometriosis, Epilepsy, Female infertility, Fibroid, Gestational diabetes, Gonorrhea, Hepatitis B, Hepatitis C, Herpes, HIV disease (Multi), HPV (human papilloma virus) infection, Hyperthyroidism, Hypothyroidism, Kidney stone, Lupus, Migraine, Ovarian cancer (Multi), PID (pelvic inflammatory disease), Polycystic ovary syndrome, Pulmonary embolism, Pyelonephritis, Recurrent pregnancy loss, antepartum condition or complication ([facility]), Rh incompatibility, Rheumatoid arthritis, Sickle cell anemia (Multi), Syphilis, Tuberculosis, Urinary tract infection, Urogenital trichomoniasis, Urolithiasis, Uterine cancer (Multi), or Varicella.     Physical Exam  Patient is alert and oriented x3 and in a relaxed and appropriate mood. Her gait is steady and hand grasps are equal. Sclera is clear. The breasts are nearly symmetrical, large and pendulous. The tissue is dense and grainy without palpable abnormalities, discrete nodules or masses. The skin and nipples appear normal. There is no cervical, supraclavicular or axillary lymphadenopathy. Heart rate and rhythm normal, S1 and S2 appreciated. The lungs are clear to auscultation bilaterally. Abdomen is soft and non-tender.       Physical Exam     Last Recorded Vitals  Vitals:    02/27/25 1509   BP: 108/65   Pulse: 95   Temp: 36.4 °C (97.5 °F)   SpO2: 99%     Relevant Results   Time was spent discussing digital images of the radiology testing with the patient. I explained the results in depth, along with suggested explanation for follow up recommendations based on the testing results. BI-RADS Category 2    Imaging  Narrative & Impression    Interpreted By:  Ahmet Gonzalez,   STUDY:  BI MR BREAST BILATERAL WITH CONTRAST FULL PROTOCOL;  10/22/2024 8:35  am      ACCESSION NUMBER(S):  [id number]      ORDERING CLINICIAN:  KRISTIN SIERRA      INDICATION:  High-risk screening. BRCA 2 positive. Strong family history of breast  cancer.      ,Z12.39 Encounter for other screening for malignant neoplasm of  breast,Z15.01 Genetic susceptibility to malignant neoplasm of  breast,Z15.09 Genetic susceptibility to other malignant neoplasm      COMPARISON:  No prior exams are available for review. This will serve as the  patient's baseline exam.      TECHNIQUE:  Using a dedicated breast coil, STIR axial and T1-weighted fat  saturation axial images of the breasts were obtained, the latter both  before and after intravenous administration of Gadolinium DTPA. On an  independent workstation, 3-D images were formulated using [employer]  including time enhancement curves, subtraction images and MIP images.      Intravenous contrast: 13 ML of Dotarem      FINDINGS:  Density: Heterogeneous fibroglandular tissue.      There is symmetric minimal bilateral background enhancement.      RIGHT BREAST:  No suspicious mass or nonmass enhancement is  identified.      No axillary or internal mammary lymphadenopathy is appreciated.      LEFT BREAST: T2 hyperintense enhancing lymph node in the lower inner  left breast at anterior depth as seen on image 127 of 200. No  suspicious mass or nonmass enhancement is identified.      No axillary or internal mammary lymphadenopathy is appreciated.      NON-BREAST FINDINGS:  None.      IMPRESSION:  No MRI evidence of malignancy in either breast.      BI-RADS CATEGORY:  BI-RADS Category:  2 Benign.  Recommendation:  Annual Screening.  Recommended Date:  1 Year.  Laterality:  Bilateral.       Assessment/Plan   High risk surveillance care, normal clinical exam, family history of BRCA 2 gene mutation, BRCA 2 positive, VUS in RAD51D, family history of  breast cancer     Plan: Full breast MRI in October 2025 with office visit to follow.     Patient Discussion/Summary  Your clinical examination is normal. Please return in October 2025 for clinical exam and office visit following Full MRI.     You can see your health information, review clinical summaries from office visits & test results online when you follow your health with MY  Chart, a personal health record. To sign up go to [email or web address]. If you need assistance with signing up or trouble getting into your account call [employer] Patient Line 24/7 at [phone].    My office phone number is [phone] if you need to get in touch with me or have additional questions or concerns. Thank you for choosing [facility] and trusting me as your healthcare provider. I look forward to seeing you again at your next office visit. I am honored to be a provider on your health care team and I remain dedicated to helping you achieve your health goals.      Jia Art, APRN-CNP

## 2025-02-12 ENCOUNTER — APPOINTMENT (OUTPATIENT)
Dept: INFUSION THERAPY | Facility: CLINIC | Age: 25
End: 2025-02-12
Payer: COMMERCIAL

## 2025-02-12 VITALS
RESPIRATION RATE: 16 BRPM | HEART RATE: 112 BPM | OXYGEN SATURATION: 99 % | TEMPERATURE: 97.9 F | DIASTOLIC BLOOD PRESSURE: 78 MMHG | WEIGHT: 150.35 LBS | SYSTOLIC BLOOD PRESSURE: 124 MMHG | BODY MASS INDEX: 25.81 KG/M2

## 2025-02-12 DIAGNOSIS — K51.219 ULCERATIVE PROCTITIS WITH COMPLICATION (MULTI): ICD-10-CM

## 2025-02-12 LAB — PREGNANCY TEST URINE, POC: NEGATIVE

## 2025-02-12 PROCEDURE — 96365 THER/PROPH/DIAG IV INF INIT: CPT | Performed by: NURSE PRACTITIONER

## 2025-02-12 PROCEDURE — 81025 URINE PREGNANCY TEST: CPT | Performed by: NURSE PRACTITIONER

## 2025-02-12 RX ORDER — EPINEPHRINE 0.3 MG/.3ML
0.3 INJECTION SUBCUTANEOUS EVERY 5 MIN PRN
OUTPATIENT
Start: 2025-03-12

## 2025-02-12 RX ORDER — HEPARIN SODIUM,PORCINE/PF 10 UNIT/ML
50 SYRINGE (ML) INTRAVENOUS AS NEEDED
OUTPATIENT
Start: 2025-02-12

## 2025-02-12 RX ORDER — FAMOTIDINE 10 MG/ML
20 INJECTION INTRAVENOUS ONCE AS NEEDED
OUTPATIENT
Start: 2025-03-12

## 2025-02-12 RX ORDER — DIPHENHYDRAMINE HYDROCHLORIDE 50 MG/ML
50 INJECTION INTRAMUSCULAR; INTRAVENOUS AS NEEDED
OUTPATIENT
Start: 2025-03-12

## 2025-02-12 RX ORDER — ALBUTEROL SULFATE 0.83 MG/ML
3 SOLUTION RESPIRATORY (INHALATION) AS NEEDED
OUTPATIENT
Start: 2025-03-12

## 2025-02-12 RX ORDER — HEPARIN 100 UNIT/ML
500 SYRINGE INTRAVENOUS AS NEEDED
OUTPATIENT
Start: 2025-02-12

## 2025-02-12 ASSESSMENT — ENCOUNTER SYMPTOMS
FEVER: 0
LEG SWELLING: 0
ABDOMINAL PAIN: 1
DIZZINESS: 0
SHORTNESS OF BREATH: 0
DIARRHEA: 1
VOMITING: 0
EYE PROBLEMS: 0
ARTHRALGIAS: 0
DEPRESSION: 0
DECREASED CONCENTRATION: 0
BLOOD IN STOOL: 1
NAUSEA: 1
NERVOUS/ANXIOUS: 0
SORE THROAT: 0
FATIGUE: 1
APPETITE CHANGE: 0
ADENOPATHY: 0
WOUND: 0
HEADACHES: 0
NUMBNESS: 0
MYALGIAS: 0
DYSURIA: 0
CONFUSION: 0
PALPITATIONS: 0
COUGH: 0
TROUBLE SWALLOWING: 0

## 2025-02-12 NOTE — PROGRESS NOTES
"[facility]   Infusion Clinic Note   Date: 2025   Name: Pastora Rawls  : 2000   MRN: [id number]          Reason for Visit: OP Infusion (PT. HERE FOR FIRST OMVOH 300 MG IV INFUSION./\" WEEK 0\".)         Today: We administered mirikizumab-mrkz.       Visit Type: INFUSION       Ordered By: Lazaro Shah MD        Accompanied by: Self       Diagnosis: Ulcerative proctitis with complication (Multi)        Allergies:   Allergies as of 2025 - Reviewed 2025   Allergen Reaction Noted    Aspirin Other 2024    Gelatin Itching 10/05/2023    Nsaids (non-steroidal anti-inflammatory drug) Itching 10/05/2023          Current Medications: has a current medication list which includes the following prescription(s): hydrocortisone, omvoh, budesonide er, ferrous sulfate (325 mg ferrous sulfate), and ferumoxytol.       Vitals:   Vitals:    25 1407 25 1542   BP: 102/72 124/78   Pulse: (!) 120  Comment: NURSE NOTIFIED (!) 112  Comment: BLEINDA CHRISTIAN NP INTO SEE PT. RE: HEART RATE, PT.WITHOUT COMPLAINTS , WAS ENCOURAGED TO DRINK MORE FLUIDS.   Resp: 16    Temp: 36.3 °C (97.3 °F) 36.6 °C (97.9 °F)   SpO2: 98% 99%   Weight: 68.2 kg (150 lb 5.7 oz)        NOTIFIED OF ELEVATED HR. PT ASYMPTOMATIC IN R/T HR. DENIES PALPITATIONS, CP, FEELING DIZZY OR LIGHTHEADED. UPON AUSCULTATION  REGULAR RHYTHM. PER CHART REVIEW NOTED HX OF ELEVATED HR AT TIMES. PER PT / FAMILY PT DOES DRINK APPROX 1/2 GALLON OF WATER PER DAY BUT HAS BEEN WITH INCREASED OUTPUT / DIARRHEA R/T DIAGNOSIS. ENCOURAGED INCREASED FLUID INTAKE. INSTRUCTED ON S/S TO MONITOR FOR THAT WOULD WARRANT FURTHER EVAL IN R/T ELEVATED HR. PT AND FAMILY MEMBER VERBALIZE UNDERSTANDING. IDALIA Sykes-CNP         Infusion Pre-procedure Checklist:   - Allergies & Medications reviewed: yes       - Previous reaction to current treatment: FIRST OMVOH INFUSION TODAY.      Assess patient for the concerns " "below. Document provider notification as appropriate.  - Active or recent infection with/without current antibiotic use: no  - Recent or planned invasive dental work: no  - Recent or planned surgeries: no  - Recently received or plans to receive vaccinations: no  - Has treatment related toxicities: no  - Any chance you may be pregnant:  no, PREGNANCY TEST DONE HERE AT [facility]. NEGATIVE.      Pain: 0   - Is the pain different from normal: n/a   - Is your Doctor aware:  n/a       Labs: N/A          Fall Risk Screening: Lowe Fall Risk  History of Falling, Immediate or Within 3 Months: No  Secondary Diagnosis: No  Ambulatory Aid: Walks without aid/bedrest/nurse assist  Intravenous Therapy/Heparin Lock: Yes  Gait/Transferring: Normal/bedrest/immobile  Mental Status: Oriented to own ability  Lowe Fall Risk Score: 20       Review Of Systems:  Review of Systems   Constitutional:  Positive for fatigue. Negative for appetite change and fever.        ADMITS TO INTERMITTENT MILD FATIGUE.   HENT:   Negative for hearing loss, mouth sores, sore throat and trouble swallowing.    Eyes:  Negative for eye problems.   Respiratory:  Negative for cough and shortness of breath.         HX OF ASTHMA AS CHILD, HAS OUTGROWN ASTHMA.   Cardiovascular:  Negative for chest pain, leg swelling and palpitations.        HEART RATE ELEVATED 108-120 DURING VISIT. PT. WITHOUT S/S OF TACHYCARDIA.  BELINDA CHRISTIAN NP INTO SEE PT. PT. FEELS SHE DRINKS A LOT OF WATER BUT ALSO HAVING LOOSE STOOLS.   PT. AGREEABLE TO INCREASE FLUID INTAKE.   Gastrointestinal:  Positive for abdominal pain, blood in stool, diarrhea and nausea. Negative for vomiting.        PT. WITH HX OF ULCERATIVE PROCTITIS.  PREVIOUSLY ON ENTYVIO AND UCERIS , NONE OPTIMAL RESULTS, DISCONTINUED.  PT. RECENTLY FINISHED PREDNISONE TAPER ON FEB 8 TH.  HERE TODAY FOR FIRST OMVOH 300 MG IV INFUSION, \" WEEK 0\"  PT. ADMITS TO HAVING 4-6 LIQUID BM'S DAILY, NOW THAT SHE IS OFF PREDNISONE.  DID SEE BLOOD " ONCE. PT. CAN SPEND 1-3 HOURS IN BATHROOM WITH URGE TO GO.  ADMITS TO URGENCY AND MULTIPLE NOCTURNAL STOOLS NIGHTLY.  DENIES MUCUS,  CAN HAVE INTERMITTENT RIGHT UPPER QUADRANT PAIN.  ADMITS TO RANDOM NAUSEA WITHOUT EMESIS.       Genitourinary:  Negative for dysuria.    Musculoskeletal:  Negative for arthralgias and myalgias.   Skin:  Positive for rash. Negative for itching and wound.        PT ADMITS TO HAVING NEW, SMALL BUMPS ON HANDS , VARIOUS PLACES ON BODY.   NOT SURE WHAT IT IS FROM, DENIES ITCH.  DR. LINDA CORONADO, DERMATOLOGIST RECOMMENDED.   Neurological:  Negative for dizziness, headaches and numbness.   Hematological:  Negative for adenopathy.   Psychiatric/Behavioral:  Negative for confusion, decreased concentration and depression. The patient is not nervous/anxious.         DOES ADMIT TO BRAIN FOG, NOT SURE IF FROM COMPLETING PREDNISONE.         ROS completed? Yes      Infusion Readiness:  - Assessment Concerns Related to Infusion: No  - Provider notified: n/a      Document Below Only If Indicated:   New Patient Education:    NEW PATIENT MEDICATION EDUCATION PT PROVIDED WITH WRITTEN ([employer] PT EDUCATION SHEET) AND VERBAL EDUCATION REGARDING MEDICATION GIVEN. VERIFIED MEDICATION NAME WITH PATIENT AND DISCUSSED REASON FOR USE. BRIEFLY DISCUSSED HOW MEDICATION WORKS AND EDUCATED ON GOAL OF TREATMENT, FREQUENCY OF TREATMENT, ADVERSE RXN'S AND COMMON SIDE EFFECTS TO MONITOR FOR. INSTRUCTED PT TO ASSURE THAT ALL PROVIDERS INCLUDING DENTISTS ARE AWARE OF MEDICATION RECEIVED. DISCUSSED FLOW OF VISIT AND ORIENTED TO INFUSION CENTER. PT VERBALIZES UNDERSTANDING. CALL LIGHT PROVIDED AND PT AWARE TO ALERT STAFF OF ANY CONCERNS DURING TREATMENT.        Treatment Conditions & Drug Specific Questions:    Mirikizumab (OMVOH)    (Unless otherwise specified on patient specific therapy plan):     TREATMENT CONDITIONS:  Unless otherwise specified on patient specific therapy plan HOLD and notify provider prior to proceeding  "with treatment if:   o ALT GREATER than 3x ULN and/or AST GREATER than 3x ULN  o Positive T-spot      Chemistry    Lab Results   Component Value Date/Time     01/15/2025 1441    K 3.3 (L) 01/15/2025 1441     01/15/2025 1441    CO2 25 01/15/2025 1441    BUN 20 01/15/2025 1441    CREATININE 0.47 (L) 01/15/2025 1441    Lab Results   Component Value Date/Time    CALCIUM 8.9 01/15/2025 1441    ALKPHOS 116 (H) 01/15/2025 1441    AST 7 (L) 01/15/2025 1441    ALT 7 01/15/2025 1441    BILITOT 0.2 01/15/2025 1441        ,  Lab Results   Component Value Date    ALT 7 01/15/2025    AST 7 (L) 01/15/2025    ALKPHOS 116 (H) 01/15/2025    BILITOT 0.2 01/15/2025      Lab Results   Component Value Date    TBSIN Negative 01/15/2025    No results found for: \"NONUHFIRE\", \"NONUHSWGH\", \"NONUHFISH\", \"EXTHEPBSAG\"    Labs reviewed and patient meets treatment conditions? Yes    Recommended Vitals/Observation:  Vitals: Monitor patient's vital signs prior to infusion start, at end of infusion and as needed.  Observation: No observation.          Weight Based Drug Calculations:    WEIGHT BASED DRUGS: NOT APPLICABLE / FLAT DOSE          Initiated By: Jocelin Santamaria RN        "

## 2025-02-12 NOTE — PATIENT INSTRUCTIONS
Today :We administered mirikizumab-mrkz.     For:   1. Ulcerative proctitis with complication (Multi)         Your next appointment is due in: 4 WEEKS      Please read the  Medication Guide that was given to you and reviewed during todays visit.     (Tell all doctors including dentists that you are taking this medication)     Go to the emergency room or call 911 if:  -You have signs of allergic reaction:   -Rash, hives, itching.   -Swollen, blistered, peeling skin.   -Swelling of face, lips, mouth, tongue or throat.   -Tightness of chest, trouble breathing, swallowing or talking     Call your doctor:  - If IV / injection site gets red, warm, swollen, itchy or leaks fluid or pus.     (Leave dressing on your IV site for at least 2 hours and keep area clean and dry  - If you get sick or have symptoms of infection or are not feeling well for any reason.    (Wash your hands often, stay away from people who are sick)  - If you have side effects from your medication that do not go away or are bothersome.     (Refer to the teaching your nurse gave you for side effects to call your doctor about)    - Common side effects may include:  stuffy nose, headache, feeling tired, muscle aches, upset stomach  - Before receiving any vaccines     - Call the [facility] at   If:  - You get sick, are on antibiotics, have had a recent vaccine, have surgery or dental work and your doctor wants your visit rescheduled.  - You need to cancel and reschedule your visit for any reason. Call at least 2 days before your visit if you need to cancel.   - Your insurance changes before your next visit.    (We will need to get approval from your new insurance. This can take up to two weeks.)     The [facility] is opened Monday thru Friday. We are closed on weekends and holidays.   Voice mail will take your call if the center is closed. If you leave a message please allow 24 hours for a call back during weekdays. If you  leave a message on a weekend/holiday, we will call you back the next business day.    A pharmacist is available Monday - Friday from 8:30AM to 3:30PM to help answer any questions you may have about your prescriptions(s). Please call pharmacy at:    [facility]: [phone]  [facility]: [phone]  [facility]: [phone]

## 2025-02-14 DIAGNOSIS — K51.919 ULCERATIVE COLITIS WITH COMPLICATION, UNSPECIFIED LOCATION (MULTI): Primary | ICD-10-CM

## 2025-02-14 RX ORDER — BUDESONIDE 9 MG/1
9 TABLET, FILM COATED, EXTENDED RELEASE ORAL DAILY
Qty: 30 TABLET | Refills: 1 | Status: SHIPPED | OUTPATIENT
Start: 2025-02-14 | End: 2025-04-15

## 2025-02-27 ENCOUNTER — OFFICE VISIT (OUTPATIENT)
Dept: SURGICAL ONCOLOGY | Facility: CLINIC | Age: 25
End: 2025-02-27
Payer: COMMERCIAL

## 2025-02-27 VITALS
DIASTOLIC BLOOD PRESSURE: 65 MMHG | TEMPERATURE: 97.5 F | HEART RATE: 95 BPM | WEIGHT: 160 LBS | SYSTOLIC BLOOD PRESSURE: 108 MMHG | BODY MASS INDEX: 27.46 KG/M2 | OXYGEN SATURATION: 99 %

## 2025-02-27 DIAGNOSIS — Z12.39 BREAST CANCER SCREENING, HIGH RISK PATIENT: ICD-10-CM

## 2025-02-27 DIAGNOSIS — Z15.09 BRCA2 POSITIVE: ICD-10-CM

## 2025-02-27 DIAGNOSIS — Z15.01 BRCA2 POSITIVE: ICD-10-CM

## 2025-02-27 PROCEDURE — 99213 OFFICE O/P EST LOW 20 MIN: CPT | Performed by: NURSE PRACTITIONER

## 2025-02-27 PROCEDURE — 1036F TOBACCO NON-USER: CPT | Performed by: NURSE PRACTITIONER

## 2025-02-27 ASSESSMENT — PATIENT HEALTH QUESTIONNAIRE - PHQ9
2. FEELING DOWN, DEPRESSED OR HOPELESS: NOT AT ALL
1. LITTLE INTEREST OR PLEASURE IN DOING THINGS: NOT AT ALL
SUM OF ALL RESPONSES TO PHQ9 QUESTIONS 1 & 2: 0

## 2025-02-27 ASSESSMENT — PAIN SCALES - GENERAL: PAINLEVEL_OUTOF10: 0-NO PAIN

## 2025-02-27 NOTE — PATIENT INSTRUCTIONS
Your clinical examination is normal. Please return in October 2025 for clinical exam and office visit following Full MRI.     You can see your health information, review clinical summaries from office visits & test results online when you follow your health with MY  Chart, a personal health record. To sign up go to [email or web address]. If you need assistance with signing up or trouble getting into your account call [employer] Patient Line 24/7 at [phone].    My office phone number is [phone] if you need to get in touch with me or have additional questions or concerns. Thank you for choosing [facility] and trusting me as your healthcare provider. I look forward to seeing you again at your next office visit. I am honored to be a provider on your health care team and I remain dedicated to helping you achieve your health goals.

## 2025-02-28 DIAGNOSIS — D50.0 IRON DEFICIENCY ANEMIA DUE TO CHRONIC BLOOD LOSS: Primary | ICD-10-CM

## 2025-03-03 DIAGNOSIS — D50.0 IRON DEFICIENCY ANEMIA DUE TO CHRONIC BLOOD LOSS: ICD-10-CM

## 2025-03-07 DIAGNOSIS — K51.919 ULCERATIVE COLITIS WITH COMPLICATION, UNSPECIFIED LOCATION (MULTI): Primary | ICD-10-CM

## 2025-03-07 RX ORDER — MIRIKIZUMAB-MRKZ 100 MG/ML
200 INJECTION, SOLUTION SUBCUTANEOUS
Qty: 2 ML | Refills: 6 | Status: SHIPPED | OUTPATIENT
Start: 2025-03-07

## 2025-03-14 ENCOUNTER — APPOINTMENT (OUTPATIENT)
Dept: INFUSION THERAPY | Facility: CLINIC | Age: 25
End: 2025-03-14
Payer: COMMERCIAL

## 2025-03-14 VITALS
HEART RATE: 84 BPM | TEMPERATURE: 97 F | SYSTOLIC BLOOD PRESSURE: 102 MMHG | OXYGEN SATURATION: 99 % | BODY MASS INDEX: 29.04 KG/M2 | RESPIRATION RATE: 16 BRPM | WEIGHT: 169.2 LBS | DIASTOLIC BLOOD PRESSURE: 67 MMHG

## 2025-03-14 DIAGNOSIS — K51.219 ULCERATIVE PROCTITIS WITH COMPLICATION (MULTI): ICD-10-CM

## 2025-03-14 RX ORDER — EPINEPHRINE 0.3 MG/.3ML
0.3 INJECTION SUBCUTANEOUS EVERY 5 MIN PRN
OUTPATIENT
Start: 2025-04-09

## 2025-03-14 RX ORDER — DIPHENHYDRAMINE HYDROCHLORIDE 50 MG/ML
50 INJECTION INTRAMUSCULAR; INTRAVENOUS AS NEEDED
OUTPATIENT
Start: 2025-04-09

## 2025-03-14 RX ORDER — FAMOTIDINE 10 MG/ML
20 INJECTION, SOLUTION INTRAVENOUS ONCE AS NEEDED
OUTPATIENT
Start: 2025-04-09

## 2025-03-14 RX ORDER — ALBUTEROL SULFATE 0.83 MG/ML
3 SOLUTION RESPIRATORY (INHALATION) AS NEEDED
OUTPATIENT
Start: 2025-04-09

## 2025-03-14 ASSESSMENT — ENCOUNTER SYMPTOMS
HEADACHES: 0
ABDOMINAL PAIN: 0
FREQUENCY: 0
EXTREMITY WEAKNESS: 0
UNEXPECTED WEIGHT CHANGE: 0
DIZZINESS: 0
WHEEZING: 0
VOICE CHANGE: 0
NAUSEA: 0
CHILLS: 0
ARTHRALGIAS: 0
APPETITE CHANGE: 0
LEG SWELLING: 1
NERVOUS/ANXIOUS: 0
CONSTIPATION: 0
FATIGUE: 0
PALPITATIONS: 0
SHORTNESS OF BREATH: 0
HEMATURIA: 0
COUGH: 0
LIGHT-HEADEDNESS: 0
BLOOD IN STOOL: 0
DEPRESSION: 0
FEVER: 0
SORE THROAT: 0
MYALGIAS: 1
TROUBLE SWALLOWING: 0
NUMBNESS: 0
WOUND: 0
DIARRHEA: 0
EYE PROBLEMS: 0
DYSURIA: 0
VOMITING: 0

## 2025-03-14 NOTE — PATIENT INSTRUCTIONS
Today :We administered mirikizumab-mrkz.     For:   1. Ulcerative proctitis with complication (Multi)         Your next appointment is due in:  1 MONTH        Please read the  Medication Guide that was given to you and reviewed during todays visit.     (Tell all doctors including dentists that you are taking this medication)     Go to the emergency room or call 911 if:  -You have signs of allergic reaction:   -Rash, hives, itching.   -Swollen, blistered, peeling skin.   -Swelling of face, lips, mouth, tongue or throat.   -Tightness of chest, trouble breathing, swallowing or talking     Call your doctor:  - If IV / injection site gets red, warm, swollen, itchy or leaks fluid or pus.     (Leave dressing on your IV site for at least 2 hours and keep area clean and dry  - If you get sick or have symptoms of infection or are not feeling well for any reason.    (Wash your hands often, stay away from people who are sick)  - If you have side effects from your medication that do not go away or are bothersome.     (Refer to the teaching your nurse gave you for side effects to call your doctor about)    - Common side effects may include:  stuffy nose, headache, feeling tired, muscle aches, upset stomach  - Before receiving any vaccines     - Call the [facility] at   If:  - You get sick, are on antibiotics, have had a recent vaccine, have surgery or dental work and your doctor wants your visit rescheduled.  - You need to cancel and reschedule your visit for any reason. Call at least 2 days before your visit if you need to cancel.   - Your insurance changes before your next visit.    (We will need to get approval from your new insurance. This can take up to two weeks.)     [facility] is opened Monday thru Friday. We are closed on weekends and holidays.   Voice mail will take your call if the center is closed. If you leave a message please allow 24 hours for a call back during weekdays. If  you leave a message on a weekend/holiday, we will call you back the next business day.    A pharmacist is available Monday - Friday from 8:30AM to 3:30PM to help answer any questions you may have about your prescriptions(s). Please call pharmacy at:    [facility]: [phone]  [facility]: [phone]  [facility]: [phone]

## 2025-03-14 NOTE — PROGRESS NOTES
[facility]   Infusion Clinic Note   Date: 2025   Name: Pastora Rawls  : 2000   MRN: [id number]         Reason for Visit: OP Infusion (PT HERE FOR 'WEEK 4' INDUCTION OMVOH 300 MG INFUSION - NEXT DUE IN 4 WEEKS)         Today: We administered mirikizumab-mrkz.       Ordered By: Lazaro Shah MD       For a Diagnosis of: Ulcerative proctitis with complication (Multi)       At today's visit patient accompanied by: Self      Today's Vitals:   Vitals:    25 1520 25 1627   BP: 111/74 102/67   Pulse: 81 84   Resp: 16 16   Temp: 36.4 °C (97.5 °F) 36.1 °C (97 °F)   SpO2: 98% 99%   Weight: 76.8 kg (169 lb 3.3 oz)              Pre - Treatment Checklist:      - Previous reaction to current treatment: no      (Assess patient for the concerns below. Document provider notification as appropriate).  - Active or recent infection with/without current antibiotic use: no  - Recent or planned invasive dental work: no  - Recent or planned surgeries: no  - Recently received or plans to receive vaccinations: no  - Has treatment related toxicities: no  - Any chance may be pregnant:  no      Pain: 2 - THIGHS BILATERAL   - Is the pain different from normal: yes   - Is prescribing Doctor aware:  no      Labs: N/A      Fall Risk Screening: Lowe Fall Risk  History of Falling, Immediate or Within 3 Months: No  Secondary Diagnosis: No  Ambulatory Aid: Walks without aid/bedrest/nurse assist  Intravenous Therapy/Heparin Lock: Yes  Gait/Transferring: Normal/bedrest/immobile  Mental Status: Oriented to own ability  Lowe Fall Risk Score: 20       Review Of Systems:  Review of Systems   Constitutional:  Negative for appetite change, chills, fatigue, fever and unexpected weight change.   HENT:   Negative for hearing loss, mouth sores, sore throat, tinnitus, trouble swallowing and voice change.    Eyes:  Negative for eye problems.   Respiratory:  Negative for cough, shortness of breath and  "wheezing.    Cardiovascular:  Positive for leg swelling. Negative for chest pain and palpitations.        PT ADMITS TO THIGH SWELLING WITH DISCOMFORT, FEELS \"TIGHT\", MD AWARE   Gastrointestinal:  Negative for abdominal pain, blood in stool, constipation, diarrhea, nausea and vomiting.        ADMITS TO APPROX. 4 STOOLS PER DAY, VARIES IN CONSISTENCY  DENIES CONSTIPATION AND DIARRHEA  DENIES BLOOD IN THE STOOL BUT ADMITS TO MUCOUS  DENIES ABDOMINAL PAIN, N/V  DENIES NOCTURNAL BOWEL MOVEMENTS   Genitourinary:  Negative for dysuria, frequency and hematuria.    Musculoskeletal:  Positive for myalgias. Negative for arthralgias.        PT ADMITS TO PAIN IN THIGHS WITH \"SWELLING\", MD AWARE   Skin:  Negative for itching, rash and wound.   Neurological:  Negative for dizziness, extremity weakness, headaches, light-headedness and numbness.   Psychiatric/Behavioral:  Negative for depression. The patient is not nervous/anxious.          Infusion Readiness:  - Assessment Concerns Related to Infusion: No  - Provider notified: n/a      New Patient Education:    N/A (returning patient for continuation of therapy. Ongoing education provided as needed.)        Treatment Conditions & Drug Specific Questions:    Mirikizumab (OMVOH)    (Unless otherwise specified on patient specific therapy plan):     TREATMENT CONDITIONS:  Unless otherwise specified on patient specific therapy plan HOLD and notify provider prior to proceeding with treatment if:   o ALT GREATER than 3x ULN and/or AST GREATER than 3x ULN  o Positive T-spot      Chemistry    Lab Results   Component Value Date/Time     01/15/2025 1441    K 3.3 (L) 01/15/2025 1441     01/15/2025 1441    CO2 25 01/15/2025 1441    BUN 20 01/15/2025 1441    CREATININE 0.47 (L) 01/15/2025 1441    Lab Results   Component Value Date/Time    CALCIUM 8.9 01/15/2025 1441    ALKPHOS 116 (H) 01/15/2025 1441    AST 7 (L) 01/15/2025 1441    ALT 7 01/15/2025 1441    BILITOT 0.2 01/15/2025 1441 " "       ,  Lab Results   Component Value Date    ALT 7 01/15/2025    AST 7 (L) 01/15/2025    ALKPHOS 116 (H) 01/15/2025    BILITOT 0.2 01/15/2025      Lab Results   Component Value Date    TBSIN Negative 01/15/2025    No results found for: \"NONUHFIRE\", \"NONUHSWGH\", \"NONUHFISH\", \"EXTHEPBSAG\"    Patient meets treatment conditions? Yes    Recommended Vitals/Observation:  Vitals: Monitor patient's vital signs prior to infusion start, at end of infusion and as needed.  Observation: No observation.          Weight Based Drug Calculations:    WEIGHT BASED DRUGS: NOT APPLICABLE / FLAT DOSE       Post Treatment: Patient tolerated treatment without issue and was discharged in no apparent distress.      Note Authored / Patient Cared for By: Stefanie Vizcaino RN        "

## 2025-03-20 DIAGNOSIS — D50.0 IRON DEFICIENCY ANEMIA DUE TO CHRONIC BLOOD LOSS: ICD-10-CM

## 2025-03-20 DIAGNOSIS — K51.919 ULCERATIVE COLITIS WITH COMPLICATION, UNSPECIFIED LOCATION (MULTI): Primary | ICD-10-CM

## 2025-04-08 LAB
CRP SERPL-MCNC: 5.4 MG/L
ERYTHROCYTE [DISTWIDTH] IN BLOOD BY AUTOMATED COUNT: 12.5 % (ref 11–15)
ERYTHROCYTE [DISTWIDTH] IN BLOOD BY AUTOMATED COUNT: 12.6 % (ref 11–15)
FERRITIN SERPL-MCNC: 4 NG/ML (ref 16–154)
HCT VFR BLD AUTO: 30.5 % (ref 35–45)
HCT VFR BLD AUTO: 31 % (ref 35–45)
HGB BLD-MCNC: 9.6 G/DL (ref 11.7–15.5)
HGB BLD-MCNC: 9.7 G/DL (ref 11.7–15.5)
IRON SATN MFR SERPL: 7 % (CALC) (ref 16–45)
IRON SERPL-MCNC: 23 MCG/DL (ref 40–190)
MCH RBC QN AUTO: 26.4 PG (ref 27–33)
MCH RBC QN AUTO: 26.9 PG (ref 27–33)
MCHC RBC AUTO-ENTMCNC: 31 G/DL (ref 32–36)
MCHC RBC AUTO-ENTMCNC: 31.8 G/DL (ref 32–36)
MCV RBC AUTO: 84.5 FL (ref 80–100)
MCV RBC AUTO: 85.2 FL (ref 80–100)
PLATELET # BLD AUTO: 425 THOUSAND/UL (ref 140–400)
PLATELET # BLD AUTO: 447 THOUSAND/UL (ref 140–400)
PMV BLD REES-ECKER: 11.2 FL (ref 7.5–12.5)
PMV BLD REES-ECKER: 11.2 FL (ref 7.5–12.5)
RBC # BLD AUTO: 3.61 MILLION/UL (ref 3.8–5.1)
RBC # BLD AUTO: 3.64 MILLION/UL (ref 3.8–5.1)
TIBC SERPL-MCNC: 342 MCG/DL (CALC) (ref 250–450)
WBC # BLD AUTO: 6.2 THOUSAND/UL (ref 3.8–10.8)
WBC # BLD AUTO: 6.5 THOUSAND/UL (ref 3.8–10.8)

## 2025-04-10 RX ORDER — ONDANSETRON HYDROCHLORIDE 2 MG/ML
4 INJECTION, SOLUTION INTRAVENOUS ONCE AS NEEDED
Status: CANCELLED | OUTPATIENT
Start: 2025-04-10

## 2025-04-11 ENCOUNTER — DOCUMENTATION (OUTPATIENT)
Dept: GASTROENTEROLOGY | Facility: HOSPITAL | Age: 25
End: 2025-04-11

## 2025-04-11 ENCOUNTER — HOSPITAL ENCOUNTER (OUTPATIENT)
Dept: GASTROENTEROLOGY | Facility: HOSPITAL | Age: 25
Discharge: HOME | End: 2025-04-11
Payer: COMMERCIAL

## 2025-04-11 VITALS
SYSTOLIC BLOOD PRESSURE: 108 MMHG | HEART RATE: 77 BPM | WEIGHT: 160.94 LBS | RESPIRATION RATE: 16 BRPM | TEMPERATURE: 98.6 F | HEIGHT: 64 IN | OXYGEN SATURATION: 100 % | DIASTOLIC BLOOD PRESSURE: 60 MMHG | BODY MASS INDEX: 27.48 KG/M2

## 2025-04-11 DIAGNOSIS — K51.919 ULCERATIVE COLITIS WITH COMPLICATION, UNSPECIFIED LOCATION (MULTI): ICD-10-CM

## 2025-04-11 DIAGNOSIS — K51.219 ULCERATIVE PROCTITIS WITH COMPLICATION (MULTI): ICD-10-CM

## 2025-04-11 LAB — PREGNANCY TEST URINE, POC: NEGATIVE

## 2025-04-11 PROCEDURE — 7100000010 HC PHASE TWO TIME - EACH INCREMENTAL 1 MINUTE

## 2025-04-11 PROCEDURE — 2500000005 HC RX 250 GENERAL PHARMACY W/O HCPCS: Performed by: INTERNAL MEDICINE

## 2025-04-11 PROCEDURE — 2500000004 HC RX 250 GENERAL PHARMACY W/ HCPCS (ALT 636 FOR OP/ED): Performed by: INTERNAL MEDICINE

## 2025-04-11 PROCEDURE — 3700000013 HC SEDATION LEVEL 5+ TIME - EACH ADDITIONAL 15 MINUTES

## 2025-04-11 PROCEDURE — 3700000012 HC SEDATION LEVEL 5+ TIME - INITIAL 15 MINUTES 5/> YEARS

## 2025-04-11 PROCEDURE — 81025 URINE PREGNANCY TEST: CPT | Performed by: INTERNAL MEDICINE

## 2025-04-11 PROCEDURE — 7100000009 HC PHASE TWO TIME - INITIAL BASE CHARGE

## 2025-04-11 PROCEDURE — 45380 COLONOSCOPY AND BIOPSY: CPT | Performed by: INTERNAL MEDICINE

## 2025-04-11 RX ORDER — MIDAZOLAM HYDROCHLORIDE 1 MG/ML
INJECTION, SOLUTION INTRAMUSCULAR; INTRAVENOUS AS NEEDED
Status: COMPLETED | OUTPATIENT
Start: 2025-04-11 | End: 2025-04-11

## 2025-04-11 RX ORDER — FENTANYL CITRATE 50 UG/ML
INJECTION, SOLUTION INTRAMUSCULAR; INTRAVENOUS AS NEEDED
Status: COMPLETED | OUTPATIENT
Start: 2025-04-11 | End: 2025-04-11

## 2025-04-11 RX ORDER — DIPHENHYDRAMINE HYDROCHLORIDE 50 MG/ML
INJECTION, SOLUTION INTRAMUSCULAR; INTRAVENOUS AS NEEDED
Status: COMPLETED | OUTPATIENT
Start: 2025-04-11 | End: 2025-04-11

## 2025-04-11 RX ADMIN — MIDAZOLAM HYDROCHLORIDE 1 MG: 1 INJECTION, SOLUTION INTRAMUSCULAR; INTRAVENOUS at 12:51

## 2025-04-11 RX ADMIN — FENTANYL CITRATE 50 MCG: 50 INJECTION, SOLUTION INTRAMUSCULAR; INTRAVENOUS at 12:46

## 2025-04-11 RX ADMIN — MIDAZOLAM HYDROCHLORIDE 2 MG: 1 INJECTION, SOLUTION INTRAMUSCULAR; INTRAVENOUS at 12:49

## 2025-04-11 RX ADMIN — Medication 2 L/MIN: at 12:43

## 2025-04-11 RX ADMIN — MIDAZOLAM HYDROCHLORIDE 2 MG: 1 INJECTION, SOLUTION INTRAMUSCULAR; INTRAVENOUS at 13:05

## 2025-04-11 RX ADMIN — MIDAZOLAM HYDROCHLORIDE 2 MG: 1 INJECTION, SOLUTION INTRAMUSCULAR; INTRAVENOUS at 12:46

## 2025-04-11 RX ADMIN — DIPHENHYDRAMINE HYDROCHLORIDE 50 MG: 50 INJECTION INTRAMUSCULAR; INTRAVENOUS at 12:47

## 2025-04-11 RX ADMIN — FENTANYL CITRATE 50 MCG: 50 INJECTION, SOLUTION INTRAMUSCULAR; INTRAVENOUS at 12:49

## 2025-04-11 ASSESSMENT — PAIN SCALES - GENERAL
PAINLEVEL_OUTOF10: 0 - NO PAIN
PAINLEVEL_OUTOF10: 2
PAINLEVEL_OUTOF10: 0 - NO PAIN

## 2025-04-11 ASSESSMENT — PAIN - FUNCTIONAL ASSESSMENT
PAIN_FUNCTIONAL_ASSESSMENT: 0-10

## 2025-04-11 NOTE — H&P
History Of Present Illness  Pastora Rawls is a 24 y.o. female presenting with ulcerative colitis.  Currently on risankizumab.  For disease evaluation.     Past Medical History  Past Medical History:   Diagnosis Date    Anemia     Asthma 01/01/2000    BRCA2 gene mutation positive 8/2024    GI (gastrointestinal bleed)     History of lumbar fusion     s/p h/o scoliosis    Personal history of other diseases of the respiratory system     Personal history of asthma    Ulcerative colitis     Ulcerative colitis      Surgical History  Past Surgical History:   Procedure Laterality Date    BACK SURGERY  09/17/2014    Back Surgery    TONSILLECTOMY  06/11/2014    Tonsillectomy With Adenoidectomy     Social History  She reports that she has never smoked. She has never used smokeless tobacco. She reports that she does not currently use alcohol. She reports that she does not use drugs.    Family History  Family History   Problem Relation Name Age of Onset    Breast cancer Mother Yesi Pope     Breast cancer Mother's Sister      Breast cancer Maternal Grandmother Malinda Wells         Allergies  Allergies   Allergen Reactions    Aspirin Other    Gelatin Itching     Nausea/vomitting    Nsaids (Non-Steroidal Anti-Inflammatory Drug) Itching     Crohn's     Review of Systems  Pre-sedation Evaluation:  ASA Classification - ASA 2 - Patient with mild systemic disease with no functional limitations  Mallampati Score - III (soft and hard palate and base of uvula visible)    Physical Exam   Vital signs reviewed  Patient alert and oriented in no acute distress  Cardiac exam regular rate and rhythm S1-S2 without murmurs gallops or rubs  Lungs clear to auscultation bilaterally  Abdomen soft and nontender     Last Recorded Vitals  There were no vitals taken for this visit.     Assessment/Plan   Colonoscopy to evaluate ulcerative colitis     PTA/Current Medications:  (Not in a hospital admission)    Current Outpatient Medications    Medication Sig Dispense Refill    budesonide ER (Uceris) 9 mg tablet Take 1 tablet (9 mg) by mouth once daily. 30 tablet 1    ferrous sulfate, 325 mg ferrous sulfate, tablet Take 1 tablet (325 mg) by mouth once daily with breakfast. (Patient not taking: Reported on 11/20/2024)      ferumoxytol (Feraheme) 510 mg/17 mL (30 mg/mL) injection Infuse 17 mL (510 mg) into a venous catheter see administration instructions for 2 doses. (Patient not taking: Reported on 2/27/2025) 17 mL 1    hydrocortisone 2.5 % cream Apply topically 2 times a day. As needed 28 g 3    mirikizumab-mrkz (Omvoh) 300 mg/15 mL (20 mg/mL) solution injection Infuse 15 mL (300 mg) over 30 minutes into a venous catheter every 28 (twenty-eight) days. 15 mL 2    Omvoh Pen 100 mg/mL pen injector injection Inject 2 mL (200 mg) under the skin every 28 (twenty-eight) days. Start 4 weeks after the third infusion 2 mL 6     No current facility-administered medications for this encounter.     Lazaro Shah MD

## 2025-04-11 NOTE — PROGRESS NOTES
She has completed 2 induction infusions of risankizumab.  Third and final induction infusion is due on Monday, 4/14/2025.  She does think she feels better over the past 2 months, although she has also been on Uceris 9 mg daily during this time.  She completes Uceris in 5 days (4/15/2025).    4/11/25 colonoscopy shows persistent Mckeon 3 ulcerative colitis in the rectum and sigmoid colon with Mckeon 2 disease to the mid transverse colon.  Other than that, the colonoscopy look normal.  Compared to colonoscopy 1 year ago, the distal disease remains severe, especially in the rectosigmoid, but the disease in the descending colon and transverse colon has improved and the disease that was previously planned colonic in the ascending colon and cecum as also improved.  Thus, it looks like there has been some endoscopic improvement, though there remains persistent distal disease.    We will await biopsies and have her complete the induction infusion of risankizumab.  Labs show that she is iron deficient and we will have her get IV iron.  We will ask her to follow-up in the office in about 6 weeks.  If she is not making progress at that time, we will discuss alternative therapies, possibly combination therapy with azathioprine.

## 2025-04-14 ENCOUNTER — DOCUMENTATION (OUTPATIENT)
Dept: INFUSION THERAPY | Facility: CLINIC | Age: 25
End: 2025-04-14

## 2025-04-14 ENCOUNTER — APPOINTMENT (OUTPATIENT)
Dept: INFUSION THERAPY | Facility: CLINIC | Age: 25
End: 2025-04-14
Payer: COMMERCIAL

## 2025-04-14 VITALS
HEART RATE: 82 BPM | BODY MASS INDEX: 28.1 KG/M2 | OXYGEN SATURATION: 98 % | DIASTOLIC BLOOD PRESSURE: 67 MMHG | WEIGHT: 163.69 LBS | TEMPERATURE: 97.2 F | RESPIRATION RATE: 16 BRPM | SYSTOLIC BLOOD PRESSURE: 108 MMHG

## 2025-04-14 DIAGNOSIS — K51.911 ULCERATIVE COLITIS WITH RECTAL BLEEDING, UNSPECIFIED LOCATION (MULTI): ICD-10-CM

## 2025-04-14 DIAGNOSIS — D50.0 IRON DEFICIENCY ANEMIA DUE TO CHRONIC BLOOD LOSS: Primary | ICD-10-CM

## 2025-04-14 DIAGNOSIS — K51.219 ULCERATIVE PROCTITIS WITH COMPLICATION (MULTI): ICD-10-CM

## 2025-04-14 PROCEDURE — 96365 THER/PROPH/DIAG IV INF INIT: CPT | Performed by: NURSE PRACTITIONER

## 2025-04-14 RX ORDER — EPINEPHRINE 0.3 MG/.3ML
0.3 INJECTION SUBCUTANEOUS EVERY 5 MIN PRN
Status: CANCELLED | OUTPATIENT
Start: 2025-05-09

## 2025-04-14 RX ORDER — ALBUTEROL SULFATE 0.83 MG/ML
3 SOLUTION RESPIRATORY (INHALATION) AS NEEDED
Status: CANCELLED | OUTPATIENT
Start: 2025-05-09

## 2025-04-14 RX ORDER — FAMOTIDINE 10 MG/ML
20 INJECTION, SOLUTION INTRAVENOUS ONCE AS NEEDED
OUTPATIENT
Start: 2025-04-14

## 2025-04-14 RX ORDER — DIPHENHYDRAMINE HYDROCHLORIDE 50 MG/ML
50 INJECTION, SOLUTION INTRAMUSCULAR; INTRAVENOUS AS NEEDED
Status: CANCELLED | OUTPATIENT
Start: 2025-05-09

## 2025-04-14 RX ORDER — EPINEPHRINE 0.3 MG/.3ML
0.3 INJECTION SUBCUTANEOUS EVERY 5 MIN PRN
OUTPATIENT
Start: 2025-04-14

## 2025-04-14 RX ORDER — DIPHENHYDRAMINE HYDROCHLORIDE 50 MG/ML
50 INJECTION, SOLUTION INTRAMUSCULAR; INTRAVENOUS AS NEEDED
OUTPATIENT
Start: 2025-04-14

## 2025-04-14 RX ORDER — ALBUTEROL SULFATE 0.83 MG/ML
3 SOLUTION RESPIRATORY (INHALATION) AS NEEDED
OUTPATIENT
Start: 2025-04-14

## 2025-04-14 RX ORDER — FAMOTIDINE 10 MG/ML
20 INJECTION, SOLUTION INTRAVENOUS ONCE AS NEEDED
Status: CANCELLED | OUTPATIENT
Start: 2025-05-09

## 2025-04-14 RX ORDER — FERUMOXYTOL 510 MG/17ML
510 INJECTION INTRAVENOUS SEE ADMIN INSTRUCTIONS
Qty: 17 ML | Refills: 1 | Status: SHIPPED
Start: 2025-04-14

## 2025-04-14 ASSESSMENT — ENCOUNTER SYMPTOMS
ABDOMINAL PAIN: 0
EYE PROBLEMS: 0
UNEXPECTED WEIGHT CHANGE: 0
DEPRESSION: 0
MYALGIAS: 0
NERVOUS/ANXIOUS: 0
PALPITATIONS: 0
CONSTIPATION: 0
EXTREMITY WEAKNESS: 0
APPETITE CHANGE: 0
NUMBNESS: 0
VOMITING: 0
SHORTNESS OF BREATH: 0
WHEEZING: 0
LEG SWELLING: 0
CHILLS: 0
FREQUENCY: 0
FEVER: 0
LIGHT-HEADEDNESS: 0
DIARRHEA: 0
BLOOD IN STOOL: 1
WOUND: 0
SORE THROAT: 0
ARTHRALGIAS: 0
TROUBLE SWALLOWING: 0
HEMATURIA: 0
DYSURIA: 0
DIZZINESS: 0
VOICE CHANGE: 0
HEADACHES: 0
FATIGUE: 0
COUGH: 0
NAUSEA: 1
BRUISES/BLEEDS EASILY: 0

## 2025-04-14 NOTE — PATIENT INSTRUCTIONS
Today :We administered mirikizumab-mrkz.     For:   1. Ulcerative proctitis with complication (Multi)         Your next appointment is due in:  NOT APPLICABLE - INJECTIONS AT HOME        Please read the  Medication Guide that was given to you and reviewed during todays visit.     (Tell all doctors including dentists that you are taking this medication)     Go to the emergency room or call 911 if:  -You have signs of allergic reaction:   -Rash, hives, itching.   -Swollen, blistered, peeling skin.   -Swelling of face, lips, mouth, tongue or throat.   -Tightness of chest, trouble breathing, swallowing or talking     Call your doctor:  - If IV / injection site gets red, warm, swollen, itchy or leaks fluid or pus.     (Leave dressing on your IV site for at least 2 hours and keep area clean and dry  - If you get sick or have symptoms of infection or are not feeling well for any reason.    (Wash your hands often, stay away from people who are sick)  - If you have side effects from your medication that do not go away or are bothersome.     (Refer to the teaching your nurse gave you for side effects to call your doctor about)    - Common side effects may include:  stuffy nose, headache, feeling tired, muscle aches, upset stomach  - Before receiving any vaccines     - Call the [facility] at   If:  - You get sick, are on antibiotics, have had a recent vaccine, have surgery or dental work and your doctor wants your visit rescheduled.  - You need to cancel and reschedule your visit for any reason. Call at least 2 days before your visit if you need to cancel.   - Your insurance changes before your next visit.    (We will need to get approval from your new insurance. This can take up to two weeks.)     The [facility] is opened Monday thru Friday. We are closed on weekends and holidays.   Voice mail will take your call if the center is closed. If you leave a message please allow 24 hours for a call  back during weekdays. If you leave a message on a weekend/holiday, we will call you back the next business day.    A pharmacist is available Monday - Friday from 8:30AM to 3:30PM to help answer any questions you may have about your prescriptions(s). Please call pharmacy at:    [facility]: [phone]  [facility]: [phone]  [facility]: [phone]

## 2025-04-14 NOTE — PROGRESS NOTES
[facility]   Infusion Clinic Note   Date: 2025   Name: Pastora Rawls  : 2000   MRN: [id number]         Reason for Visit: OP Infusion (PT HERE FOR 'WEEK 8' OF OMVOH 300 MG INFUSION)         Today: We administered mirikizumab-mrkz.       Ordered By: Lazaro Shah MD       For a Diagnosis of: Ulcerative proctitis with complication (Multi)       At today's visit patient accompanied by: Self      Today's Vitals:   Vitals:    25 1410 25 1514   BP: 112/76 108/67   Pulse: 87 82   Resp: 16 16   Temp: 36.5 °C (97.7 °F) 36.2 °C (97.2 °F)   SpO2: 97% 98%   Weight: 74.2 kg (163 lb 11.1 oz)    LMP: 2025             Pre - Treatment Checklist:      - Previous reaction to current treatment: no      (Assess patient for the concerns below. Document provider notification as appropriate).  - Active or recent infection with/without current antibiotic use: no  - Recent or planned invasive dental work: no  - Recent or planned surgeries: yes - 2025 THIS PAST  PT HAD COLONOSCOPY WITH BIOPSIES  - Recently received or plans to receive vaccinations: no  - Has treatment related toxicities: no  - Any chance may be pregnant:  no      Pain: 0   - Is the pain different from normal: n/a   - Is prescribing Doctor aware:  n/a      Labs: N/A      Fall Risk Screening: Lowe Fall Risk  History of Falling, Immediate or Within 3 Months: No  Secondary Diagnosis: No  Ambulatory Aid: Walks without aid/bedrest/nurse assist  Intravenous Therapy/Heparin Lock: Yes  Gait/Transferring: Normal/bedrest/immobile  Mental Status: Oriented to own ability  Lowe Fall Risk Score: 20       Review Of Systems:  Review of Systems   Constitutional:  Negative for appetite change, chills, fatigue, fever and unexpected weight change.   HENT:   Negative for hearing loss, mouth sores, sore throat, tinnitus, trouble swallowing and voice change.    Eyes:  Negative for eye problems.   Respiratory:   Negative for cough, shortness of breath and wheezing.    Cardiovascular:  Negative for chest pain, leg swelling and palpitations.   Gastrointestinal:  Positive for blood in stool and nausea. Negative for abdominal pain, constipation, diarrhea and vomiting.        ADMITS TO APPROX. 3 STOOLS PER DAY, LOOSE IN CONSISTENCY  ADMITS TO INTERMITTENT MUCOUS IN STOOLS AND BLOOD IN STOOLS FOR 3 WEEKS POST INFUSIONS- MD NOTIFIED AND AWARE AND MONITORING  DENIES CONSTIPATION AND DIARRHEA  ADMITS TO OCCASIONAL NOCTURNAL BOWEL MOVEMENTS  ADMITS TO INTERMITTENT NAUSEA BUT DENIES VOMITING AND ABDOMINAL PAIN   Genitourinary:  Negative for dysuria, frequency and hematuria.    Musculoskeletal:  Negative for arthralgias and myalgias.   Skin:  Positive for rash. Negative for itching and wound.        PT HAS A RASH ON R SIDE OF NECK SINCE 2 WEEKS AGO THAT IS CLOSE TO PT SKIN COLOR, NO BUMPS, MD AWARE AND USING HYDROCORTISONE CREAM ON IT WHICH HAS RELIEVED ITCHINESS   Neurological:  Negative for dizziness, extremity weakness, headaches, light-headedness and numbness.   Hematological:  Does not bruise/bleed easily.   Psychiatric/Behavioral:  Negative for depression. The patient is not nervous/anxious.          Infusion Readiness:  - Assessment Concerns Related to Infusion: No  - Provider notified: n/a      New Patient Education:    N/A (returning patient for continuation of therapy. Ongoing education provided as needed.) *WEEK 8 EDUCATION COMPLETE; PROVIDED PT WITH DEMO KIT FOR INJECTIONS AND EXPLAINED HOW TO USE, INJECTION SCHEDULE AT HOME        Treatment Conditions & Drug Specific Questions:    Mirikizumab (OMVOH)    (Unless otherwise specified on patient specific therapy plan):     TREATMENT CONDITIONS:  Unless otherwise specified on patient specific therapy plan HOLD and notify provider prior to proceeding with treatment if:   o ALT GREATER than 3x ULN and/or AST GREATER than 3x ULN  o Positive T-spot      Chemistry    Lab Results  "  Component Value Date/Time     01/15/2025 1441    K 3.3 (L) 01/15/2025 1441     01/15/2025 1441    CO2 25 01/15/2025 1441    BUN 20 01/15/2025 1441    CREATININE 0.47 (L) 01/15/2025 1441    Lab Results   Component Value Date/Time    CALCIUM 8.9 01/15/2025 1441    ALKPHOS 116 (H) 01/15/2025 1441    AST 7 (L) 01/15/2025 1441    ALT 7 01/15/2025 1441    BILITOT 0.2 01/15/2025 1441        ,  Lab Results   Component Value Date    ALT 7 01/15/2025    AST 7 (L) 01/15/2025    ALKPHOS 116 (H) 01/15/2025    BILITOT 0.2 01/15/2025      Lab Results   Component Value Date    TBSIN Negative 01/15/2025    No results found for: \"NONUHFIRE\", \"NONUHSWGH\", \"NONUHFISH\", \"EXTHEPBSAG\"    Patient meets treatment conditions? Yes    Recommended Vitals/Observation:  Vitals: Monitor patient's vital signs prior to infusion start, at end of infusion and as needed.  Observation: No observation.          Weight Based Drug Calculations:    WEIGHT BASED DRUGS: NOT APPLICABLE / FLAT DOSE       Post Treatment: Patient tolerated treatment without issue and was discharged in no apparent distress.      Note Authored / Patient Cared for By: Stefanie Vizcaino RN        "

## 2025-04-14 NOTE — PROGRESS NOTES
CLINICAL CLEARANCE FOR OUTPATIENT INFUSION      Patient to be scheduled for Feraheme (Ferumoxytol) infusions.     For Diagnosis: Iron Deficiency Anemia    Urine Hcg test ordered prior to first infusion?  Yes     Review of Labs:  Lab Results   Component Value Date    HGB 9.7 (L) 04/07/2025    FERRITIN 4 (L) 04/07/2025    IRON 23 (L) 04/07/2025    TIBC 342 04/07/2025    MCHC 31.8 (L) 04/07/2025    MCV 84.5 04/07/2025    MCH 26.9 (L) 04/07/2025      Lab Results   Component Value Date    IRONSAT 7 (L) 04/07/2025        Do labs confirm diagnosis of iron deficiency? Yes          Okay to schedule for infusion as ordered by prescribing provider.      IDALIA Sykes-CNP    [facility])  [address], [place] [address]  Phone: [phone]

## 2025-04-15 ENCOUNTER — SPECIALTY PHARMACY (OUTPATIENT)
Dept: PHARMACY | Facility: CLINIC | Age: 25
End: 2025-04-15

## 2025-04-19 DIAGNOSIS — K51.919 ULCERATIVE COLITIS WITH COMPLICATION, UNSPECIFIED LOCATION (MULTI): ICD-10-CM

## 2025-04-19 RX ORDER — MIRIKIZUMAB-MRKZ 100 MG/ML
200 INJECTION, SOLUTION SUBCUTANEOUS
Qty: 2 ML | Refills: 5 | Status: SHIPPED | OUTPATIENT
Start: 2025-04-19

## 2025-04-19 NOTE — PROGRESS NOTES
The following prescription(s) has/have been re-routed to [facility] per insurance mandate:  Requested Prescriptions     Signed Prescriptions Disp Refills    Omvoh Pen 100 mg/mL pen injector injection 2 mL 5     Sig: Inject 2 mL (200 mg) under the skin every 28 (twenty-eight) days. Start 4 weeks after the third infusion      Prescriptions ordered pursuant to pharmacist-provider collaborative practice agreement.

## 2025-04-24 ENCOUNTER — INFUSION (OUTPATIENT)
Dept: INFUSION THERAPY | Facility: CLINIC | Age: 25
End: 2025-04-24
Payer: COMMERCIAL

## 2025-04-24 VITALS
HEART RATE: 93 BPM | WEIGHT: 161.06 LBS | DIASTOLIC BLOOD PRESSURE: 63 MMHG | RESPIRATION RATE: 16 BRPM | BODY MASS INDEX: 27.65 KG/M2 | SYSTOLIC BLOOD PRESSURE: 107 MMHG | TEMPERATURE: 97.9 F | OXYGEN SATURATION: 97 %

## 2025-04-24 DIAGNOSIS — D50.0 IRON DEFICIENCY ANEMIA DUE TO CHRONIC BLOOD LOSS: ICD-10-CM

## 2025-04-24 DIAGNOSIS — K51.911 ULCERATIVE COLITIS WITH RECTAL BLEEDING, UNSPECIFIED LOCATION (MULTI): ICD-10-CM

## 2025-04-24 PROCEDURE — 96365 THER/PROPH/DIAG IV INF INIT: CPT | Performed by: NURSE PRACTITIONER

## 2025-04-24 RX ORDER — ALBUTEROL SULFATE 0.83 MG/ML
3 SOLUTION RESPIRATORY (INHALATION) AS NEEDED
OUTPATIENT
Start: 2025-05-01

## 2025-04-24 RX ORDER — FAMOTIDINE 10 MG/ML
20 INJECTION, SOLUTION INTRAVENOUS ONCE AS NEEDED
OUTPATIENT
Start: 2025-05-01

## 2025-04-24 RX ORDER — EPINEPHRINE 0.3 MG/.3ML
0.3 INJECTION SUBCUTANEOUS EVERY 5 MIN PRN
OUTPATIENT
Start: 2025-05-01

## 2025-04-24 RX ORDER — DIPHENHYDRAMINE HYDROCHLORIDE 50 MG/ML
50 INJECTION, SOLUTION INTRAMUSCULAR; INTRAVENOUS AS NEEDED
OUTPATIENT
Start: 2025-05-01

## 2025-04-24 ASSESSMENT — ENCOUNTER SYMPTOMS
BRUISES/BLEEDS EASILY: 0
WOUND: 0
SHORTNESS OF BREATH: 0
ARTHRALGIAS: 0
WHEEZING: 0
EYE PROBLEMS: 0
FEVER: 0
BLOOD IN STOOL: 0
HEMATURIA: 0
UNEXPECTED WEIGHT CHANGE: 0
SORE THROAT: 0
PALPITATIONS: 0
VOICE CHANGE: 0
NAUSEA: 0
COUGH: 0
FREQUENCY: 0
EXTREMITY WEAKNESS: 0
CHILLS: 0
NERVOUS/ANXIOUS: 0
DYSURIA: 0
HEADACHES: 0
CONSTIPATION: 0
LIGHT-HEADEDNESS: 0
VOMITING: 0
DIARRHEA: 0
DIZZINESS: 0
ABDOMINAL PAIN: 0
APPETITE CHANGE: 0
MYALGIAS: 0
NUMBNESS: 0
DEPRESSION: 0
TROUBLE SWALLOWING: 0
FATIGUE: 0
LEG SWELLING: 0

## 2025-04-24 NOTE — PROGRESS NOTES
[facility]   Infusion Clinic Note   Date: 2025   Name: Pastora Rawls  : 2000   MRN: [id number]         Reason for Visit: OP Infusion (PT HERE FOR DOSE 1 OF 2 OF FERAHEME 510 MG INFUSION - NEXT DUE IN 1 WEEK)         Today: We administered ferumoxytol (Feraheme) 510 mg in sodium chloride 0.9% 117 mL IV.       Ordered By: Lazaro Shah MD       For a Diagnosis of: Iron deficiency anemia due to chronic blood loss    Ulcerative colitis with rectal bleeding, unspecified location (Multi)       At today's visit patient accompanied by: Relative      Today's Vitals:   Vitals:    25 1510 25 1545 25 1615   BP: 117/76 93/63 107/63   Pulse: 103 94 93   Resp: 18 16 16   Temp: 36.1 °C (97 °F) 36.9 °C (98.4 °F) 36.6 °C (97.9 °F)   SpO2: 99% 97% 97%   Weight: 73.1 kg (161 lb 0.9 oz)     LMP: 2025             Pre - Treatment Checklist:      - Previous reaction to current treatment: no - PT HAS HAD IRON INFUSIONS IN THE PAST WITH NO ISSUES, UNSURE IF FERAHEME IS WHAT SHE RECEIVED      (Assess patient for the concerns below. Document provider notification as appropriate).  - Active or recent infection with/without current antibiotic use: no  - Recent or planned invasive dental work: no  - Recent or planned surgeries: no  - Recently received or plans to receive vaccinations: no  - Has treatment related toxicities: no  - Any chance may be pregnant:  no *PT REFUSED POCT URINE PREGNANCY BUT SIGNED REFUSAL OF PRE-PROCEDURE PREGNANCY SCREENING      Pain: 0   - Is the pain different from normal: n/a   - Is prescribing Doctor aware:  n/a      Labs: N/A      Fall Risk Screening: Lowe Fall Risk  History of Falling, Immediate or Within 3 Months: No  Secondary Diagnosis: No  Ambulatory Aid: Walks without aid/bedrest/nurse assist  Intravenous Therapy/Heparin Lock: Yes  Gait/Transferring: Normal/bedrest/immobile  Mental Status: Oriented to own ability  Lowe Fall Risk  Score: 20       Review Of Systems:  Review of Systems   Constitutional:  Negative for appetite change, chills, fatigue, fever and unexpected weight change.   HENT:   Negative for hearing loss, mouth sores, sore throat, tinnitus, trouble swallowing and voice change.    Eyes:  Negative for eye problems.   Respiratory:  Negative for cough, shortness of breath and wheezing.    Cardiovascular:  Negative for chest pain, leg swelling and palpitations.   Gastrointestinal:  Negative for abdominal pain, blood in stool, constipation, diarrhea, nausea and vomiting.   Genitourinary:  Negative for dysuria, frequency and hematuria.    Musculoskeletal:  Negative for arthralgias and myalgias.   Skin:  Negative for itching, rash and wound.   Neurological:  Negative for dizziness, extremity weakness, headaches, light-headedness and numbness.   Hematological:  Does not bruise/bleed easily.   Psychiatric/Behavioral:  Negative for depression. The patient is not nervous/anxious.          Infusion Readiness:  - Assessment Concerns Related to Infusion: No  - Provider notified: n/a      New Patient Education:    N/A (returning patient for continuation of therapy. Ongoing education provided as needed.)        Treatment Conditions & Drug Specific Questions:    Ferumoxytol  (FERAHEME)    (Unless otherwise specified on patient specific therapy plan):    REMINDERS:  May cause hypotension, patient should be in a reclined or semi-reclined position during infusion.    Ferumoxytol can interfere with MR imaging and Radiology should be notified if a patient has received ferumoxytol within 3 months of the anticipated MR.    Recommended Vitals/Observation:  Vitals: Obtain vital signs before, immediately following infusion, and 30 minutes after completion of infusion.  Observation: 30 minutes after each infusion. Observation complete.      Lab Results   Component Value Date    IRON 23 (L) 04/07/2025    TIBC 342 04/07/2025    FERRITIN 4 (L) 04/07/2025       Lab Results   Component Value Date    IRONSAT 7 (L) 04/07/2025      Lab Results   Component Value Date    WBC 6.5 04/07/2025    HGB 9.7 (L) 04/07/2025    HCT 30.5 (L) 04/07/2025    MCV 84.5 04/07/2025     (H) 04/07/2025            Weight Based Drug Calculations:    WEIGHT BASED DRUGS: NOT APPLICABLE / FLAT DOSE       Post Treatment: Patient tolerated treatment without issue and was discharged in no apparent distress.      Note Authored / Patient Cared for By: Stefanie Vizcaino RN

## 2025-04-24 NOTE — PATIENT INSTRUCTIONS
Today :We administered ferumoxytol (Feraheme) 510 mg in sodium chloride 0.9% 117 mL IV.     For:   1. Iron deficiency anemia due to chronic blood loss    2. Ulcerative colitis with rectal bleeding, unspecified location (Multi)         Your next appointment is due in:  1 WEEK        Please read the  Medication Guide that was given to you and reviewed during todays visit.     (Tell all doctors including dentists that you are taking this medication)     Go to the emergency room or call 911 if:  -You have signs of allergic reaction:   -Rash, hives, itching.   -Swollen, blistered, peeling skin.   -Swelling of face, lips, mouth, tongue or throat.   -Tightness of chest, trouble breathing, swallowing or talking     Call your doctor:  - If IV / injection site gets red, warm, swollen, itchy or leaks fluid or pus.     (Leave dressing on your IV site for at least 2 hours and keep area clean and dry  - If you get sick or have symptoms of infection or are not feeling well for any reason.    (Wash your hands often, stay away from people who are sick)  - If you have side effects from your medication that do not go away or are bothersome.     (Refer to the teaching your nurse gave you for side effects to call your doctor about)    - Common side effects may include:  stuffy nose, headache, feeling tired, muscle aches, upset stomach  - Before receiving any vaccines     - Call [facility] at   If:  - You get sick, are on antibiotics, have had a recent vaccine, have surgery or dental work and your doctor wants your visit rescheduled.  - You need to cancel and reschedule your visit for any reason. Call at least 2 days before your visit if you need to cancel.   - Your insurance changes before your next visit.    (We will need to get approval from your new insurance. This can take up to two weeks.)     [facility] is opened Monday thru Friday. We are closed on weekends and holidays.   Voice mail will  take your call if the center is closed. If you leave a message please allow 24 hours for a call back during weekdays. If you leave a message on a weekend/holiday, we will call you back the next business day.    A pharmacist is available Monday - Friday from 8:30AM to 3:30PM to help answer any questions you may have about your prescriptions(s). Please call pharmacy at:    [facility]: [phone]  [facility]: [phone]  [facility]: [phone]

## 2025-05-01 ENCOUNTER — APPOINTMENT (OUTPATIENT)
Dept: INFUSION THERAPY | Facility: CLINIC | Age: 25
End: 2025-05-01
Payer: COMMERCIAL

## 2025-05-01 VITALS
TEMPERATURE: 97.5 F | SYSTOLIC BLOOD PRESSURE: 95 MMHG | DIASTOLIC BLOOD PRESSURE: 64 MMHG | RESPIRATION RATE: 16 BRPM | OXYGEN SATURATION: 99 % | HEART RATE: 94 BPM | BODY MASS INDEX: 27.61 KG/M2 | WEIGHT: 160.83 LBS

## 2025-05-01 DIAGNOSIS — K51.911 ULCERATIVE COLITIS WITH RECTAL BLEEDING, UNSPECIFIED LOCATION (MULTI): ICD-10-CM

## 2025-05-01 DIAGNOSIS — D50.0 IRON DEFICIENCY ANEMIA DUE TO CHRONIC BLOOD LOSS: ICD-10-CM

## 2025-05-01 PROCEDURE — 96365 THER/PROPH/DIAG IV INF INIT: CPT | Performed by: NURSE PRACTITIONER

## 2025-05-01 RX ORDER — FAMOTIDINE 10 MG/ML
20 INJECTION, SOLUTION INTRAVENOUS ONCE AS NEEDED
Status: CANCELLED | OUTPATIENT
Start: 2025-05-01

## 2025-05-01 RX ORDER — EPINEPHRINE 0.3 MG/.3ML
0.3 INJECTION SUBCUTANEOUS EVERY 5 MIN PRN
Status: CANCELLED | OUTPATIENT
Start: 2025-05-01

## 2025-05-01 RX ORDER — ALBUTEROL SULFATE 0.83 MG/ML
3 SOLUTION RESPIRATORY (INHALATION) AS NEEDED
Status: CANCELLED | OUTPATIENT
Start: 2025-05-01

## 2025-05-01 RX ORDER — DIPHENHYDRAMINE HYDROCHLORIDE 50 MG/ML
50 INJECTION, SOLUTION INTRAMUSCULAR; INTRAVENOUS AS NEEDED
Status: CANCELLED | OUTPATIENT
Start: 2025-05-01

## 2025-05-01 ASSESSMENT — ENCOUNTER SYMPTOMS
NAUSEA: 0
SORE THROAT: 0
WOUND: 0
HEADACHES: 0
EYE PROBLEMS: 0
DIARRHEA: 0
DYSURIA: 0
NERVOUS/ANXIOUS: 0
FATIGUE: 0
BLOOD IN STOOL: 0
TROUBLE SWALLOWING: 0
FREQUENCY: 0
LEG SWELLING: 0
VOMITING: 0
ABDOMINAL PAIN: 0
FEVER: 0
WHEEZING: 0
CONSTIPATION: 0
VOICE CHANGE: 0
DIZZINESS: 0
APPETITE CHANGE: 0
LIGHT-HEADEDNESS: 0
CHILLS: 0
PALPITATIONS: 0
DEPRESSION: 0
NUMBNESS: 0
HEMATURIA: 0
ARTHRALGIAS: 0
EXTREMITY WEAKNESS: 0
BRUISES/BLEEDS EASILY: 0
UNEXPECTED WEIGHT CHANGE: 0
MYALGIAS: 0
COUGH: 0
SHORTNESS OF BREATH: 0

## 2025-05-01 NOTE — PROGRESS NOTES
[facility]   Infusion Clinic Note   Date: May 1, 2025   Name: Pastora Rawls  : 2000   MRN: [id number]         Reason for Visit: Follow-up and OP Infusion (PATIENT IS HERE FOR FERAHEME 510 MG INFUSION. DOSE 2 OF 2.)         Today: We administered ferumoxytol (Feraheme) 510 mg in sodium chloride 0.9% 117 mL IV.       Ordered By: Lazaro Shah MD       For a Diagnosis of: Iron deficiency anemia due to chronic blood loss    Ulcerative colitis with rectal bleeding, unspecified location (Multi)       At today's visit patient accompanied by: Relative GRANDMOTHER      Today's Vitals:   Vitals:    25 1400 25 1526 25 1600   BP: 103/69 96/61 95/64   Pulse: 94 87 94   Resp:  16 16   Temp: 36.3 °C (97.3 °F) 36.2 °C (97.2 °F) 36.4 °C (97.5 °F)   SpO2: 99% 99% 99%   Weight: 73 kg (160 lb 13.2 oz)     LMP: 2025             Pre - Treatment Checklist:      - Previous reaction to current treatment: no      (Assess patient for the concerns below. Document provider notification as appropriate).  - Active or recent infection with/without current antibiotic use: no  - Recent or planned invasive dental work: no  - Recent or planned surgeries: no  - Recently received or plans to receive vaccinations: no  - Has treatment related toxicities: no  - Any chance may be pregnant:  no      Pain: 0   - Is the pain different from normal: no   - Is prescribing Doctor aware:  n/a      Labs: N/A      Fall Risk Screening: Lowe Fall Risk  History of Falling, Immediate or Within 3 Months: No  Secondary Diagnosis: No  Ambulatory Aid: Walks without aid/bedrest/nurse assist  Intravenous Therapy/Heparin Lock: Yes  Gait/Transferring: Normal/bedrest/immobile  Mental Status: Oriented to own ability  Lowe Fall Risk Score: 20       Review Of Systems:  Review of Systems   Constitutional:  Negative for appetite change, chills, fatigue, fever and unexpected weight change.   HENT:   Negative for  hearing loss, mouth sores, sore throat, tinnitus, trouble swallowing and voice change.    Eyes:  Negative for eye problems.   Respiratory:  Negative for cough, shortness of breath and wheezing.    Cardiovascular:  Negative for chest pain, leg swelling and palpitations.   Gastrointestinal:  Negative for abdominal pain, blood in stool, constipation, diarrhea, nausea and vomiting.   Genitourinary:  Negative for dysuria, frequency and hematuria.    Musculoskeletal:  Negative for arthralgias and myalgias.   Skin:  Positive for rash. Negative for itching and wound.        RASH TO LEFT TORSO AND RIGHT AREA UNDER BREAST. TREATING WITH TOPICAL MEDS. BELIEVED TO BE R/T OMVOH INFUSION.   Neurological:  Negative for dizziness, extremity weakness, headaches, light-headedness and numbness.   Hematological:  Does not bruise/bleed easily.        BRUISES EASILY   Psychiatric/Behavioral:  Negative for depression. The patient is not nervous/anxious.          Infusion Readiness:  - Assessment Concerns Related to Infusion: No  - Provider notified: n/a      New Patient Education:    N/A (returning patient for continuation of therapy. Ongoing education provided as needed.)        Treatment Conditions & Drug Specific Questions:    Ferumoxytol  (FERAHEME)    (Unless otherwise specified on patient specific therapy plan):    REMINDERS:  May cause hypotension, patient should be in a reclined or semi-reclined position during infusion.    Ferumoxytol can interfere with MR imaging and Radiology should be notified if a patient has received ferumoxytol within 3 months of the anticipated MR.    Recommended Vitals/Observation:  Vitals: Obtain vital signs before, immediately following infusion, and 30 minutes after completion of infusion.  Observation: 30 minutes after each infusion. Observation complete.      Lab Results   Component Value Date    IRON 23 (L) 04/07/2025    TIBC 342 04/07/2025    FERRITIN 4 (L) 04/07/2025      Lab Results   Component  Value Date    IRONSAT 7 (L) 04/07/2025      Lab Results   Component Value Date    WBC 6.5 04/07/2025    HGB 9.7 (L) 04/07/2025    HCT 30.5 (L) 04/07/2025    MCV 84.5 04/07/2025     (H) 04/07/2025            Weight Based Drug Calculations:    WEIGHT BASED DRUGS: NOT APPLICABLE / FLAT DOSE       Post Treatment: Patient tolerated treatment without issue and was discharged in no apparent distress.      Note Authored / Patient Cared for By: Carley Lowery RN

## 2025-05-02 NOTE — PATIENT INSTRUCTIONS
Today :We administered ferumoxytol (Feraheme) 510 mg in sodium chloride 0.9% 117 mL IV.     For:   1. Iron deficiency anemia due to chronic blood loss    2. Ulcerative colitis with rectal bleeding, unspecified location (Multi)          Please read the  Medication Guide that was given to you and reviewed during todays visit.     (Tell all doctors including dentists that you are taking this medication)     Go to the emergency room or call 911 if:  -You have signs of allergic reaction:   -Rash, hives, itching.   -Swollen, blistered, peeling skin.   -Swelling of face, lips, mouth, tongue or throat.   -Tightness of chest, trouble breathing, swallowing or talking     Call your doctor:  - If IV / injection site gets red, warm, swollen, itchy or leaks fluid or pus.     (Leave dressing on your IV site for at least 2 hours and keep area clean and dry  - If you get sick or have symptoms of infection or are not feeling well for any reason.    (Wash your hands often, stay away from people who are sick)  - If you have side effects from your medication that do not go away or are bothersome.     (Refer to the teaching your nurse gave you for side effects to call your doctor about)    - Common side effects may include:  stuffy nose, headache, feeling tired, muscle aches, upset stomach  - Before receiving any vaccines     - Call [facility] at   If:  - You get sick, are on antibiotics, have had a recent vaccine, have surgery or dental work and your doctor wants your visit rescheduled.  - You need to cancel and reschedule your visit for any reason. Call at least 2 days before your visit if you need to cancel.   - Your insurance changes before your next visit.    (We will need to get approval from your new insurance. This can take up to two weeks.)     [facility] is opened Monday thru Friday. We are closed on weekends and holidays.   Voice mail will take your call if the center is closed. If you  leave a message please allow 24 hours for a call back during weekdays. If you leave a message on a weekend/holiday, we will call you back the next business day.    A pharmacist is available Monday - Friday from 8:30AM to 3:30PM to help answer any questions you may have about your prescriptions(s). Please call pharmacy at:    [facility]: [phone]  [facility]: [phone]  [facility]: [phone]